# Patient Record
Sex: FEMALE | Race: WHITE | NOT HISPANIC OR LATINO | Employment: FULL TIME | URBAN - METROPOLITAN AREA
[De-identification: names, ages, dates, MRNs, and addresses within clinical notes are randomized per-mention and may not be internally consistent; named-entity substitution may affect disease eponyms.]

---

## 2017-01-27 ENCOUNTER — LAB CONVERSION - ENCOUNTER (OUTPATIENT)
Dept: OTHER | Facility: OTHER | Age: 62
End: 2017-01-27

## 2017-01-27 ENCOUNTER — GENERIC CONVERSION - ENCOUNTER (OUTPATIENT)
Dept: OTHER | Facility: OTHER | Age: 62
End: 2017-01-27

## 2017-01-27 LAB
CHOLEST SERPL-MCNC: 196 MG/DL (ref 125–200)
CHOLEST/HDLC SERPL: 3.2 (CALC)
HDLC SERPL-MCNC: 61 MG/DL
LDL CHOLESTEROL (HISTORICAL): 106 MG/DL (CALC)
NON-HDL-CHOL (CHOL-HDL) (HISTORICAL): 135 MG/DL (CALC)
TRIGL SERPL-MCNC: 146 MG/DL

## 2017-03-31 ENCOUNTER — ALLSCRIPTS OFFICE VISIT (OUTPATIENT)
Dept: OTHER | Facility: OTHER | Age: 62
End: 2017-03-31

## 2017-03-31 DIAGNOSIS — Z12.31 ENCOUNTER FOR SCREENING MAMMOGRAM FOR MALIGNANT NEOPLASM OF BREAST: ICD-10-CM

## 2017-03-31 DIAGNOSIS — E03.9 HYPOTHYROIDISM: ICD-10-CM

## 2017-05-01 ENCOUNTER — HOSPITAL ENCOUNTER (OUTPATIENT)
Dept: RADIOLOGY | Age: 62
Discharge: HOME | End: 2017-05-01
Payer: COMMERCIAL

## 2017-05-01 ENCOUNTER — ALLSCRIPTS OFFICE VISIT (OUTPATIENT)
Dept: OTHER | Facility: OTHER | Age: 62
End: 2017-05-01

## 2017-05-01 ENCOUNTER — TRANSCRIBE ORDERS (OUTPATIENT)
Dept: ADMINISTRATIVE | Age: 62
End: 2017-05-01

## 2017-05-01 DIAGNOSIS — M54.2 CERVICALGIA: ICD-10-CM

## 2017-05-01 DIAGNOSIS — Q21.1 ATRIAL SEPTAL DEFECT: ICD-10-CM

## 2017-05-01 DIAGNOSIS — Z12.12 ENCOUNTER FOR SCREENING FOR MALIGNANT NEOPLASM OF RECTUM: ICD-10-CM

## 2017-05-01 DIAGNOSIS — Z12.11 ENCOUNTER FOR SCREENING FOR MALIGNANT NEOPLASM OF COLON: ICD-10-CM

## 2017-05-01 DIAGNOSIS — M54.50 LOW BACK PAIN: ICD-10-CM

## 2017-05-01 DIAGNOSIS — Z91.81 HISTORY OF FALLING: ICD-10-CM

## 2017-05-01 PROCEDURE — 72100 X-RAY EXAM L-S SPINE 2/3 VWS: CPT

## 2017-05-01 PROCEDURE — 72040 X-RAY EXAM NECK SPINE 2-3 VW: CPT

## 2017-05-01 PROCEDURE — 72220 X-RAY EXAM SACRUM TAILBONE: CPT

## 2017-05-02 ENCOUNTER — GENERIC CONVERSION - ENCOUNTER (OUTPATIENT)
Dept: OTHER | Facility: OTHER | Age: 62
End: 2017-05-02

## 2018-01-12 VITALS
HEIGHT: 66 IN | DIASTOLIC BLOOD PRESSURE: 90 MMHG | RESPIRATION RATE: 16 BRPM | WEIGHT: 170 LBS | TEMPERATURE: 98.2 F | BODY MASS INDEX: 27.32 KG/M2 | SYSTOLIC BLOOD PRESSURE: 142 MMHG | HEART RATE: 70 BPM

## 2018-01-14 VITALS
RESPIRATION RATE: 12 BRPM | WEIGHT: 168.2 LBS | SYSTOLIC BLOOD PRESSURE: 136 MMHG | HEIGHT: 66 IN | BODY MASS INDEX: 27.03 KG/M2 | HEART RATE: 60 BPM | DIASTOLIC BLOOD PRESSURE: 82 MMHG | TEMPERATURE: 97.9 F

## 2018-01-15 NOTE — RESULT NOTES
Verified Results  * XR SPINE LUMBAR 2 OR 3 VIEWS INJURY [id number] 01:23PM Sachi Sylvester    Order Number: [id number]     Test Name Result Flag Reference   XR SPINE LUMBAR 2 OR 3 VIEWS (Report)     LUMBAR SPINE     INDICATION: Fall, low back pain     COMPARISON: None     VIEWS: AP, lateral and coned-down projections     IMAGES: 3     FINDINGS:     There is a moderate levoscoliosis of the lumbar spine  There is no radiographic evidence of acute fracture or destructive osseous lesion  There are there are mild multilevel degenerative changes with narrowing throughout the lumbar spine, particularly along the right aspect of the mid lumbar disc spaces at the apex of the scoliosis  There are associated spondylotic changes with multilevel   facet arthrosis  Visualized soft tissues appear unremarkable  IMPRESSION:     Moderate levoscoliosis with multilevel disc space narrowing and spondylosis  Workstation performed: [id number]     Signed by:   Parviz Link MD   5/2/17     * XR SACRUM AND COCCYX [id number] 01:23PM Deal Leventhal Order Number: [id number]     Test Name Result Flag Reference   XR SACRUM AND COCCYX (Report)     SACRUM AND COCCYX     INDICATION: Fall, low back pain     COMPARISON: None     VIEWS: 3     IMAGES: 3      FINDINGS:     There is no acute evidence of fracture  Sacral arcuate lines are maintained  The SI joints appear symmetric  Pubic symphysis maintained  IMPRESSION:     No fracture         Workstation performed: [id number]     Signed by:   Parviz Link MD   5/2/17     * XR SPINE CERVICAL 2 OR 3 VW INJURY [id number] 01:23PM Deal Leventhal Order Number: [id number]     Test Name Result Flag Reference   XR SPINE CERVICAL 2 OR 3 VW (Report)     CERVICAL SPINE     INDICATION: Fall, posterior neck pain     COMPARISON: None     VIEWS: AP, lateral and open mouth projections     IMAGES: 3     FINDINGS:     No evidence of fracture or subluxation  There is loss of disc height from C5 through C7 with associated spondylosis  The prevertebral soft tissues are within normal limits  The lung apices are intact  IMPRESSION:     Discogenic and spondylotic degenerative change from C5 through C7         Workstation performed: [id number]     Signed by:   Ally Dexter MD   5/2/17

## 2018-01-17 NOTE — RESULT NOTES
Message   Call patient  I received blood work report  Vitamin D level is low at 16 ( normal >  32)  TSH level is  borderline high  Patient should  to come for her annual follow-up visit, will review blood work in details  Verified Results  (Q) CBC (INCLUDES DIFF/PLT) (REFL) [id number] 10:39AM Bri Mead     Test Name Result Flag Reference   WHITE BLOOD CELL COUNT 3 8 Thousand/uL  3 8-10 8   RED BLOOD CELL COUNT 4 47 Million/uL  3 80-5 10   HEMOGLOBIN 13 1 g/dL  11 7-15 5   HEMATOCRIT 39 7 %  35 0-45 0   MCV 88 9 fL  80 0-100 0   MCH 29 2 pg  27 0-33 0   MCHC 32 9 g/dL  32 0-36 0   RDW 13 7 %  11 0-15 0   PLATELET COUNT 411 Thousand/uL  140-400   MPV 8 7 fL  7 5-12 5   ABSOLUTE NEUTROPHILS 2037 cells/uL  6526-4435   ABSOLUTE LYMPHOCYTES 1482 cells/uL  850-3900   ABSOLUTE MONOCYTES 209 cells/uL  200-950   ABSOLUTE EOSINOPHILS 57 cells/uL     ABSOLUTE BASOPHILS 15 cells/uL  0-200   NEUTROPHILS 53 6 %     LYMPHOCYTES 39 0 %     MONOCYTES 5 5 %     EOSINOPHILS 1 5 %     BASOPHILS 0 4 %       (Q) COMPREHENSIVE METABOLIC PNL W/ADJUSTED CALCIUM 26Jan2017 10:39AM Bri [employer]     Test Name Result Flag Reference   GLUCOSE 95 mg/dL  65-99   Fasting reference interval   UREA NITROGEN (BUN) 21 mg/dL  7-25   CREATININE 0 98 mg/dL  0 50-0 99   For patients >52years of age, the reference limit  for Creatinine is approximately 13% higher for people  identified as -American  eGFR NON-AFR   AMERICAN 62 mL/min/1 73m2  > OR = 60   eGFR AFRICAN AMERICAN 72 mL/min/1 73m2  > OR = 60   BUN/CREATININE RATIO   6-32   NOT APPLICABLE (calc)   SODIUM 140 mmol/L  135-146   POTASSIUM 4 3 mmol/L  3 5-5 3   CHLORIDE 104 mmol/L     CARBON DIOXIDE 28 mmol/L  20-31   CALCIUM 9 1 mg/dL  8 6-10 4   CALCIUM (ADJUSTED FOR$ALBUMIN) 9 1 mg/dL (calc)  8 6-10 2   PROTEIN, TOTAL 6 9 g/dL  6 1-8 1   ALBUMIN 4 4 g/dL  3 6-5 1   GLOBULIN 2 5 g/dL (calc)  1 9-3 7   ALBUMIN/GLOBULIN RATIO 1 8 (calc)  1 0-2 5 BILIRUBIN, TOTAL 0 7 mg/dL  0 2-1 2   ALKALINE PHOSPHATASE 63 U/L     AST 14 U/L  10-35   ALT 15 U/L  6-29     (Q) TSH, 3RD GENERATION 26Jan2017 10:39AM Maykel Pavon     Test Name Result Flag Reference   TSH 5 65 mIU/L H 0 40-4 50     *(Q) VITAMIN D, 25-HYDROXY, LC/MS/MS 26Jan2017 10:39AM Giselle Torrez Limb   REPORT COMMENT:  REQ ON HOLD  FASTING:YES AN UPDATE OR CORRECTION HAS BEEN MADE TO NAME     Test Name Result Flag Reference   VITAMIN D, 25-OH, TOTAL 16 ng/mL L    Vitamin D Status         25-OH Vitamin D:     Deficiency:                    <20 ng/mL  Insufficiency:             20 - 29 ng/mL  Optimal:                 > or = 30 ng/mL     For 25-OH Vitamin D testing on patients on   D2-supplementation and patients for whom quantitation   of D2 and D3 fractions is required, the QuestAssureD(TM)  25-OH VIT D, (D2,D3), LC/MS/MS is recommended: order   code 66807 (patients >2yrs)  For more information on this test, go to:  http://education  Sendoid/faq/HTS843  (This link is being provided for   informational/educational purposes only )     (1) LIPID PANEL, FASTING 26Jan2017 10:39AM Maykel Pavon     Test Name Result Flag Reference   CHOLESTEROL, TOTAL 196 mg/dL  125-200   HDL CHOLESTEROL 61 mg/dL  > OR = 46   TRIGLICERIDES 951 mg/dL  <150   LDL-CHOLESTEROL 106 mg/dL (calc)  <130   Desirable range <100 mg/dL for patients with CHD or  diabetes and <70 mg/dL for diabetic patients with  known heart disease  CHOL/HDLC RATIO 3 2 (calc)  < OR = 5 0   NON HDL CHOLESTEROL 135 mg/dL (calc)     Target for non-HDL cholesterol is 30 mg/dL higher than   LDL cholesterol target  See Below     We received your handwritten test order and  performed the AMA defined lipid panel  If  this is not what you intended to order, please  contact your local   immediately so that we may adjust our billing  appropriately   You may also inquire about  alternative or additional testing

## 2018-03-03 LAB
25(OH)D3 SERPL-MCNC: 22 NG/ML (ref 30–100)
ALBUMIN SERPL-MCNC: 4.5 G/DL (ref 3.6–5.1)
ALBUMIN/GLOB SERPL: 1.8 (CALC) (ref 1–2.5)
ALP SERPL-CCNC: 68 U/L (ref 33–130)
ALT SERPL-CCNC: 14 U/L (ref 6–29)
AST SERPL-CCNC: 15 U/L (ref 10–35)
BASOPHILS # BLD AUTO: 39 CELLS/UL (ref 0–200)
BASOPHILS NFR BLD AUTO: 0.9 %
BILIRUB SERPL-MCNC: 0.9 MG/DL (ref 0.2–1.2)
BUN SERPL-MCNC: 15 MG/DL (ref 7–25)
BUN/CREAT SERPL: NORMAL (CALC) (ref 6–22)
CALCIUM SERPL-MCNC: 9.2 MG/DL (ref 8.6–10.4)
CHLORIDE SERPL-SCNC: 103 MMOL/L (ref 98–110)
CHOLEST SERPL-MCNC: 191 MG/DL
CHOLEST/HDLC SERPL: 2.5 (CALC)
CO2 SERPL-SCNC: 30 MMOL/L (ref 20–31)
CREAT SERPL-MCNC: 0.9 MG/DL (ref 0.5–0.99)
EOSINOPHIL # BLD AUTO: 30 CELLS/UL (ref 15–500)
EOSINOPHIL NFR BLD AUTO: 0.7 %
ERYTHROCYTE [DISTWIDTH] IN BLOOD BY AUTOMATED COUNT: 12.9 % (ref 11–15)
GLOBULIN SER CALC-MCNC: 2.5 G/DL (CALC) (ref 1.9–3.7)
GLUCOSE SERPL-MCNC: 93 MG/DL (ref 65–99)
HCT VFR BLD AUTO: 42 % (ref 35–45)
HDLC SERPL-MCNC: 77 MG/DL
HGB BLD-MCNC: 14.4 G/DL (ref 11.7–15.5)
LDLC SERPL CALC-MCNC: 91 MG/DL (CALC)
LYMPHOCYTES # BLD AUTO: 1638 CELLS/UL (ref 850–3900)
LYMPHOCYTES NFR BLD AUTO: 38.1 %
MCH RBC QN AUTO: 30.6 PG (ref 27–33)
MCHC RBC AUTO-ENTMCNC: 34.3 G/DL (ref 32–36)
MCV RBC AUTO: 89.2 FL (ref 80–100)
MONOCYTES # BLD AUTO: 310 CELLS/UL (ref 200–950)
MONOCYTES NFR BLD AUTO: 7.2 %
NEUTROPHILS # BLD AUTO: 2283 CELLS/UL (ref 1500–7800)
NEUTROPHILS NFR BLD AUTO: 53.1 %
NONHDLC SERPL-MCNC: 114 MG/DL (CALC)
PLATELET # BLD AUTO: 218 THOUSAND/UL (ref 140–400)
PMV BLD REES-ECKER: 10 FL (ref 7.5–12.5)
POTASSIUM SERPL-SCNC: 4.3 MMOL/L (ref 3.5–5.3)
PROT SERPL-MCNC: 7 G/DL (ref 6.1–8.1)
RBC # BLD AUTO: 4.71 MILLION/UL (ref 3.8–5.1)
SL AMB EGFR AFRICAN AMERICAN: 79 ML/MIN/1.73M2
SL AMB EGFR NON AFRICAN AMERICAN: 68 ML/MIN/1.73M2
SODIUM SERPL-SCNC: 140 MMOL/L (ref 135–146)
T4 FREE SERPL-MCNC: 1.1 NG/DL (ref 0.8–1.8)
TRIGL SERPL-MCNC: 125 MG/DL
WBC # BLD AUTO: 4.3 THOUSAND/UL (ref 3.8–10.8)

## 2018-03-08 RX ORDER — IBUPROFEN 200 MG
TABLET ORAL
COMMUNITY
Start: 2017-05-01 | End: 2018-04-19 | Stop reason: ALTCHOICE

## 2018-03-08 RX ORDER — CYCLOBENZAPRINE HCL 5 MG
TABLET ORAL
COMMUNITY
Start: 2017-05-01 | End: 2018-04-19 | Stop reason: ALTCHOICE

## 2018-03-08 RX ORDER — ERGOCALCIFEROL 1.25 MG/1
1 CAPSULE ORAL WEEKLY
COMMUNITY
Start: 2017-03-31 | End: 2018-12-13 | Stop reason: ALTCHOICE

## 2018-03-12 ENCOUNTER — OFFICE VISIT (OUTPATIENT)
Dept: FAMILY MEDICINE CLINIC | Facility: CLINIC | Age: 63
End: 2018-03-12
Payer: COMMERCIAL

## 2018-03-12 VITALS
SYSTOLIC BLOOD PRESSURE: 134 MMHG | TEMPERATURE: 97.8 F | HEIGHT: 67 IN | HEART RATE: 80 BPM | OXYGEN SATURATION: 98 % | DIASTOLIC BLOOD PRESSURE: 86 MMHG | BODY MASS INDEX: 27.15 KG/M2 | WEIGHT: 173 LBS | RESPIRATION RATE: 12 BRPM

## 2018-03-12 DIAGNOSIS — E55.9 VITAMIN D DEFICIENCY: ICD-10-CM

## 2018-03-12 DIAGNOSIS — E66.3 OVERWEIGHT (BMI 25.0-29.9): ICD-10-CM

## 2018-03-12 DIAGNOSIS — Z00.00 ROUTINE PHYSICAL EXAMINATION: Primary | ICD-10-CM

## 2018-03-12 DIAGNOSIS — Z23 NEED FOR TUBERCULOSIS VACCINATION: ICD-10-CM

## 2018-03-12 PROCEDURE — 99396 PREV VISIT EST AGE 40-64: CPT | Performed by: NURSE PRACTITIONER

## 2018-03-12 NOTE — PATIENT INSTRUCTIONS
Follow up with Dr Jesu Zuniga 4/2018  Needs to schedule GYN exam/ PAP  Has mammogram order as well   Recommended to take OTC Vitamin D 5000 IU and Calcium 1200mg daily   Exercise 30 minutes 5 x per week

## 2018-03-12 NOTE — PROGRESS NOTES
Chief Complaint   Patient presents with    Physical Exam     Work Physical     Assessment/Plan:    No problem-specific Assessment & Plan notes found for this encounter  Diagnoses and all orders for this visit:    Routine physical examination  Comments:  Pt forgot her forms today- she'll drop them off tomorrow  Needs a PPD test which she'll also schedule for tomorrow    Vitamin D deficiency  Comments:  Recommended OTC Vitamin D 5000IU one tab daily    Overweight (BMI 25 0-29  9)  Comments:  Encouraged to exercise 30 minutes 5 x per week, work on weight loss       Pt has a routine follow up with Dr Lashay Harp 4/2018  Needs to schedule GYN exam/ PAP  Has mammogram order as well     Subjective:      Patient ID: Kevin Abreu is a 60 y o  female  Assessment/Plan    Healthy female exam       1  Patient Counseling:  --Nutrition: Stressed importance of moderation in sodium/caffeine intake, saturated fat and cholesterol, caloric balance, sufficient intake of fresh fruits, vegetables, fiber, calcium, iron, and 1 mg of folate supplement per day (for females capable of pregnancy)  --Discussed the issue of estrogen replacement, calcium supplement, and the daily use of baby aspirin  --Exercise: Stressed the importance of regular exercise  --Substance Abuse: Discussed cessation/primary prevention of tobacco, alcohol, or other drug use; driving or other dangerous activities under the influence; availability of treatment for abuse  --Sexuality: Discussed sexually transmitted diseases, partner selection, use of condoms, avoidance of unintended pregnancy  and contraceptive alternatives  --Injury prevention: Discussed safety belts, safety helmets, smoke detector, smoking near bedding or upholstery  --Dental health: Discussed importance of regular tooth brushing, flossing, and dental visits  --Immunizations reviewed  --Discussed benefits of screening colonoscopy    --After hours service discussed with patient    2  Discussed the patient's BMI with her  The BMI is above average; BMI management plan is completed    3  HM- UTD with colonoscopy (had 3/2016 with Dr Lorena White, due for repeat 10 years), has mammogram ordered  Advised to schedule a routine GYN exam/ PAP- she is aware that our office performs these exams  Recent lab results reviewed today  Recommended OTC Calcium 1200mg and Vitamin D 5000 IU daily  4  Follow up as scheduled with Dr Audra Chahal 4/2018    Vi Fong is a 60 y o  female and is here for a comprehensive physical exam  The patient reports no problems  She presents today for a work physical exam     No specific diets that she is following  Just started walking- walks at the [facility] 3-4 times per week (started 2 weeks ago)    Do you take any herbs or supplements that were not prescribed by a doctor? yes  Are you taking calcium supplements? no  Are you taking aspirin daily?  Yes    Mammogram- last had done in 2015, she does have a script to have this done (ordered by myself 3/2017)  GYN exam/ PAP- has not had this done in several years and does not have a GYN provider  Colonoscopy- had 3/2016 with Dr Lorena White, due for repeat 2026    Recent labs:  Vitamin D at 22  Fasting glucose at 93  BUN 15, Creat 0 90  H/H 14 4/ 42  / LDL 91/ / HDL 77     History:  Last pap date:several years ago   Abnormal pap? no      The following portions of the patient's history were reviewed and updated as appropriate: allergies, current medications, past medical history, past social history and problem list     Review of Systems  Do you have pain that bothers you in your daily life? no  Constitutional: negative  Eyes: negative  Ears, nose, mouth, throat, and face: negative  Respiratory: negative  Cardiovascular: negative  Gastrointestinal: negative  Integument/breast: negative  Hematologic/lymphatic: negative  Musculoskeletal:negative  Neurological: negative  Behavioral/Psych: negative  Objective    /86 (BP Location: Left arm, Patient Position: Sitting, Cuff Size: Adult)   Pulse 80   Temp 97 8 °F (36 6 °C) (Tympanic)   Resp 12   Ht 5' 6 5" (1 689 m)   Wt 78 5 kg (173 lb)   SpO2 98%   BMI 27 50 kg/m²   General appearance: alert and oriented, in no acute distress  Head: Normocephalic, without obvious abnormality, atraumatic  Eyes: conjunctivae/corneas clear  PERRL, EOM's intact  Fundi benign  Wearing eyeglasses   Ears: B/L ears with approximately 75% impacted cerumen (declined ear lavage today)   Nose: Nares normal  Septum midline  Mucosa normal  No drainage or sinus tenderness  Throat: lips, mucosa, and tongue normal; teeth and gums normal  Neck: no adenopathy, no carotid bruit, no JVD, supple, symmetrical, trachea midline and thyroid not enlarged, symmetric, no tenderness/mass/nodules  Back: symmetric, no curvature  ROM normal  No CVA tenderness    Lungs: clear to auscultation bilaterally  Heart: regular rate and rhythm, S1, S2 normal, no murmur, click, rub or gallop  Abdomen: soft, non-tender; bowel sounds normal; no masses,  no organomegaly  Extremities: extremities normal, warm and well-perfused; no cyanosis, clubbing, or edema  Skin: Skin color, texture, turgor normal  No rashes or lesions  Lymph nodes: Cervical, supraclavicular, and axillary nodes normal   Neurologic: Grossly normal               The following portions of the patient's history were reviewed and updated as appropriate: allergies, current medications, past medical history, past social history and problem list   Review of Systems  see above      Objective:      /86 (BP Location: Left arm, Patient Position: Sitting, Cuff Size: Adult)   Pulse 80   Temp 97 8 °F (36 6 °C) (Tympanic)   Resp 12   Ht 5' 6 5" (1 689 m)   Wt 78 5 kg (173 lb)   SpO2 98%   BMI 27 50 kg/m²          Physical Exam  see above

## 2018-03-13 PROCEDURE — 86580 TB INTRADERMAL TEST: CPT

## 2018-03-15 LAB
INDURATION: 0 MM
TB SKIN TEST: NEGATIVE

## 2018-03-28 DIAGNOSIS — F41.8 DEPRESSION WITH ANXIETY: Primary | ICD-10-CM

## 2018-03-28 RX ORDER — CITALOPRAM 40 MG/1
TABLET ORAL
Qty: 30 TABLET | Refills: 5 | Status: SHIPPED | OUTPATIENT
Start: 2018-03-28 | End: 2018-10-16 | Stop reason: SDUPTHER

## 2018-04-19 ENCOUNTER — OFFICE VISIT (OUTPATIENT)
Dept: FAMILY MEDICINE CLINIC | Facility: CLINIC | Age: 63
End: 2018-04-19
Payer: COMMERCIAL

## 2018-04-19 VITALS
TEMPERATURE: 98.8 F | HEIGHT: 67 IN | OXYGEN SATURATION: 97 % | HEART RATE: 66 BPM | SYSTOLIC BLOOD PRESSURE: 124 MMHG | RESPIRATION RATE: 16 BRPM | DIASTOLIC BLOOD PRESSURE: 84 MMHG | BODY MASS INDEX: 26.93 KG/M2 | WEIGHT: 171.6 LBS

## 2018-04-19 DIAGNOSIS — Z12.39 ENCOUNTER FOR SCREENING FOR MALIGNANT NEOPLASM OF BREAST: ICD-10-CM

## 2018-04-19 DIAGNOSIS — I10 ESSENTIAL HYPERTENSION: Primary | ICD-10-CM

## 2018-04-19 DIAGNOSIS — F41.8 DEPRESSION WITH ANXIETY: ICD-10-CM

## 2018-04-19 DIAGNOSIS — E55.9 VITAMIN D DEFICIENCY: ICD-10-CM

## 2018-04-19 DIAGNOSIS — E03.9 ACQUIRED HYPOTHYROIDISM: ICD-10-CM

## 2018-04-19 DIAGNOSIS — E78.2 MIXED HYPERLIPIDEMIA: ICD-10-CM

## 2018-04-19 DIAGNOSIS — E78.5 DYSLIPIDEMIA: ICD-10-CM

## 2018-04-19 PROCEDURE — 3074F SYST BP LT 130 MM HG: CPT | Performed by: FAMILY MEDICINE

## 2018-04-19 PROCEDURE — 3079F DIAST BP 80-89 MM HG: CPT | Performed by: FAMILY MEDICINE

## 2018-04-19 PROCEDURE — 3008F BODY MASS INDEX DOCD: CPT | Performed by: FAMILY MEDICINE

## 2018-04-19 PROCEDURE — 99214 OFFICE O/P EST MOD 30 MIN: CPT | Performed by: FAMILY MEDICINE

## 2018-04-19 PROCEDURE — 1036F TOBACCO NON-USER: CPT | Performed by: FAMILY MEDICINE

## 2018-04-19 RX ORDER — METOPROLOL SUCCINATE 25 MG/1
25 TABLET, EXTENDED RELEASE ORAL DAILY
COMMUNITY
End: 2018-05-29 | Stop reason: SDUPTHER

## 2018-04-19 RX ORDER — MAG HYDROX/ALUMINUM HYD/SIMETH 400-400-40
SUSPENSION, ORAL (FINAL DOSE FORM) ORAL
Qty: 30 CAPSULE | Refills: 5 | Status: SHIPPED | OUTPATIENT
Start: 2018-04-19 | End: 2020-01-20 | Stop reason: ALTCHOICE

## 2018-04-19 NOTE — PROGRESS NOTES
Assessment/Plan:    HTN (hypertension)  BP is stable  Continue Metoprolol  Follow a low-sodium diet, regular exercise  Hypothyroidism  Continue replacement with Levothyroxine 50 mcg daily  Mixed hyperlipidemia  Hyperlipidemia -controlled on  Atorvastatin 40 mg daily  Follow a low-cholesterol, low-fat diet, regular exercise  Depression with anxiety  Symptoms are stable  Cont  Citalopram 40 mg daily, Lamictal 100 mg daily, Clonazepam 2 mg before bedtime         Vitamin D deficiency  Start Vit D 5000 IU daily  HM : recommended toschedule screening mammogram, pelvic exam and Pap smear  Discussed Zostavax vaccination  Patient will check with medical insurance regarding coverage for Zostavax  Schedule follow-up visit in 6 months  Check labs prior to next visit  Diagnoses and all orders for this visit:    Essential hypertension  -     Comprehensive metabolic panel; Future    Acquired hypothyroidism  -     TSH, 3rd generation with T4 reflex; Future    Dyslipidemia  -     Lipid panel; Future    Depression with anxiety    Vitamin D deficiency  -     Cholecalciferol (VITAMIN D3) 5000 units CAPS; Take 1 caps  daily  -     Vitamin D 25 hydroxy; Future    Encounter for screening for malignant neoplasm of breast  -     Mammo screening bilateral w cad; Future    Mixed hyperlipidemia    Other orders  -     metoprolol succinate (TOPROL-XL) 25 mg 24 hr tablet; Take 25 mg by mouth daily          Subjective:      Patient ID: Birgit Delgado is a 60 y o  female  HPI     Patient is 58-year-old female with HTN,  Hypothyroidism, Hyperlipidemia, Anxiety disorder, Depression, Vit D deficiency  She presents for a routine follow-up office visit  Reviewed all current medications, updated med list     Blood work done on 3/2/18  Hb 14 4, fasting blood sugar 93, Vit D 25 hydroxy 22, creatinine 0 90, cholesterol 191, HDL 77, LDL 91, triglycerides 125  HTN - blood pressure is stable      Patient takes Metoprolol ER 25 mg daily  Denies chest pain, shortness of breath, dizziness  Hypothyroidism - currently on Levothyroxine 50 mcg daily  Denies fatigue, hair loss, constipation  Hyperlipidemia - on Atorvastatin 40 mg daily  Denies side effects  Depression / Anxiety - symptoms are stable  Patient takes Citalopram, Lamictal, Klonopin  C/o feeling stressed out due to her daughter"s  health issues  Denies anxiety attacks  No suicidal ideation  Denies tobacco use  Vit D deficiency - patient states that she stopped taking Vit D supplement 2 months ago  Patient has history of PFO, TIA , L internal carotid artery dissection a few years ago  She was on anticoagulation with Coumadin, which was d/c   She takes ASA 81 mg 2-3  times per week  Last mammogram  Done in 12/15  Patient had colonoscopy in March 2016     Colorectal surgeon Dr Dr Sandra Ferrara recommended to repeat colonoscopy in 10 years  Pneumovax done in 3/16, Flu shot in 10/17  The following portions of the patient's history were reviewed and updated as appropriate: allergies, current medications, past family history, past medical history, past social history, past surgical history and problem list     Review of Systems   Constitutional: Negative for activity change, appetite change, chills, fatigue and fever  HENT: Negative  Eyes: Negative for pain, discharge, redness, itching and visual disturbance  Respiratory: Negative for cough, chest tightness and shortness of breath  Cardiovascular: Negative for chest pain, palpitations and leg swelling  Gastrointestinal: Negative for abdominal pain, blood in stool, constipation, diarrhea, nausea and vomiting  Genitourinary: Negative for difficulty urinating, dysuria, frequency and hematuria  Musculoskeletal: Negative for arthralgias, gait problem, joint swelling and myalgias  Skin: Negative for rash and wound     Neurological: Negative for dizziness, seizures, syncope and headaches  Hematological: Negative  Psychiatric/Behavioral: Negative for sleep disturbance and suicidal ideas  Anxiety / Depression - symptoms are stable  Objective:      /84 (BP Location: Left arm, Patient Position: Sitting, Cuff Size: Adult)   Pulse 66   Temp 98 8 °F (37 1 °C) (Tympanic)   Resp 16   Ht 5' 6 5" (1 689 m)   Wt 77 8 kg (171 lb 9 6 oz)   SpO2 97%   BMI 27 28 kg/m²          Physical Exam   Constitutional: She is oriented to person, place, and time  She appears well-developed and well-nourished  HENT:   Head: Normocephalic and atraumatic  Nose: Nose normal    Mouth/Throat: Oropharynx is clear and moist    Small amount of wax in both external ear canals  Eyes: Conjunctivae are normal  Pupils are equal, round, and reactive to light  Cardiovascular: Normal rate, regular rhythm and normal heart sounds  No murmur heard  No carotid bruits BL  No BL LE edema   Pulmonary/Chest: Effort normal and breath sounds normal  She has no wheezes  She has no rales  Abdominal: Soft  Bowel sounds are normal  There is no tenderness  Musculoskeletal: Normal range of motion  She exhibits no edema, tenderness or deformity  Neurological: She is alert and oriented to person, place, and time  Coordination normal    Skin: Skin is warm and dry  No rash noted  Psychiatric: She has a normal mood and affect  Her behavior is normal    Nursing note and vitals reviewed

## 2018-04-20 NOTE — ASSESSMENT & PLAN NOTE
Symptoms are stable  Cont   Citalopram 40 mg daily, Lamictal 100 mg daily, Clonazepam 2 mg before bedtime

## 2018-04-20 NOTE — ASSESSMENT & PLAN NOTE
Hyperlipidemia -controlled on  Atorvastatin 40 mg daily  Follow a low-cholesterol, low-fat diet, regular exercise

## 2018-05-29 DIAGNOSIS — I10 ESSENTIAL HYPERTENSION: Primary | ICD-10-CM

## 2018-05-29 RX ORDER — METOPROLOL SUCCINATE 25 MG/1
TABLET, EXTENDED RELEASE ORAL
Qty: 30 TABLET | Refills: 6 | Status: SHIPPED | OUTPATIENT
Start: 2018-05-29 | End: 2018-12-10 | Stop reason: SDUPTHER

## 2018-06-03 DIAGNOSIS — E78.2 MIXED HYPERLIPIDEMIA: Primary | ICD-10-CM

## 2018-06-03 DIAGNOSIS — E03.9 ACQUIRED HYPOTHYROIDISM: ICD-10-CM

## 2018-06-03 RX ORDER — LEVOTHYROXINE SODIUM 0.05 MG/1
TABLET ORAL
Qty: 30 TABLET | Refills: 6 | Status: SHIPPED | OUTPATIENT
Start: 2018-06-03 | End: 2018-12-10 | Stop reason: SDUPTHER

## 2018-06-03 RX ORDER — ATORVASTATIN CALCIUM 40 MG/1
TABLET, FILM COATED ORAL
Qty: 30 TABLET | Refills: 6 | Status: SHIPPED | OUTPATIENT
Start: 2018-06-03 | End: 2018-12-20 | Stop reason: SDUPTHER

## 2018-06-18 DIAGNOSIS — F41.9 ANXIETY DISORDER, UNSPECIFIED TYPE: Primary | ICD-10-CM

## 2018-06-18 RX ORDER — CLONAZEPAM 2 MG/1
2 TABLET ORAL
Qty: 30 TABLET | Refills: 5 | Status: SHIPPED | OUTPATIENT
Start: 2018-06-18 | End: 2018-12-10 | Stop reason: SDUPTHER

## 2018-07-09 RX ORDER — LAMOTRIGINE 100 MG/1
TABLET ORAL
Qty: 60 TABLET | OUTPATIENT
Start: 2018-07-09

## 2018-07-09 NOTE — TELEPHONE ENCOUNTER
Called patient, she said she takes 2 daily, and that she actually just picked her prescriptions up at [facility] and they filled her 30 day supply for Lamictal

## 2018-07-18 ENCOUNTER — HOSPITAL ENCOUNTER (OUTPATIENT)
Dept: RADIOLOGY | Age: 63
Discharge: HOME | End: 2018-07-18
Payer: COMMERCIAL

## 2018-07-18 DIAGNOSIS — Z12.39 ENCOUNTER FOR SCREENING FOR MALIGNANT NEOPLASM OF BREAST: ICD-10-CM

## 2018-07-18 PROCEDURE — 77067 SCR MAMMO BI INCL CAD: CPT

## 2018-07-19 DIAGNOSIS — R92.2 BREAST DENSITY: Primary | ICD-10-CM

## 2018-07-26 ENCOUNTER — TELEPHONE (OUTPATIENT)
Dept: MAMMOGRAPHY | Facility: CLINIC | Age: 63
End: 2018-07-26

## 2018-07-26 NOTE — TELEPHONE ENCOUNTER
When I spoke and gave results to her, she was fine with following up and the orders are probably just about arriving at her house

## 2018-08-07 ENCOUNTER — HOSPITAL ENCOUNTER (OUTPATIENT)
Dept: ULTRASOUND IMAGING | Facility: CLINIC | Age: 63
Discharge: HOME | End: 2018-08-07
Payer: COMMERCIAL

## 2018-08-07 ENCOUNTER — HOSPITAL ENCOUNTER (OUTPATIENT)
Dept: MAMMOGRAPHY | Facility: CLINIC | Age: 63
Discharge: HOME | End: 2018-08-07
Payer: COMMERCIAL

## 2018-08-07 DIAGNOSIS — R92.2 BREAST DENSITY: ICD-10-CM

## 2018-08-07 PROCEDURE — 77065 DX MAMMO INCL CAD UNI: CPT

## 2018-08-07 PROCEDURE — 76642 ULTRASOUND BREAST LIMITED: CPT

## 2018-08-07 PROCEDURE — G0279 TOMOSYNTHESIS, MAMMO: HCPCS

## 2018-08-12 DIAGNOSIS — F41.8 DEPRESSION WITH ANXIETY: Primary | ICD-10-CM

## 2018-08-12 RX ORDER — LAMOTRIGINE 100 MG/1
TABLET ORAL
Qty: 60 TABLET | Refills: 5 | Status: SHIPPED | OUTPATIENT
Start: 2018-08-12 | End: 2018-12-20 | Stop reason: SDUPTHER

## 2018-10-16 DIAGNOSIS — F41.8 DEPRESSION WITH ANXIETY: ICD-10-CM

## 2018-10-16 RX ORDER — CITALOPRAM 40 MG/1
40 TABLET ORAL DAILY
Qty: 30 TABLET | Refills: 5 | Status: SHIPPED | OUTPATIENT
Start: 2018-10-16 | End: 2018-12-20 | Stop reason: SDUPTHER

## 2018-11-28 DIAGNOSIS — E78.5 DYSLIPIDEMIA: ICD-10-CM

## 2018-11-28 DIAGNOSIS — E03.9 ACQUIRED HYPOTHYROIDISM: ICD-10-CM

## 2018-11-28 DIAGNOSIS — E55.9 VITAMIN D DEFICIENCY: ICD-10-CM

## 2018-11-28 DIAGNOSIS — I10 HYPERTENSION, ESSENTIAL: Primary | ICD-10-CM

## 2018-12-07 ENCOUNTER — TELEPHONE (OUTPATIENT)
Dept: FAMILY MEDICINE CLINIC | Facility: CLINIC | Age: 63
End: 2018-12-07

## 2018-12-10 DIAGNOSIS — F41.9 ANXIETY DISORDER, UNSPECIFIED TYPE: ICD-10-CM

## 2018-12-10 DIAGNOSIS — E03.9 ACQUIRED HYPOTHYROIDISM: ICD-10-CM

## 2018-12-10 DIAGNOSIS — I10 ESSENTIAL HYPERTENSION: ICD-10-CM

## 2018-12-10 NOTE — TELEPHONE ENCOUNTER
Requesting      1)  Clonazepam  2mg tablets, Qty 30,                              Refills 5,                               take 1 tablet by mouth daily at bedtime                                        2)  Metoprolol succinate 25mg 24 hr tablet,                       Qty 30, Refills 6, take 1 tablet by mouth daily                                                         3)   Levothyroxine   50mcg tablets  Qty 30                            Refills 6,  Take  One tablet once daily every                                          Morning       Send to [facility]

## 2018-12-11 RX ORDER — CLONAZEPAM 2 MG/1
2 TABLET ORAL
Qty: 30 TABLET | Refills: 5 | Status: SHIPPED | OUTPATIENT
Start: 2018-12-11 | End: 2019-06-12 | Stop reason: SDUPTHER

## 2018-12-11 RX ORDER — METOPROLOL SUCCINATE 25 MG/1
25 TABLET, EXTENDED RELEASE ORAL DAILY
Qty: 30 TABLET | Refills: 5 | Status: SHIPPED | OUTPATIENT
Start: 2018-12-11 | End: 2019-02-04 | Stop reason: SDUPTHER

## 2018-12-11 RX ORDER — LEVOTHYROXINE SODIUM 0.05 MG/1
50 TABLET ORAL DAILY
Qty: 30 TABLET | Refills: 5 | Status: SHIPPED | OUTPATIENT
Start: 2018-12-11 | End: 2019-06-12 | Stop reason: SDUPTHER

## 2018-12-12 LAB
25(OH)D3 SERPL-MCNC: 25 NG/ML (ref 30–100)
ALBUMIN SERPL-MCNC: 4.5 G/DL (ref 3.6–5.1)
ALBUMIN/GLOB SERPL: 1.6 (CALC) (ref 1–2.5)
ALP SERPL-CCNC: 71 U/L (ref 33–130)
ALT SERPL-CCNC: 12 U/L (ref 6–29)
AST SERPL-CCNC: 15 U/L (ref 10–35)
BILIRUB SERPL-MCNC: 0.8 MG/DL (ref 0.2–1.2)
BUN SERPL-MCNC: 18 MG/DL (ref 7–25)
BUN/CREAT SERPL: NORMAL (CALC) (ref 6–22)
CALCIUM SERPL-MCNC: 8.8 MG/DL (ref 8.6–10.4)
CHLORIDE SERPL-SCNC: 103 MMOL/L (ref 98–110)
CHOLEST SERPL-MCNC: 191 MG/DL
CHOLEST/HDLC SERPL: 2.7 (CALC)
CO2 SERPL-SCNC: 31 MMOL/L (ref 20–32)
CREAT SERPL-MCNC: 0.94 MG/DL (ref 0.5–0.99)
GLOBULIN SER CALC-MCNC: 2.8 G/DL (CALC) (ref 1.9–3.7)
GLUCOSE SERPL-MCNC: 92 MG/DL (ref 65–99)
HDLC SERPL-MCNC: 72 MG/DL
LDLC SERPL CALC-MCNC: 95 MG/DL (CALC)
NONHDLC SERPL-MCNC: 119 MG/DL (CALC)
POTASSIUM SERPL-SCNC: 4 MMOL/L (ref 3.5–5.3)
PROT SERPL-MCNC: 7.3 G/DL (ref 6.1–8.1)
SL AMB EGFR AFRICAN AMERICAN: 74 ML/MIN/1.73M2
SL AMB EGFR NON AFRICAN AMERICAN: 64 ML/MIN/1.73M2
SODIUM SERPL-SCNC: 141 MMOL/L (ref 135–146)
T4 FREE SERPL-MCNC: 0.9 NG/DL (ref 0.8–1.8)
TRIGL SERPL-MCNC: 140 MG/DL
TSH SERPL-ACNC: 16.26 MIU/L (ref 0.4–4.5)

## 2018-12-13 ENCOUNTER — OFFICE VISIT (OUTPATIENT)
Dept: FAMILY MEDICINE CLINIC | Facility: CLINIC | Age: 63
End: 2018-12-13
Payer: COMMERCIAL

## 2018-12-13 VITALS
RESPIRATION RATE: 16 BRPM | SYSTOLIC BLOOD PRESSURE: 156 MMHG | BODY MASS INDEX: 26.77 KG/M2 | DIASTOLIC BLOOD PRESSURE: 100 MMHG | WEIGHT: 168.4 LBS | HEART RATE: 72 BPM | OXYGEN SATURATION: 97 % | TEMPERATURE: 98.7 F

## 2018-12-13 DIAGNOSIS — I10 ESSENTIAL HYPERTENSION: Primary | ICD-10-CM

## 2018-12-13 DIAGNOSIS — N64.89 BREAST ASYMMETRY: ICD-10-CM

## 2018-12-13 DIAGNOSIS — E78.2 MIXED HYPERLIPIDEMIA: ICD-10-CM

## 2018-12-13 DIAGNOSIS — E55.9 VITAMIN D DEFICIENCY: ICD-10-CM

## 2018-12-13 DIAGNOSIS — H61.23 BILATERAL IMPACTED CERUMEN: ICD-10-CM

## 2018-12-13 DIAGNOSIS — E66.3 OVERWEIGHT (BMI 25.0-29.9): ICD-10-CM

## 2018-12-13 DIAGNOSIS — E03.9 ACQUIRED HYPOTHYROIDISM: ICD-10-CM

## 2018-12-13 DIAGNOSIS — F41.8 DEPRESSION WITH ANXIETY: ICD-10-CM

## 2018-12-13 PROCEDURE — 1036F TOBACCO NON-USER: CPT | Performed by: FAMILY MEDICINE

## 2018-12-13 PROCEDURE — 99215 OFFICE O/P EST HI 40 MIN: CPT | Performed by: FAMILY MEDICINE

## 2018-12-13 NOTE — PROGRESS NOTES
Chief Complaint   Patient presents with    Follow-up     6 month follow up     Health Maintenance   Topic Date Due    Hepatitis C Screening  1955    DTaP,Tdap,and Td Vaccines (1 - Tdap) 06/03/2013    Depression Screening PHQ  04/19/2019    CRC Screening: Colonoscopy  03/30/2026    INFLUENZA VACCINE  Completed     Assessment/Plan:    HTN (hypertension)  Blood pressure is elevated today  Patient was taking decongestants due to cold symptoms for the last few days  Recommended to continue Metoprolol ER 25 mg 1 tablet daily  Follow a low-sodium diet  Avoid decongestants  Will recheck BP in 1 week  Hypothyroidism  Patient ran out of Levothyroxine 50 mcg 1 week ago  Recommended to restart taking medication and check TSH, T4 free in 6 weeks  Mixed hyperlipidemia  Continue Atorvastatin 40 mg daily  Vitamin D deficiency  Start taking Vit D 5000 IU daily on a regular basis  Depression with anxiety  Mood has been stable  Continue Citalopram 40 mg daily, Lamictal 100 mg 2 tablets daily, Clonazepam 2 mg at bedtime  Overweight (BMI 25 0-29  9)  Encouraged regular exercise, weight reduction  Breast asymmetry  Schedule 6 month follow-up R breast diagnostic mammogram in 2/19  Bilateral impacted cerumen  Cerumen impaction removed by irrigation with warm water  TM  intact bilaterally  Patient tolerated procedure well  Schedule follow-up office visit in 6 months  Diagnoses and all orders for this visit:    Essential hypertension    Mixed hyperlipidemia  -     T4, free; Future    Acquired hypothyroidism  -     TSH, 3rd generation with Free T4 reflex; Future    Vitamin D deficiency    Depression with anxiety    Overweight (BMI 25 0-29  9)    Breast asymmetry  -     Cancel: Mammo diagnostic left w cad; Future  -     Mammo diagnostic right w cad; Future    Bilateral impacted cerumen          Subjective:      Patient ID: Eliza Bernard is a 58 y o  female      HPI     Patient is 27-year-old female with HTN, Hypothyroidism, Hyperlipidemia, Vit D deficiency, Depression, Anxiety  She presents for a routine follow-up office visit  Last seen in April 2018  Reviewed current medications, blood work results from   12/11/18  Fasting blood sugar 92, creatinine 0 94, LFTs -within normal limits, Vit D 25 hydroxy 25, TSH 16 26,T4 free 0 9  Patient is getting over a cold  Symptoms improved since last week  C/o mild nasal congestion  Ears feel clogged  HTN -  blood pressure is elevated  Patient states that she has been taking decongestants due to cold symptoms  She takes Metoprolol ER 25 mg daily  Denies chest pain, shortness of breath, dizziness  Hypothyroidism - patient ran out of Levothyroxine 50 mcg 1 week ago  She denies fatigue, hair loss, constipation  No weight gain  Hyperlipidemia -currently taking Atorvastatin 40 mg daily No side effects from statin therapy  Vit D deficiency - patient has not been taking Vit D 5000 IU daily on a regular basis  Depression/ Anxiety - mood has been stable  Patient takes Citalopram, Lamictal, Klonopin  No suicidal ideations  Denies tobacco use  Patient has history of PFO, TIA, 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  She was on anticoagulation with Coumadin which was discontinued  She takes Aspirin 81 mg 2-3 times per week  Mammogram done in 7/18 showed right breast asymmetry  Patient had R breast diagnostic mammogram and   R breast U/S which showed faint 3 mm right breast nodular asymmetry  Radiologist recommended schedule 6 month follow-up right breast diagnostic mammogram     Colonoscopy done in March 2016  Colorectal surgeon Dr Collins July recommended to repeat colonoscopy in 10 years  Pneumovax done in March 2016  Patient had Flu shot in 10/18      The following portions of the patient's history were reviewed and updated as appropriate: allergies, current medications, past family history, past medical history, past social history and problem list     Review of Systems   Constitutional: Negative for activity change, appetite change, chills, fatigue and fever  HENT: Positive for congestion (mild)  Negative for ear pain, hearing loss, nosebleeds and sore throat  Clogged ears   Eyes: Negative for pain, discharge, redness, itching and visual disturbance  Respiratory: Negative for cough, chest tightness, shortness of breath and wheezing  Cardiovascular: Negative for chest pain, palpitations and leg swelling  Gastrointestinal: Negative for abdominal pain, blood in stool, constipation, diarrhea, nausea and rectal pain  Genitourinary: Negative for difficulty urinating, dysuria, flank pain, frequency, hematuria and pelvic pain  Musculoskeletal: Negative for arthralgias, back pain, gait problem, joint swelling and myalgias  Skin: Negative for rash and wound  Neurological: Negative for dizziness, syncope and headaches  Hematological: Negative  Psychiatric/Behavioral: Negative for sleep disturbance and suicidal ideas  Anxiety / Depression - mood has been stable  Objective:      /100 (BP Location: Left arm, Patient Position: Sitting, Cuff Size: Adult)   Pulse 72   Temp 98 7 °F (37 1 °C) (Tympanic)   Resp 16   Wt 76 4 kg (168 lb 6 4 oz)   SpO2 97%   BMI 26 77 kg/m²          Physical Exam   Constitutional: She appears well-developed and well-nourished  HENT:   Head: Normocephalic and atraumatic  Nose: Nose normal    Mouth/Throat: Oropharynx is clear and moist    External ear canals impacted with wax  TM can not be visualized well  Eyes: Pupils are equal, round, and reactive to light  Conjunctivae are normal    Neck: Normal range of motion  Neck supple  Cardiovascular: Normal rate, regular rhythm and normal heart sounds  No murmur heard    No carotid bruits BL  No BL LE edema   Pulmonary/Chest: Effort normal and breath sounds normal  She has no wheezes  She has no rales  Abdominal: Soft  Bowel sounds are normal  There is no tenderness  Musculoskeletal: Normal range of motion  She exhibits no edema, tenderness or deformity  Lymphadenopathy:     She has no cervical adenopathy  Skin: Skin is warm and dry  No rash noted  Psychiatric: She has a normal mood and affect  Nursing note and vitals reviewed

## 2018-12-14 NOTE — ASSESSMENT & PLAN NOTE
Cerumen impaction removed by irrigation with warm water  TM  intact bilaterally  Patient tolerated procedure well

## 2018-12-14 NOTE — ASSESSMENT & PLAN NOTE
Mood has been stable  Continue Citalopram 40 mg daily, Lamictal 100 mg 2 tablets daily, Clonazepam 2 mg at bedtime

## 2018-12-14 NOTE — ASSESSMENT & PLAN NOTE
Patient ran out of Levothyroxine 50 mcg 1 week ago  Recommended to restart taking medication and check TSH, T4 free in 6 weeks

## 2018-12-14 NOTE — ASSESSMENT & PLAN NOTE
Blood pressure is elevated today  Patient was taking decongestants due to cold symptoms for the last few days  Recommended to continue Metoprolol ER 25 mg 1 tablet daily  Follow a low-sodium diet  Avoid decongestants  Will recheck BP in 1 week

## 2018-12-20 ENCOUNTER — CLINICAL SUPPORT (OUTPATIENT)
Dept: FAMILY MEDICINE CLINIC | Facility: CLINIC | Age: 63
End: 2018-12-20
Payer: COMMERCIAL

## 2018-12-20 VITALS — DIASTOLIC BLOOD PRESSURE: 88 MMHG | SYSTOLIC BLOOD PRESSURE: 150 MMHG

## 2018-12-20 DIAGNOSIS — F41.8 DEPRESSION WITH ANXIETY: ICD-10-CM

## 2018-12-20 DIAGNOSIS — I10 ESSENTIAL HYPERTENSION: Primary | ICD-10-CM

## 2018-12-20 DIAGNOSIS — E78.2 MIXED HYPERLIPIDEMIA: ICD-10-CM

## 2018-12-20 PROCEDURE — 99211 OFF/OP EST MAY X REQ PHY/QHP: CPT

## 2018-12-20 RX ORDER — ATORVASTATIN CALCIUM 40 MG/1
40 TABLET, FILM COATED ORAL DAILY
Qty: 30 TABLET | Refills: 6 | Status: SHIPPED | OUTPATIENT
Start: 2018-12-20 | End: 2019-08-01 | Stop reason: SDUPTHER

## 2018-12-20 RX ORDER — LAMOTRIGINE 100 MG/1
200 TABLET ORAL DAILY
Qty: 60 TABLET | Refills: 6 | Status: SHIPPED | OUTPATIENT
Start: 2018-12-20 | End: 2019-08-01 | Stop reason: SDUPTHER

## 2018-12-20 RX ORDER — CITALOPRAM 40 MG/1
40 TABLET ORAL DAILY
Qty: 30 TABLET | Refills: 6 | Status: SHIPPED | OUTPATIENT
Start: 2018-12-20 | End: 2018-12-25 | Stop reason: SDUPTHER

## 2018-12-20 NOTE — PROGRESS NOTES
Patient came in for a 1 week blood pressure check  Patient reports no symptoms  150/88 when I took it in her left arm while patient was sitting    Patient heart rate is 68 at current time  Patient is going to monitor her Blood pressure at home and call us in two weeks with her readings  Patient is currently taking metoprolol XL 25mg daily   Patient PCP went in and talked to her to see how she was doing and to discuss the next step

## 2018-12-21 NOTE — PROGRESS NOTES
Recommended patient to continue Metoprolol ER 25 mg daily  Follow a low-sodium diet, increase exercise  Monitor blood pressure at home and call with blood pressure log in 2 weeks

## 2018-12-24 ENCOUNTER — TELEPHONE (OUTPATIENT)
Dept: FAMILY MEDICINE CLINIC | Facility: CLINIC | Age: 63
End: 2018-12-24

## 2018-12-24 NOTE — TELEPHONE ENCOUNTER
Linus Sandifer, [address] [place] [address] (P: [phone]) phoned to state patient's insurance will not pay for name brand CeleXA 40 mg    Please reorder the script with the generic brand, citalopram

## 2018-12-25 DIAGNOSIS — F41.8 DEPRESSION WITH ANXIETY: ICD-10-CM

## 2018-12-25 RX ORDER — CITALOPRAM 40 MG/1
40 TABLET ORAL DAILY
Qty: 30 TABLET | Refills: 6 | Status: SHIPPED | OUTPATIENT
Start: 2018-12-25 | End: 2018-12-28 | Stop reason: SDUPTHER

## 2018-12-27 NOTE — TELEPHONE ENCOUNTER
Patient phoned again regarding her refill of citalopram - her pharmacy contacted her and stated that they faxed our office and received another script stating the medicine should be name brand rather than generic  Patient is out of her meds and would like to have resolution as soon as possible

## 2018-12-28 ENCOUNTER — TELEPHONE (OUTPATIENT)
Dept: FAMILY MEDICINE CLINIC | Facility: CLINIC | Age: 63
End: 2018-12-28

## 2018-12-28 DIAGNOSIS — F41.8 DEPRESSION WITH ANXIETY: ICD-10-CM

## 2018-12-28 RX ORDER — CITALOPRAM 40 MG/1
40 TABLET ORAL DAILY
Qty: 30 TABLET | Refills: 6 | Status: SHIPPED | OUTPATIENT
Start: 2018-12-28 | End: 2020-01-12

## 2018-12-28 NOTE — TELEPHONE ENCOUNTER
Pharmacy does not even have the brand name Celexa in stock, so we need to send a new E-scribed copy to them without the Brand Necessary button clicked

## 2018-12-28 NOTE — TELEPHONE ENCOUNTER
Patient is calling about the status of the Citalopram 40 mg tablets  Said she has been out of the med for a week  She said there was an issue with [facility] due to Brand Name being clicked and wanted generic  Can be reached at [phone] any questions  Please advise

## 2018-12-28 NOTE — TELEPHONE ENCOUNTER
I did receive the fax from the pharmacy  I sent an electronic authorization to patient's isurance, and they approved her Celexa 40mg 1 tablet daily, until 12/28/2019

## 2018-12-28 NOTE — TELEPHONE ENCOUNTER
I called patient back and left a message letting her know we sent over a new prescription electronically around 12:30pm today, that is for generic version, Citalopram

## 2019-01-15 ENCOUNTER — TELEPHONE (OUTPATIENT)
Dept: FAMILY MEDICINE CLINIC | Facility: CLINIC | Age: 64
End: 2019-01-15

## 2019-01-15 NOTE — TELEPHONE ENCOUNTER
Spoke to Blake about her BP #'s  Wanted Dr Gibbs Crechris to know that within one week  #'s ranged from 139/92 (lowest) to 166/112 (highest)  Blake is currently on metoprolol succinate 25mg, take 1 tablet daily  Please advise Blake as what to do?  [phone]

## 2019-01-16 DIAGNOSIS — I10 ESSENTIAL HYPERTENSION: Primary | ICD-10-CM

## 2019-01-16 RX ORDER — LOSARTAN POTASSIUM 50 MG/1
25 TABLET ORAL DAILY
Qty: 30 TABLET | Refills: 6 | Status: SHIPPED | OUTPATIENT
Start: 2019-01-16 | End: 2019-02-04 | Stop reason: ALTCHOICE

## 2019-01-16 NOTE — TELEPHONE ENCOUNTER
Please call patient  Recommend to start on Losartan 50 mg daily for HTN  Rx sent to pharmacy  Continue Metoprolol ER 25 mg daily  Follow a low sodium diet  Monitor blood pressure at home and call with BP log in 2 weeks  Check BMP in 2 weeks  Order placed in chart

## 2019-01-24 ENCOUNTER — TELEPHONE (OUTPATIENT)
Dept: FAMILY MEDICINE CLINIC | Facility: CLINIC | Age: 64
End: 2019-01-24

## 2019-01-24 NOTE — TELEPHONE ENCOUNTER
Ibrahima Zambrano called with her BP #'s after starting her new medication  AM                                     PM      1-21-19             164/96                              161/109      1-22-19             157/102                            152/104      1-23-19               4:00pm  162/98   (took medication early)       1-23-19               7:00pm  138/86    1-23-19               Before bedtime   160/93       1-24-19              150/99        (took at 3:30pm)  167/95                 Please advise Ibrahima Zambrano as to what she should do next?                   [phone]

## 2019-01-24 NOTE — TELEPHONE ENCOUNTER
Please call patient  To increase dose on Losartan from 50 to 100 mg daily  Continue Metoprolol ER 25 mg daily  Schedule follow-up visit for HTN to review medical regimen and make adjustments if necessary  Ask patient to bring her BP monitor along

## 2019-02-03 LAB
BUN SERPL-MCNC: 20 MG/DL (ref 7–25)
BUN/CREAT SERPL: 20 (CALC) (ref 6–22)
CALCIUM SERPL-MCNC: 9.2 MG/DL (ref 8.6–10.4)
CHLORIDE SERPL-SCNC: 103 MMOL/L (ref 98–110)
CO2 SERPL-SCNC: 31 MMOL/L (ref 20–32)
CREAT SERPL-MCNC: 1.02 MG/DL (ref 0.5–0.99)
GLUCOSE SERPL-MCNC: 93 MG/DL (ref 65–99)
POTASSIUM SERPL-SCNC: 4.5 MMOL/L (ref 3.5–5.3)
SL AMB EGFR AFRICAN AMERICAN: 67 ML/MIN/1.73M2
SL AMB EGFR NON AFRICAN AMERICAN: 58 ML/MIN/1.73M2
SODIUM SERPL-SCNC: 142 MMOL/L (ref 135–146)
T4 FREE SERPL-MCNC: 1.1 NG/DL (ref 0.8–1.8)
TSH SERPL-ACNC: 7.86 MIU/L (ref 0.4–4.5)

## 2019-02-04 ENCOUNTER — OFFICE VISIT (OUTPATIENT)
Dept: FAMILY MEDICINE CLINIC | Facility: CLINIC | Age: 64
End: 2019-02-04
Payer: COMMERCIAL

## 2019-02-04 VITALS
TEMPERATURE: 99.6 F | SYSTOLIC BLOOD PRESSURE: 144 MMHG | OXYGEN SATURATION: 96 % | HEIGHT: 67 IN | RESPIRATION RATE: 20 BRPM | WEIGHT: 169 LBS | HEART RATE: 70 BPM | DIASTOLIC BLOOD PRESSURE: 100 MMHG | BODY MASS INDEX: 26.53 KG/M2

## 2019-02-04 DIAGNOSIS — E78.2 MIXED HYPERLIPIDEMIA: ICD-10-CM

## 2019-02-04 DIAGNOSIS — E03.9 ACQUIRED HYPOTHYROIDISM: ICD-10-CM

## 2019-02-04 DIAGNOSIS — I10 ESSENTIAL HYPERTENSION: Primary | ICD-10-CM

## 2019-02-04 DIAGNOSIS — J06.9 ACUTE UPPER RESPIRATORY INFECTION: ICD-10-CM

## 2019-02-04 PROCEDURE — 1036F TOBACCO NON-USER: CPT | Performed by: FAMILY MEDICINE

## 2019-02-04 PROCEDURE — 3008F BODY MASS INDEX DOCD: CPT | Performed by: FAMILY MEDICINE

## 2019-02-04 PROCEDURE — 99214 OFFICE O/P EST MOD 30 MIN: CPT | Performed by: FAMILY MEDICINE

## 2019-02-04 RX ORDER — LOSARTAN POTASSIUM 100 MG/1
100 TABLET ORAL DAILY
Qty: 30 TABLET | Refills: 6 | Status: SHIPPED | OUTPATIENT
Start: 2019-02-04 | End: 2019-09-20 | Stop reason: SDUPTHER

## 2019-02-04 RX ORDER — METOPROLOL SUCCINATE 25 MG/1
TABLET, EXTENDED RELEASE ORAL
Qty: 60 TABLET | Refills: 6 | Status: SHIPPED | OUTPATIENT
Start: 2019-02-04 | End: 2019-09-20 | Stop reason: SDUPTHER

## 2019-02-04 RX ORDER — GUAIFENESIN 600 MG
1200 TABLET, EXTENDED RELEASE 12 HR ORAL EVERY 12 HOURS SCHEDULED
Qty: 14 TABLET | Refills: 0
Start: 2019-02-04 | End: 2020-01-20 | Stop reason: ALTCHOICE

## 2019-02-04 NOTE — PROGRESS NOTES
Chief Complaint   Patient presents with   [facility] Maintenance   Topic Date Due    Hepatitis C Screening  1955    DTaP,Tdap,and Td Vaccines (1 - Tdap) 06/03/2013    Depression Screening PHQ  04/19/2019    CRC Screening: Colonoscopy  03/30/2026    INFLUENZA VACCINE  Completed     Assessment/Plan:    HTN (hypertension)  BP is poorly controlled  Recommended to take Losartan 100 mg daily in the morning  Increase dose of Metoprolol ER 25 mg to 1 tablet twice daily  Follow a low-sodium diet, regular exercise  Check blood pressure at home and call with blood pressure log in 10- 14 days  Hypothyroidism  TSH 7 86  Recommended to take Levothyroxine 50 mcg one tablet daily Monday through Friday and 1 5 tablet on Saturday and Sunday  Patient was advised to take Levothyroxine on the empty stomach and do not take multivitamins, OTC supplements within 4 -6 hours after taking thyroid medication  Will repeat labs in 2- 3 months  Mixed hyperlipidemia  Continue Atorvastatin 40 mg daily  Follow a  low-cholesterol diet  Acute upper respiratory infection  Patient is getting over a cold  C/o productive cough  Recommended to take Mucinex 600 mg 1 tablet twice daily  Call if cough persists or worsens  I have spent 25 minutes with Patient  today in which greater than 50% of this time was spent in counseling/coordination of care regarding Diagnostic results, Risks and benefits of tx options, Intructions for management, Patient and family education, Importance of tx compliance, Risk factor reductions and Impressions  Keep follow-up office visit as scheduled in June  Diagnoses and all orders for this visit:    Essential hypertension  -     Comprehensive metabolic panel; Future  -     metoprolol succinate (TOPROL-XL) 25 mg 24 hr tablet; Take 1 tab  twice daily  -     losartan (COZAAR) 100 MG tablet;  Take 1 tablet (100 mg total) by mouth daily    Acquired hypothyroidism  - TSH, 3rd generation with Free T4 reflex; Future    Mixed hyperlipidemia  -     Comprehensive metabolic panel; Future  -     Lipid panel; Future    Acute upper respiratory infection  -     guaiFENesin (MUCINEX) 600 mg 12 hr tablet; Take 2 tablets (1,200 mg total) by mouth every 12 (twelve) hours          Subjective:      Patient ID: Feliberto Osorio is a 58 y o  female  HPI     Patient presents for follow-up visit for Hypertension, Hypothyroidism, review blood test results  HTN - blood pressure remains elevated  Dose of Losartan was increased from 50 to 100 mg on January 24, 2019  Patient takes Metoprolol ER 25 mg daily in th evening  Her blood pressure at home ranges 145-157/87- 100  BP at home today was 145/100  Patient denies headache, dizziness  N chest pain, heart palpitations  She had blood work done on 2/2/19  TSH 7 86, T4 free 1 1, fasting blood sugar 93,   creatinine 1 02, potassium 4 5  Hypothyroidism -patient restarted taking   Levothyroxine 50 mcg daily in 12/18  C/o feeling tired  Weight has been stable  Patient is getting over a cold  She developed nasal congestion, cough 10 days ago  Still c/o productive cough  No Sob, chest tightness  No fever or chills  Took Coricidin BP  Denies tobacco use  Patient has Hyperlipidemia  Currently taking Atorvastatin 40 mg daily  The following portions of the patient's history were reviewed and updated as appropriate: current medications, past family history, past medical history, past social history, past surgical history and problem list     Review of Systems   Constitutional: Positive for fatigue (feeling tired)  Negative for activity change, appetite change, chills and fever  HENT: Positive for congestion (mild)  Negative for hearing loss, mouth sores, nosebleeds, sore throat and trouble swallowing  Eyes: Negative for pain, discharge, redness, itching and visual disturbance     Respiratory: Positive for cough (productive cough)  Negative for chest tightness, shortness of breath and wheezing  Cardiovascular: Negative for chest pain, palpitations and leg swelling  Gastrointestinal: Negative for abdominal pain, blood in stool, constipation, diarrhea, nausea and vomiting  Genitourinary: Negative for difficulty urinating, dysuria, flank pain, frequency and hematuria  Musculoskeletal: Negative for arthralgias, joint swelling and myalgias  Skin: Negative for rash and wound  Neurological: Negative for dizziness and headaches  Hematological: Negative  Objective:      /100 (BP Location: Left arm, Patient Position: Sitting, Cuff Size: Standard)   Pulse 70   Temp 99 6 °F (37 6 °C) (Tympanic)   Resp 20   Ht 5' 6 5" (1 689 m)   Wt 76 7 kg (169 lb)   SpO2 96%   BMI 26 87 kg/m²          Physical Exam   Constitutional: She appears well-developed and well-nourished  HENT:   Head: Normocephalic and atraumatic  Right Ear: External ear normal    Left Ear: External ear normal    Mouth/Throat: Oropharynx is clear and moist    Eyes: Pupils are equal, round, and reactive to light  Conjunctivae are normal    Cardiovascular: Normal rate, regular rhythm and normal heart sounds  No murmur heard  No BL LE edema  No carotid bruits BL     Pulmonary/Chest: Effort normal and breath sounds normal  She has no wheezes  She has no rales  Abdominal: Soft  Bowel sounds are normal  There is no tenderness  Musculoskeletal: Normal range of motion  She exhibits no edema, tenderness or deformity  Skin: Skin is warm and dry  No rash noted  Psychiatric: She has a normal mood and affect  Nursing note and vitals reviewed

## 2019-02-05 NOTE — ASSESSMENT & PLAN NOTE
TSH 7 86  Recommended to take Levothyroxine 50 mcg one tablet daily Monday through Friday and 1 5 tablet on Saturday and Sunday  Patient was advised to take Levothyroxine on the empty stomach and do not take multivitamins, OTC supplements within 4 -6 hours after taking thyroid medication  Will repeat labs in 2- 3 months

## 2019-02-05 NOTE — ASSESSMENT & PLAN NOTE
Patient is getting over a cold  C/o productive cough  Recommended to take Mucinex 600 mg 1 tablet twice daily  Call if cough persists or worsens

## 2019-02-05 NOTE — ASSESSMENT & PLAN NOTE
BP is poorly controlled  Recommended to take Losartan 100 mg daily in the morning  Increase dose of Metoprolol ER 25 mg to 1 tablet twice daily  Follow a low-sodium diet, regular exercise  Check blood pressure at home and call with blood pressure log in 10- 14 days

## 2019-06-12 DIAGNOSIS — E03.9 ACQUIRED HYPOTHYROIDISM: ICD-10-CM

## 2019-06-12 DIAGNOSIS — F41.9 ANXIETY DISORDER, UNSPECIFIED TYPE: ICD-10-CM

## 2019-06-12 RX ORDER — LEVOTHYROXINE SODIUM 0.05 MG/1
TABLET ORAL
Qty: 30 TABLET | Refills: 5 | Status: SHIPPED | OUTPATIENT
Start: 2019-06-12 | End: 2019-12-30 | Stop reason: SDUPTHER

## 2019-06-12 RX ORDER — CLONAZEPAM 2 MG/1
TABLET ORAL
Qty: 30 TABLET | Refills: 5 | Status: SHIPPED | OUTPATIENT
Start: 2019-06-12 | End: 2020-01-10 | Stop reason: SDUPTHER

## 2019-08-01 DIAGNOSIS — F41.8 DEPRESSION WITH ANXIETY: ICD-10-CM

## 2019-08-01 DIAGNOSIS — E78.2 MIXED HYPERLIPIDEMIA: ICD-10-CM

## 2019-08-01 RX ORDER — ATORVASTATIN CALCIUM 40 MG/1
TABLET, FILM COATED ORAL
Qty: 30 TABLET | Refills: 6 | Status: SHIPPED | OUTPATIENT
Start: 2019-08-01 | End: 2020-02-17

## 2019-08-01 RX ORDER — LAMOTRIGINE 100 MG/1
TABLET ORAL
Qty: 60 TABLET | Refills: 6 | Status: SHIPPED | OUTPATIENT
Start: 2019-08-01 | End: 2020-01-20 | Stop reason: DRUGHIGH

## 2019-09-20 DIAGNOSIS — I10 ESSENTIAL HYPERTENSION: ICD-10-CM

## 2019-09-20 RX ORDER — LOSARTAN POTASSIUM 100 MG/1
TABLET ORAL
Qty: 30 TABLET | Refills: 6 | Status: SHIPPED | OUTPATIENT
Start: 2019-09-20 | End: 2020-04-23

## 2019-09-20 RX ORDER — METOPROLOL SUCCINATE 25 MG/1
TABLET, EXTENDED RELEASE ORAL
Qty: 60 TABLET | Refills: 6 | Status: SHIPPED | OUTPATIENT
Start: 2019-09-20 | End: 2020-04-23

## 2019-12-30 DIAGNOSIS — E03.9 ACQUIRED HYPOTHYROIDISM: ICD-10-CM

## 2019-12-30 RX ORDER — LEVOTHYROXINE SODIUM 0.05 MG/1
TABLET ORAL
Qty: 30 TABLET | Refills: 5 | Status: SHIPPED | OUTPATIENT
Start: 2019-12-30 | End: 2020-01-20 | Stop reason: SDUPTHER

## 2020-01-10 DIAGNOSIS — F41.9 ANXIETY DISORDER, UNSPECIFIED TYPE: ICD-10-CM

## 2020-01-10 RX ORDER — CLONAZEPAM 2 MG/1
2 TABLET ORAL
Qty: 30 TABLET | Refills: 5 | Status: SHIPPED | OUTPATIENT
Start: 2020-01-10 | End: 2020-07-27

## 2020-01-10 NOTE — TELEPHONE ENCOUNTER
Refill and Lab order request    Clonazepam 2 mg take 1 tablet at bedtime (qty: 30)    [facility] [phone] [facility]    Patient also made appointment 1/20 and she is requesting lab order for this appt?    Patient asked if we can call her once completed so she can

## 2020-01-12 DIAGNOSIS — F41.8 DEPRESSION WITH ANXIETY: ICD-10-CM

## 2020-01-12 RX ORDER — CITALOPRAM 40 MG/1
TABLET ORAL
Qty: 30 TABLET | Refills: 6 | Status: SHIPPED | OUTPATIENT
Start: 2020-01-12 | End: 2020-10-14

## 2020-01-17 ENCOUNTER — TELEPHONE (OUTPATIENT)
Dept: FAMILY MEDICINE CLINIC | Facility: CLINIC | Age: 65
End: 2020-01-17

## 2020-01-17 DIAGNOSIS — E55.9 VITAMIN D DEFICIENCY: ICD-10-CM

## 2020-01-17 DIAGNOSIS — E78.2 MIXED HYPERLIPIDEMIA: Primary | ICD-10-CM

## 2020-01-17 DIAGNOSIS — E03.9 ACQUIRED HYPOTHYROIDISM: ICD-10-CM

## 2020-01-17 DIAGNOSIS — I10 ESSENTIAL HYPERTENSION: ICD-10-CM

## 2020-01-17 NOTE — TELEPHONE ENCOUNTER
Patient has routine appt Monday 1/20/20   Needs orders for labs placed, is going to [facility]

## 2020-01-19 PROBLEM — H61.23 BILATERAL IMPACTED CERUMEN: Status: RESOLVED | Noted: 2018-12-13 | Resolved: 2020-01-19

## 2020-01-19 PROBLEM — J06.9 ACUTE UPPER RESPIRATORY INFECTION: Status: RESOLVED | Noted: 2019-02-04 | Resolved: 2020-01-19

## 2020-01-19 LAB
25(OH)D3 SERPL-MCNC: 17 NG/ML (ref 30–100)
ALBUMIN SERPL-MCNC: 4.3 G/DL (ref 3.6–5.1)
ALBUMIN/GLOB SERPL: 1.7 (CALC) (ref 1–2.5)
ALP SERPL-CCNC: 74 U/L (ref 33–130)
ALT SERPL-CCNC: 14 U/L (ref 6–29)
AST SERPL-CCNC: 14 U/L (ref 10–35)
BASOPHILS # BLD AUTO: 30 CELLS/UL (ref 0–200)
BASOPHILS NFR BLD AUTO: 0.8 %
BILIRUB SERPL-MCNC: 0.7 MG/DL (ref 0.2–1.2)
BUN SERPL-MCNC: 16 MG/DL (ref 7–25)
BUN/CREAT SERPL: NORMAL (CALC) (ref 6–22)
CALCIUM SERPL-MCNC: 9 MG/DL (ref 8.6–10.4)
CHLORIDE SERPL-SCNC: 106 MMOL/L (ref 98–110)
CHOLEST SERPL-MCNC: 184 MG/DL
CHOLEST/HDLC SERPL: 3.2 (CALC)
CO2 SERPL-SCNC: 30 MMOL/L (ref 20–32)
CREAT SERPL-MCNC: 0.95 MG/DL (ref 0.5–0.99)
EOSINOPHIL # BLD AUTO: 38 CELLS/UL (ref 15–500)
EOSINOPHIL NFR BLD AUTO: 1 %
ERYTHROCYTE [DISTWIDTH] IN BLOOD BY AUTOMATED COUNT: 12.5 % (ref 11–15)
GLOBULIN SER CALC-MCNC: 2.6 G/DL (CALC) (ref 1.9–3.7)
GLUCOSE SERPL-MCNC: 98 MG/DL (ref 65–99)
HCT VFR BLD AUTO: 39 % (ref 35–45)
HDLC SERPL-MCNC: 58 MG/DL
HGB BLD-MCNC: 13.1 G/DL (ref 11.7–15.5)
LDLC SERPL CALC-MCNC: 109 MG/DL (CALC)
LYMPHOCYTES # BLD AUTO: 1319 CELLS/UL (ref 850–3900)
LYMPHOCYTES NFR BLD AUTO: 34.7 %
MCH RBC QN AUTO: 29.4 PG (ref 27–33)
MCHC RBC AUTO-ENTMCNC: 33.6 G/DL (ref 32–36)
MCV RBC AUTO: 87.4 FL (ref 80–100)
MONOCYTES # BLD AUTO: 228 CELLS/UL (ref 200–950)
MONOCYTES NFR BLD AUTO: 6 %
NEUTROPHILS # BLD AUTO: 2185 CELLS/UL (ref 1500–7800)
NEUTROPHILS NFR BLD AUTO: 57.5 %
NONHDLC SERPL-MCNC: 126 MG/DL (CALC)
PLATELET # BLD AUTO: 232 THOUSAND/UL (ref 140–400)
PMV BLD REES-ECKER: 10.4 FL (ref 7.5–12.5)
POTASSIUM SERPL-SCNC: 4.4 MMOL/L (ref 3.5–5.3)
PROT SERPL-MCNC: 6.9 G/DL (ref 6.1–8.1)
RBC # BLD AUTO: 4.46 MILLION/UL (ref 3.8–5.1)
SL AMB EGFR AFRICAN AMERICAN: 73 ML/MIN/1.73M2
SL AMB EGFR NON AFRICAN AMERICAN: 63 ML/MIN/1.73M2
SODIUM SERPL-SCNC: 141 MMOL/L (ref 135–146)
T4 FREE SERPL-MCNC: 0.9 NG/DL (ref 0.8–1.8)
TRIGL SERPL-MCNC: 77 MG/DL
TSH SERPL-ACNC: 5.49 MIU/L (ref 0.4–4.5)
WBC # BLD AUTO: 3.8 THOUSAND/UL (ref 3.8–10.8)

## 2020-01-20 ENCOUNTER — OFFICE VISIT (OUTPATIENT)
Dept: FAMILY MEDICINE CLINIC | Facility: CLINIC | Age: 65
End: 2020-01-20
Payer: COMMERCIAL

## 2020-01-20 VITALS
DIASTOLIC BLOOD PRESSURE: 84 MMHG | OXYGEN SATURATION: 98 % | BODY MASS INDEX: 29.03 KG/M2 | RESPIRATION RATE: 16 BRPM | HEIGHT: 67 IN | TEMPERATURE: 98.1 F | HEART RATE: 68 BPM | SYSTOLIC BLOOD PRESSURE: 140 MMHG | WEIGHT: 185 LBS

## 2020-01-20 DIAGNOSIS — E03.9 ACQUIRED HYPOTHYROIDISM: ICD-10-CM

## 2020-01-20 DIAGNOSIS — E66.3 OVERWEIGHT (BMI 25.0-29.9): ICD-10-CM

## 2020-01-20 DIAGNOSIS — E55.9 VITAMIN D DEFICIENCY: ICD-10-CM

## 2020-01-20 DIAGNOSIS — E78.2 MIXED HYPERLIPIDEMIA: ICD-10-CM

## 2020-01-20 DIAGNOSIS — I10 ESSENTIAL HYPERTENSION: Primary | ICD-10-CM

## 2020-01-20 DIAGNOSIS — N64.89 BREAST ASYMMETRY: ICD-10-CM

## 2020-01-20 DIAGNOSIS — F41.8 DEPRESSION WITH ANXIETY: ICD-10-CM

## 2020-01-20 DIAGNOSIS — Z12.39 SCREENING FOR BREAST CANCER: ICD-10-CM

## 2020-01-20 PROCEDURE — 3008F BODY MASS INDEX DOCD: CPT | Performed by: FAMILY MEDICINE

## 2020-01-20 PROCEDURE — 1160F RVW MEDS BY RX/DR IN RCRD: CPT | Performed by: FAMILY MEDICINE

## 2020-01-20 PROCEDURE — 1100F PTFALLS ASSESS-DOCD GE2>/YR: CPT | Performed by: FAMILY MEDICINE

## 2020-01-20 PROCEDURE — 99214 OFFICE O/P EST MOD 30 MIN: CPT | Performed by: FAMILY MEDICINE

## 2020-01-20 PROCEDURE — 3288F FALL RISK ASSESSMENT DOCD: CPT | Performed by: FAMILY MEDICINE

## 2020-01-20 RX ORDER — LEVOTHYROXINE SODIUM 0.05 MG/1
TABLET ORAL
Qty: 45 TABLET | Refills: 5 | Status: SHIPPED | OUTPATIENT
Start: 2020-01-20 | End: 2020-08-26

## 2020-01-20 RX ORDER — LAMOTRIGINE 200 MG/1
200 TABLET ORAL DAILY
COMMUNITY
Start: 2020-01-12 | End: 2020-02-17

## 2020-01-20 RX ORDER — ERGOCALCIFEROL 1.25 MG/1
50000 CAPSULE ORAL WEEKLY
Qty: 12 CAPSULE | Refills: 1 | Status: SHIPPED | OUTPATIENT
Start: 2020-01-20 | End: 2020-07-21 | Stop reason: SDUPTHER

## 2020-01-20 NOTE — PROGRESS NOTES
Chief Complaint   Patient presents with    Follow-up     6 month follow up     Health Maintenance   Topic Date Due    Hepatitis C Screening  1955    DTaP,Tdap,and Td Vaccines (1 - Tdap) 09/25/1965    HIV Screening  09/25/1969    BMI: Followup Plan  03/12/2019    Annual Physical  03/12/2019    MAMMOGRAM  08/07/2019    Falls: Plan of Care  09/25/2019    Pneumococcal Vaccine: 65+ Years (1 of 2 - PCV13) 09/25/2019    Fall Risk  01/20/2021    BMI: Adult  01/20/2021    CRC Screening: Colonoscopy  03/30/2026    Influenza Vaccine  Completed    Pneumococcal Vaccine: Pediatrics (0 to 5 Years) and At-Risk Patients (6 to 59 Years)  Aged Out    HIB Vaccine  Aged Out    Hepatitis B Vaccine  Aged Out    IPV Vaccine  Aged Out    Hepatitis A Vaccine  Aged Out    Meningococcal ACWY Vaccine  Aged Out    HPV Vaccine  Aged Out     BMI Counseling: Body mass index is 29 41 kg/m²  The BMI is above normal  Nutrition recommendations include reducing portion sizes, decreasing overall calorie intake, 3-5 servings of fruits/vegetables daily, reducing fast food intake, consuming healthier snacks, decreasing soda and/or juice intake, moderation in carbohydrate intake, increasing intake of lean protein, reducing intake of saturated fat and trans fat and reducing intake of cholesterol  Exercise recommendations include moderate aerobic physical activity for 150 minutes/week  Assessment/Plan:    HTN (hypertension)  Continue Losartan 100 mg daily, Metoprolol 25 mg twice daily  Recommended to follow a low-sodium diet  Monitor blood pressure at home and call with BP log in 2 weeks  Hypothyroidism  TSH 5 49  Recommended to increase dose of Levothyroxine to 75 mcg on Friday, Saturday and Sunday, take 50 mcg all other days  Recheck TSH in 2 months  Mixed hyperlipidemia  Controlled on Atorvastatin 40 mg daily  Vitamin D deficiency  Start Vit D 50,000 IU one caps   once a week for 3 month, then switch to OTC Vit D 5000 IU daily  Breast asymmetry  Refusing to schedule right breast diagnostic mammogram   because high deductible  She agreed to schedule bilateral screening mammogram      Depression with anxiety  Mood has been stable  Continue Citalopram 40 mg daily, Lamictal 200 mg daily, Klonopin 2 mg at bedtime  Overweight (BMI 25 0-29  9)  Encouraged patient to start regular exercise, lose weight  I have spent 25 minutes with Patient  today in which greater than 50% of this time was spent in counseling/coordination of care regarding Diagnostic results, Risks and benefits of tx options, Intructions for management, Patient and family education, Importance of tx compliance, Risk factor reductions and Impressions  Schedule follow-up visit in 6 months  Diagnoses and all orders for this visit:    Essential hypertension    Acquired hypothyroidism  -     TSH, 3rd generation with Free T4 reflex; Future  -     TSH, 3rd generation with Free T4 reflex  -     levothyroxine 50 mcg tablet; Take 1 tab  daily Mon- Th and 1 5 tab  Friday, Sat, Sun     Mixed hyperlipidemia    Vitamin D deficiency  -     ergocalciferol (VITAMIN D2) 50,000 units; Take 1 capsule (50,000 Units total) by mouth once a week    Breast asymmetry    Screening for breast cancer  -     Mammo screening bilateral w cad; Future    Depression with anxiety    Overweight (BMI 25 0-29  9)    Other orders  -     Cancel: TSH, 3rd generation with Free T4 reflex; Future  -     Cancel: Mammo diagnostic right w cad; Future  -     Cancel: Mammo screening left w cad; Future  -     lamoTRIgine (LaMICtal) 200 MG tablet; Take 200 mg by mouth daily           Subjective:      Patient ID: Viral Rogers is a 71 y o  female  HPI     Patient presents for routine follow-up office visit  She was last seen in February 2019  PMHx: HTN, Hyperlipidemia, Hypothyroidism, Obesity, Depression, Anxiety      Reviewed current medications, blood work results from January 18, 2020  TSH 5 49, T4 free 0 9, fasting blood sugar 98, creatinine 0 95, potassium 4 4  Cholesterol 184, HDL 58, triglycerides 77,   Vit D 25 hydroxy 17  HTN - patient takes Losartan 100 mg daily, Metoprolol ER 25 mg twice daily  She checks blood pressure at home periodically  Blood pressure ranges in 140's/ 80's  Denies chest pain, shortness of breath, dizziness  Hypothyroidism - currently taking Levothyroxine 50 mcg 1 tablet daily Monday through Friday and 1 5  tab  on Saturday and Sunday  C/o feeling tired  She has not been exercising and not following a healthy diet for the last few months  Gained 16 lb since 2/19  Hyperlipidemia - on Aatorvastatin 40 mg daily  Denies side effects from statin therapy  Denies tobacco use  Depression / Anxiety - mood has been stable  Patient takes Citalopram 40 mg daily, Klonopin 2 mg at bedtime, Lamictal 200 mg daily  Patient had mammogram in August 2018 which showed right breast nodular asymmetry  She was recommended to schedule diagnostic mammogram of the right breast       Patient states that she cannot afford to pay for diagnostic testing  She will only schedule screening mammogram ( her insurance will cover screening mammograrm without additional cost)    Colonoscopy performed in 3/16 by colorectal surgeon Dr Erwin Allen showed severe diverticulosis, one polyp was remowed  Dr Erwin Allen recommended to repeat colonoscopy in 10 years  She had Pneumovax vaccination in 3/16  The following portions of the patient's history were reviewed and updated as appropriate: allergies, past family history, past medical history, past social history, past surgical history and problem list     Review of Systems   Constitutional: Positive for fatigue  Negative for activity change, appetite change, chills and fever     HENT: Negative for congestion, ear pain, hearing loss, mouth sores, nosebleeds, sore throat, tinnitus and trouble swallowing  Eyes: Negative for pain, discharge, redness, itching and visual disturbance  Respiratory: Negative for cough, chest tightness, shortness of breath and wheezing  Cardiovascular: Negative for chest pain, palpitations and leg swelling  Gastrointestinal: Positive for constipation (occasional)  Negative for abdominal pain, blood in stool, diarrhea, nausea and vomiting  Genitourinary: Negative for difficulty urinating, dysuria, flank pain, frequency, hematuria and pelvic pain  Musculoskeletal: Negative for arthralgias, back pain, gait problem, joint swelling, myalgias and neck pain  Skin: Negative for rash and wound  Neurological: Negative for dizziness and headaches  Hematological: Negative  Psychiatric/Behavioral: Negative for sleep disturbance and suicidal ideas  The patient is not nervous/anxious  Denies feeling depressed         Objective:      /84 (BP Location: Left arm, Patient Position: Sitting, Cuff Size: Standard)   Pulse 68   Temp 98 1 °F (36 7 °C) (Tympanic)   Resp 16   Ht 5' 6 5" (1 689 m)   Wt 83 9 kg (185 lb)   SpO2 98%   BMI 29 41 kg/m²          Physical Exam   Constitutional: She appears well-developed and well-nourished  Overweight   HENT:   Head: Normocephalic and atraumatic  Right Ear: External ear normal    Left Ear: External ear normal    Mouth/Throat: Oropharynx is clear and moist    Eyes: Pupils are equal, round, and reactive to light  Conjunctivae are normal    Cardiovascular: Normal rate, regular rhythm and normal heart sounds  No murmur heard  No carotid bruits BL  No BL LE edema   Pulmonary/Chest: Effort normal and breath sounds normal    Abdominal: Soft  Bowel sounds are normal  There is no tenderness  Musculoskeletal: Normal range of motion  She exhibits no edema, tenderness or deformity  Skin: Skin is warm and dry  No rash noted  Psychiatric: She has a normal mood and affect  Nursing note and vitals reviewed

## 2020-01-21 NOTE — ASSESSMENT & PLAN NOTE
Refusing to schedule right breast diagnostic mammogram   because high deductible    She agreed to schedule bilateral screening mammogram

## 2020-01-21 NOTE — ASSESSMENT & PLAN NOTE
Mood has been stable  Continue Citalopram 40 mg daily, Lamictal 200 mg daily, Klonopin 2 mg at bedtime

## 2020-01-21 NOTE — ASSESSMENT & PLAN NOTE
TSH 5 49  Recommended to increase dose of Levothyroxine to 75 mcg on Friday, Saturday and Sunday, take 50 mcg all other days  Recheck TSH in 2 months

## 2020-01-21 NOTE — ASSESSMENT & PLAN NOTE
Continue Losartan 100 mg daily, Metoprolol 25 mg twice daily  Recommended to follow a low-sodium diet  Monitor blood pressure at home and call with BP log in 2 weeks

## 2020-02-11 ENCOUNTER — OFFICE VISIT (OUTPATIENT)
Dept: FAMILY MEDICINE CLINIC | Facility: CLINIC | Age: 65
End: 2020-02-11
Payer: COMMERCIAL

## 2020-02-11 VITALS
BODY MASS INDEX: 29.1 KG/M2 | HEIGHT: 67 IN | HEART RATE: 70 BPM | RESPIRATION RATE: 16 BRPM | WEIGHT: 185.4 LBS | SYSTOLIC BLOOD PRESSURE: 140 MMHG | DIASTOLIC BLOOD PRESSURE: 90 MMHG | TEMPERATURE: 98.9 F | OXYGEN SATURATION: 97 %

## 2020-02-11 DIAGNOSIS — Z00.00 WELL ADULT EXAM: Primary | ICD-10-CM

## 2020-02-11 DIAGNOSIS — Z11.1 SCREENING FOR TUBERCULOSIS: ICD-10-CM

## 2020-02-11 DIAGNOSIS — I10 ESSENTIAL HYPERTENSION: ICD-10-CM

## 2020-02-11 PROCEDURE — 86580 TB INTRADERMAL TEST: CPT

## 2020-02-11 PROCEDURE — 3080F DIAST BP >= 90 MM HG: CPT | Performed by: FAMILY MEDICINE

## 2020-02-11 PROCEDURE — 99397 PER PM REEVAL EST PAT 65+ YR: CPT | Performed by: FAMILY MEDICINE

## 2020-02-11 PROCEDURE — 3077F SYST BP >= 140 MM HG: CPT | Performed by: FAMILY MEDICINE

## 2020-02-11 NOTE — ASSESSMENT & PLAN NOTE
Patient was recommended to follow a well-balanced diet, regular exercise  Schedule screening mammogram as ordered last month  PPD placed today  Patient will come in 2 days to read PPD  Will fill out physical exam form for work

## 2020-02-11 NOTE — PROGRESS NOTES
Assessment/Plan:    Well adult exam  Patient was recommended to follow a well-balanced diet, regular exercise  Schedule screening mammogram as ordered last month  PPD placed today  Patient will come in 2 days to read PPD  Will fill out physical exam form for work  HTN (hypertension)  BP is elevated today  Goal for BP < 140/80  Recommended to take Losartan 100 mg daily, Metoprolol ER 25 mg twice daily on a regular basis  Follow a low-sodium diet, encouraged regular exercise, work on weight reduction  Diagnoses and all orders for this visit:    Well adult exam    Screening for tuberculosis  -     TB Skin Test    Essential hypertension          Subjective:      Patient ID: Tara Gutiérrez is a 71 y o  female  HPI     Patient came for well adult physical exam       She brought physical exam form to fill out for work  She works at 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  Patient denies prior history of positive PPD test     No exposure to tuberculosis  PMHx: HTN, Hyperlipidemia, Hypothyroidism, Obesity, Depression, Anxiety  No changes in medications since last visit  HTN - currently taking Losartan 100 mg daily, Metoprolol ER 25 mg twice daily  Patient denies chest pain, shortness of breath, dizziness  Denies tobacco use  Patient had mammogram in August 2018 which showed right breast nodular asymmetry  She was recommended to schedule diagnostic mammogram of the right breast     Patient states that she has a high deductible, declined scheduling diagnostic mammogram   She is agreeable to schedule screening mammogram       Colonoscopy was performed in March 2016 by colorectal surgeon Dr Altagracia Ramos, 1 polyp was removed  Colonoscopy showed severe diverticulosis  Dr Altagracia Ramos recommended to repeat colonoscopy in 10 years  Patient had Flu shot in October 2019  Pneumovax done March 2016      The following portions of the patient's history were reviewed and updated as appropriate: allergies, past family history, past medical history, past social history, past surgical history and problem list     Review of Systems   Constitutional: Positive for fatigue  Negative for activity change, appetite change, chills and fever  Unexpected weight change: mild  HENT: Negative for congestion, ear pain, hearing loss, mouth sores, nosebleeds, sore throat, tinnitus and trouble swallowing  Eyes: Negative for pain, discharge, redness, itching and visual disturbance  Respiratory: Negative for cough, chest tightness, shortness of breath and wheezing  Cardiovascular: Negative for chest pain, palpitations and leg swelling  Gastrointestinal: Positive for constipation (occasional)  Negative for abdominal pain, blood in stool, diarrhea, nausea and vomiting  Genitourinary: Negative for difficulty urinating, dysuria, flank pain, frequency and hematuria  Musculoskeletal: Negative for arthralgias, back pain, gait problem, joint swelling and neck pain  Skin: Negative for rash and wound  Neurological: Negative for dizziness, syncope and headaches  Hematological: Negative  Psychiatric/Behavioral: Negative for sleep disturbance and suicidal ideas  The patient is not nervous/anxious  Objective:      /90 (BP Location: Left arm, Patient Position: Sitting, Cuff Size: Standard)   Pulse 70   Temp 98 9 °F (37 2 °C) (Tympanic)   Resp 16   Ht 5' 6 5" (1 689 m)   Wt 84 1 kg (185 lb 6 4 oz)   SpO2 97%   BMI 29 48 kg/m²          Physical Exam   Constitutional: She is oriented to person, place, and time  She appears well-developed and well-nourished  HENT:   Head: Normocephalic and atraumatic  Right Ear: External ear normal    Left Ear: External ear normal    Mouth/Throat: Oropharynx is clear and moist    Eyes: Pupils are equal, round, and reactive to light  Conjunctivae are normal    Cardiovascular: Normal rate, regular rhythm and normal heart sounds  No murmur heard    No BL LE edema  No carotid bruits BL   Pulmonary/Chest: Effort normal and breath sounds normal    Abdominal: Soft  Bowel sounds are normal  There is no tenderness  Musculoskeletal:   No joints swelling or tenderness   Neurological: She is alert and oriented to person, place, and time  No cranial nerve deficit  Coordination normal    Skin: Skin is warm and dry  No rash noted  Psychiatric: She has a normal mood and affect  Nursing note and vitals reviewed

## 2020-02-11 NOTE — ASSESSMENT & PLAN NOTE
BP is elevated today  Goal for BP < 140/80  Recommended to take Losartan 100 mg daily, Metoprolol ER 25 mg twice daily on a regular basis  Follow a low-sodium diet, encouraged regular exercise, work on weight reduction

## 2020-02-13 ENCOUNTER — CLINICAL SUPPORT (OUTPATIENT)
Dept: FAMILY MEDICINE CLINIC | Facility: CLINIC | Age: 65
End: 2020-02-13

## 2020-02-13 DIAGNOSIS — Z11.1 ENCOUNTER FOR PPD SKIN TEST READING: Primary | ICD-10-CM

## 2020-02-13 LAB
INDURATION: 0 MM
TB SKIN TEST: NEGATIVE

## 2020-02-17 DIAGNOSIS — F41.8 DEPRESSION WITH ANXIETY: Primary | ICD-10-CM

## 2020-02-17 DIAGNOSIS — E78.2 MIXED HYPERLIPIDEMIA: ICD-10-CM

## 2020-02-17 RX ORDER — ATORVASTATIN CALCIUM 40 MG/1
TABLET, FILM COATED ORAL
Qty: 30 TABLET | Refills: 6 | Status: SHIPPED | OUTPATIENT
Start: 2020-02-17 | End: 2020-03-13

## 2020-02-17 RX ORDER — LAMOTRIGINE 200 MG/1
TABLET ORAL
Qty: 30 TABLET | Refills: 6 | Status: SHIPPED | OUTPATIENT
Start: 2020-02-17 | End: 2020-03-13

## 2020-03-13 DIAGNOSIS — F41.8 DEPRESSION WITH ANXIETY: ICD-10-CM

## 2020-03-13 DIAGNOSIS — E78.2 MIXED HYPERLIPIDEMIA: ICD-10-CM

## 2020-03-13 RX ORDER — LAMOTRIGINE 200 MG/1
TABLET ORAL
Qty: 30 TABLET | Refills: 6 | Status: SHIPPED | OUTPATIENT
Start: 2020-03-13 | End: 2021-03-10

## 2020-03-13 RX ORDER — ATORVASTATIN CALCIUM 40 MG/1
TABLET, FILM COATED ORAL
Qty: 30 TABLET | Refills: 6 | Status: SHIPPED | OUTPATIENT
Start: 2020-03-13 | End: 2021-02-22

## 2020-04-23 DIAGNOSIS — I10 ESSENTIAL HYPERTENSION: ICD-10-CM

## 2020-04-23 RX ORDER — LOSARTAN POTASSIUM 100 MG/1
TABLET ORAL
Qty: 30 TABLET | Refills: 6 | Status: SHIPPED | OUTPATIENT
Start: 2020-04-23 | End: 2021-01-20

## 2020-04-23 RX ORDER — METOPROLOL SUCCINATE 25 MG/1
TABLET, EXTENDED RELEASE ORAL
Qty: 60 TABLET | Refills: 6 | Status: SHIPPED | OUTPATIENT
Start: 2020-04-23 | End: 2021-03-29

## 2020-05-18 DIAGNOSIS — I10 ESSENTIAL HYPERTENSION: ICD-10-CM

## 2020-05-18 RX ORDER — METOPROLOL SUCCINATE 25 MG/1
TABLET, EXTENDED RELEASE ORAL
Qty: 60 TABLET | Refills: 6 | OUTPATIENT
Start: 2020-05-18

## 2020-05-18 RX ORDER — LOSARTAN POTASSIUM 100 MG/1
TABLET ORAL
Qty: 30 TABLET | Refills: 6 | OUTPATIENT
Start: 2020-05-18

## 2020-05-30 ENCOUNTER — TELEPHONE (OUTPATIENT)
Dept: FAMILY MEDICINE CLINIC | Facility: CLINIC | Age: 65
End: 2020-05-30

## 2020-05-30 ENCOUNTER — NURSE TRIAGE (OUTPATIENT)
Dept: OTHER | Facility: OTHER | Age: 65
End: 2020-05-30

## 2020-06-01 ENCOUNTER — APPOINTMENT (EMERGENCY)
Dept: RADIOLOGY | Facility: HOSPITAL | Age: 65
End: 2020-06-01
Payer: COMMERCIAL

## 2020-06-01 ENCOUNTER — HOSPITAL ENCOUNTER (EMERGENCY)
Facility: HOSPITAL | Age: 65
Discharge: HOME | End: 2020-06-01
Attending: EMERGENCY MEDICINE | Admitting: EMERGENCY MEDICINE
Payer: COMMERCIAL

## 2020-06-01 VITALS
WEIGHT: 185 LBS | BODY MASS INDEX: 29.41 KG/M2 | SYSTOLIC BLOOD PRESSURE: 163 MMHG | OXYGEN SATURATION: 97 % | DIASTOLIC BLOOD PRESSURE: 87 MMHG | HEART RATE: 87 BPM | RESPIRATION RATE: 16 BRPM | TEMPERATURE: 98.9 F

## 2020-06-01 DIAGNOSIS — R10.32 ACUTE ABDOMINAL PAIN IN LEFT LOWER QUADRANT: Primary | ICD-10-CM

## 2020-06-01 DIAGNOSIS — K57.92 DIVERTICULITIS: Primary | ICD-10-CM

## 2020-06-01 LAB
ALBUMIN SERPL BCP-MCNC: 3.9 G/DL (ref 3.5–5)
ALP SERPL-CCNC: 80 U/L (ref 46–116)
ALT SERPL W P-5'-P-CCNC: 19 U/L (ref 12–78)
ANION GAP SERPL CALCULATED.3IONS-SCNC: 4 MMOL/L (ref 4–13)
AST SERPL W P-5'-P-CCNC: 15 U/L (ref 5–45)
BASOPHILS # BLD AUTO: 0.05 THOUSANDS/ΜL (ref 0–0.1)
BASOPHILS NFR BLD AUTO: 1 % (ref 0–1)
BILIRUB SERPL-MCNC: 0.64 MG/DL (ref 0.2–1)
BUN SERPL-MCNC: 19 MG/DL (ref 5–25)
CALCIUM SERPL-MCNC: 8.9 MG/DL (ref 8.3–10.1)
CHLORIDE SERPL-SCNC: 108 MMOL/L (ref 100–108)
CO2 SERPL-SCNC: 26 MMOL/L (ref 21–32)
CREAT SERPL-MCNC: 1.08 MG/DL (ref 0.6–1.3)
EOSINOPHIL # BLD AUTO: 0.04 THOUSAND/ΜL (ref 0–0.61)
EOSINOPHIL NFR BLD AUTO: 1 % (ref 0–6)
ERYTHROCYTE [DISTWIDTH] IN BLOOD BY AUTOMATED COUNT: 13.9 % (ref 11.6–15.1)
GFR SERPL CREATININE-BSD FRML MDRD: 54 ML/MIN/1.73SQ M
GLUCOSE SERPL-MCNC: 90 MG/DL (ref 65–140)
HCT VFR BLD AUTO: 41.7 % (ref 34.8–46.1)
HGB BLD-MCNC: 13 G/DL (ref 11.5–15.4)
IMM GRANULOCYTES # BLD AUTO: 0.02 THOUSAND/UL (ref 0–0.2)
IMM GRANULOCYTES NFR BLD AUTO: 0 % (ref 0–2)
LIPASE SERPL-CCNC: 167 U/L (ref 73–393)
LYMPHOCYTES # BLD AUTO: 1.78 THOUSANDS/ΜL (ref 0.6–4.47)
LYMPHOCYTES NFR BLD AUTO: 36 % (ref 14–44)
MCH RBC QN AUTO: 28.9 PG (ref 26.8–34.3)
MCHC RBC AUTO-ENTMCNC: 31.2 G/DL (ref 31.4–37.4)
MCV RBC AUTO: 93 FL (ref 82–98)
MONOCYTES # BLD AUTO: 0.33 THOUSAND/ΜL (ref 0.17–1.22)
MONOCYTES NFR BLD AUTO: 7 % (ref 4–12)
NEUTROPHILS # BLD AUTO: 2.8 THOUSANDS/ΜL (ref 1.85–7.62)
NEUTS SEG NFR BLD AUTO: 55 % (ref 43–75)
NRBC BLD AUTO-RTO: 0 /100 WBCS
PLATELET # BLD AUTO: 229 THOUSANDS/UL (ref 149–390)
PMV BLD AUTO: 9.7 FL (ref 8.9–12.7)
POTASSIUM SERPL-SCNC: 3.7 MMOL/L (ref 3.5–5.3)
PROT SERPL-MCNC: 7.9 G/DL (ref 6.4–8.2)
RBC # BLD AUTO: 4.5 MILLION/UL (ref 3.81–5.12)
SODIUM SERPL-SCNC: 138 MMOL/L (ref 136–145)
WBC # BLD AUTO: 5.02 THOUSAND/UL (ref 4.31–10.16)

## 2020-06-01 PROCEDURE — 83690 ASSAY OF LIPASE: CPT | Performed by: EMERGENCY MEDICINE

## 2020-06-01 PROCEDURE — 96361 HYDRATE IV INFUSION ADD-ON: CPT

## 2020-06-01 PROCEDURE — 99284 EMERGENCY DEPT VISIT MOD MDM: CPT

## 2020-06-01 PROCEDURE — 74177 CT ABD & PELVIS W/CONTRAST: CPT

## 2020-06-01 PROCEDURE — 96360 HYDRATION IV INFUSION INIT: CPT

## 2020-06-01 PROCEDURE — 99284 EMERGENCY DEPT VISIT MOD MDM: CPT | Performed by: EMERGENCY MEDICINE

## 2020-06-01 PROCEDURE — 80053 COMPREHEN METABOLIC PANEL: CPT | Performed by: EMERGENCY MEDICINE

## 2020-06-01 PROCEDURE — 36415 COLL VENOUS BLD VENIPUNCTURE: CPT | Performed by: EMERGENCY MEDICINE

## 2020-06-01 PROCEDURE — 85025 COMPLETE CBC W/AUTO DIFF WBC: CPT | Performed by: EMERGENCY MEDICINE

## 2020-06-01 RX ORDER — AMOXICILLIN AND CLAVULANATE POTASSIUM 875; 125 MG/1; MG/1
1 TABLET, FILM COATED ORAL ONCE
Status: COMPLETED | OUTPATIENT
Start: 2020-06-01 | End: 2020-06-01

## 2020-06-01 RX ORDER — AMOXICILLIN AND CLAVULANATE POTASSIUM 875; 125 MG/1; MG/1
1 TABLET, FILM COATED ORAL EVERY 12 HOURS SCHEDULED
Qty: 14 TABLET | Refills: 0 | Status: SHIPPED | OUTPATIENT
Start: 2020-06-01 | End: 2020-06-08

## 2020-06-01 RX ADMIN — IOHEXOL 100 ML: 350 INJECTION, SOLUTION INTRAVENOUS at 15:13

## 2020-06-01 RX ADMIN — SODIUM CHLORIDE 1000 ML: 0.9 INJECTION, SOLUTION INTRAVENOUS at 12:03

## 2020-06-01 RX ADMIN — AMOXICILLIN AND CLAVULANATE POTASSIUM 1 TABLET: 875; 125 TABLET, FILM COATED ORAL at 15:57

## 2020-07-21 ENCOUNTER — OFFICE VISIT (OUTPATIENT)
Dept: FAMILY MEDICINE CLINIC | Facility: CLINIC | Age: 65
End: 2020-07-21
Payer: COMMERCIAL

## 2020-07-21 VITALS
TEMPERATURE: 97.9 F | SYSTOLIC BLOOD PRESSURE: 130 MMHG | DIASTOLIC BLOOD PRESSURE: 82 MMHG | HEIGHT: 67 IN | RESPIRATION RATE: 16 BRPM | WEIGHT: 188 LBS | BODY MASS INDEX: 29.51 KG/M2 | OXYGEN SATURATION: 97 % | HEART RATE: 68 BPM

## 2020-07-21 DIAGNOSIS — E55.9 VITAMIN D DEFICIENCY: ICD-10-CM

## 2020-07-21 DIAGNOSIS — M25.552 LEFT HIP PAIN: ICD-10-CM

## 2020-07-21 DIAGNOSIS — F41.8 DEPRESSION WITH ANXIETY: ICD-10-CM

## 2020-07-21 DIAGNOSIS — Z11.59 NEED FOR HEPATITIS C SCREENING TEST: ICD-10-CM

## 2020-07-21 DIAGNOSIS — I10 ESSENTIAL HYPERTENSION: Primary | ICD-10-CM

## 2020-07-21 DIAGNOSIS — E03.9 ACQUIRED HYPOTHYROIDISM: ICD-10-CM

## 2020-07-21 DIAGNOSIS — E78.2 MIXED HYPERLIPIDEMIA: ICD-10-CM

## 2020-07-21 PROCEDURE — 3008F BODY MASS INDEX DOCD: CPT | Performed by: FAMILY MEDICINE

## 2020-07-21 PROCEDURE — 99214 OFFICE O/P EST MOD 30 MIN: CPT | Performed by: FAMILY MEDICINE

## 2020-07-21 PROCEDURE — 3079F DIAST BP 80-89 MM HG: CPT | Performed by: FAMILY MEDICINE

## 2020-07-21 PROCEDURE — 1160F RVW MEDS BY RX/DR IN RCRD: CPT | Performed by: FAMILY MEDICINE

## 2020-07-21 PROCEDURE — 4040F PNEUMOC VAC/ADMIN/RCVD: CPT | Performed by: FAMILY MEDICINE

## 2020-07-21 PROCEDURE — 1036F TOBACCO NON-USER: CPT | Performed by: FAMILY MEDICINE

## 2020-07-21 PROCEDURE — 3075F SYST BP GE 130 - 139MM HG: CPT | Performed by: FAMILY MEDICINE

## 2020-07-21 RX ORDER — ERGOCALCIFEROL 1.25 MG/1
50000 CAPSULE ORAL WEEKLY
Qty: 12 CAPSULE | Refills: 1 | Status: SHIPPED | OUTPATIENT
Start: 2020-07-21 | End: 2021-08-06

## 2020-07-22 NOTE — ASSESSMENT & PLAN NOTE
Will order x-ray of the left hip to rule out osteoarthritis  Patient c/o some burning sensation over anterior and lateral thigh, r/o   meralgia paresthetica  Consider starting on Gabapentin, referral to orthopedic surgeon  Recommended to try OTC Lidocaine patches

## 2020-07-22 NOTE — ASSESSMENT & PLAN NOTE
Patient has not been taking Vit D 50,000 IU weekly on a regular basis  Will check Vit D 25 hydroxy and instruct patient regarding dose she needs to take

## 2020-07-27 DIAGNOSIS — F41.9 ANXIETY DISORDER, UNSPECIFIED TYPE: ICD-10-CM

## 2020-07-27 RX ORDER — CLONAZEPAM 2 MG/1
TABLET ORAL
Qty: 30 TABLET | Refills: 5 | Status: SHIPPED | OUTPATIENT
Start: 2020-07-27 | End: 2021-02-22

## 2020-08-26 DIAGNOSIS — E03.9 ACQUIRED HYPOTHYROIDISM: ICD-10-CM

## 2020-08-26 RX ORDER — LEVOTHYROXINE SODIUM 0.05 MG/1
TABLET ORAL
Qty: 45 TABLET | Refills: 5 | Status: SHIPPED | OUTPATIENT
Start: 2020-08-26 | End: 2021-08-17 | Stop reason: ALTCHOICE

## 2020-09-20 DIAGNOSIS — E03.9 ACQUIRED HYPOTHYROIDISM: Primary | ICD-10-CM

## 2020-09-20 LAB
25(OH)D3 SERPL-MCNC: 30 NG/ML (ref 30–100)
CHOLEST SERPL-MCNC: 204 MG/DL
CHOLEST/HDLC SERPL: 4.1 (CALC)
HDLC SERPL-MCNC: 50 MG/DL
LDLC SERPL CALC-MCNC: 124 MG/DL (CALC)
NONHDLC SERPL-MCNC: 154 MG/DL (CALC)
T4 FREE SERPL-MCNC: 1 NG/DL (ref 0.8–1.8)
TRIGL SERPL-MCNC: 187 MG/DL
TSH SERPL-ACNC: 5.3 MIU/L (ref 0.4–4.5)

## 2020-09-21 ENCOUNTER — OFFICE VISIT (OUTPATIENT)
Dept: FAMILY MEDICINE CLINIC | Facility: CLINIC | Age: 65
End: 2020-09-21
Payer: COMMERCIAL

## 2020-09-21 VITALS
WEIGHT: 186.8 LBS | DIASTOLIC BLOOD PRESSURE: 68 MMHG | HEART RATE: 72 BPM | HEIGHT: 67 IN | SYSTOLIC BLOOD PRESSURE: 122 MMHG | BODY MASS INDEX: 29.32 KG/M2 | TEMPERATURE: 98 F | RESPIRATION RATE: 16 BRPM

## 2020-09-21 DIAGNOSIS — M54.31 SCIATICA OF RIGHT SIDE: Primary | ICD-10-CM

## 2020-09-21 PROCEDURE — 1036F TOBACCO NON-USER: CPT | Performed by: NURSE PRACTITIONER

## 2020-09-21 PROCEDURE — 1160F RVW MEDS BY RX/DR IN RCRD: CPT | Performed by: NURSE PRACTITIONER

## 2020-09-21 PROCEDURE — 3074F SYST BP LT 130 MM HG: CPT | Performed by: NURSE PRACTITIONER

## 2020-09-21 PROCEDURE — 3078F DIAST BP <80 MM HG: CPT | Performed by: NURSE PRACTITIONER

## 2020-09-21 PROCEDURE — 99213 OFFICE O/P EST LOW 20 MIN: CPT | Performed by: NURSE PRACTITIONER

## 2020-09-21 RX ORDER — PREDNISONE 10 MG/1
TABLET ORAL
Qty: 20 TABLET | Refills: 0 | Status: SHIPPED | OUTPATIENT
Start: 2020-09-21 | End: 2021-08-03 | Stop reason: ALTCHOICE

## 2020-09-21 NOTE — PROGRESS NOTES
Assessment/Plan:    Right sided sciatica- to start PO Prednisone taper  Side effects reviewed  Take with food, no NSAIDs while on this  May take OTC Tylenol prn, max of 3g/24 hours  Moist heat to lower back region  Stretches reviewed today  Try to avoid repetitive movements  Consider PT referral if pain persists      Diagnoses and all orders for this visit:    Sciatica of right side  -     predniSONE 10 mg tablet; Take 4 tabs for 2 days, 3 tabs for 2 days, 2 tabs for 2 days and 1 tab for 2 days    Other orders  -     Cancel: Hepatitis C Antibody ([facility]); Future  -     Cancel: HIV 1/2 Antigen/Antibody (4th Generation) w Reflex [facility]; Future  -     Cancel: TDAP VACCINE GREATER THAN OR EQUAL TO 6YO IM  -     Cancel: influenza vaccine, quadrivalent, 0 5 mL, preservative-free, for adult and pediatric patients 6 mos+ (AFLURIA, FLUARIX, FLULAVAL, FLUZONE)          Subjective:      Patient ID: Mabeline Bamberger is a 71 y o  female  HPI     Pt presents by herself today for an acute visit   C/O right lower back/ upper buttock pain which started months ago   She can't recall any specific injuries but she does work at [employer] which involves increased bending and lifting of patients   Pain radiates down her leg to about mid shin (pain radiates down buttock to lateral leg)  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   Pain is always present to a degree  Described as a deep dull pain, sharp at times  Denies numbness/ tingling  No bowel/ bladder incontinence or urinary retention    No saddle anesthesia  She has taken OTC Tylenol with no relief     Notes a herinated disc in 1991 which was surgically repaired at the time, no issues since then     The following portions of the patient's history were reviewed and updated as appropriate: allergies, current medications, past family history, past medical history, past social history, past surgical history and problem list     Review of Systems Constitutional: Negative for chills, fatigue and fever  Respiratory: Negative for cough, shortness of breath and wheezing  Cardiovascular: Negative for chest pain, palpitations and leg swelling  Gastrointestinal: Negative for abdominal pain, blood in stool, constipation, diarrhea and nausea  Genitourinary: Negative for dysuria  Musculoskeletal: Positive for back pain  Negative for gait problem and myalgias  Skin: Negative for rash and wound  Neurological: Negative for dizziness, weakness, numbness and headaches  Psychiatric/Behavioral: Negative for sleep disturbance and suicidal ideas  Objective:      /68   Pulse 72   Temp 98 °F (36 7 °C) (Oral)   Resp 16   Ht 5' 6 5" (1 689 m)   Wt 84 7 kg (186 lb 12 8 oz)   BMI 29 70 kg/m²          Physical Exam  Constitutional:       General: She is not in acute distress  Appearance: She is well-developed  She is not diaphoretic  HENT:      Head: Normocephalic and atraumatic  Eyes:      Conjunctiva/sclera: Conjunctivae normal       Pupils: Pupils are equal, round, and reactive to light  Neck:      Musculoskeletal: Normal range of motion and neck supple  Thyroid: No thyromegaly  Cardiovascular:      Rate and Rhythm: Normal rate and regular rhythm  Heart sounds: Normal heart sounds  No murmur  Pulmonary:      Effort: Pulmonary effort is normal  No respiratory distress  Breath sounds: Normal breath sounds  No wheezing  Abdominal:      General: Bowel sounds are normal  There is no distension  Palpations: Abdomen is soft  Tenderness: There is no abdominal tenderness  Musculoskeletal: Normal range of motion  Arms:    Lymphadenopathy:      Cervical: No cervical adenopathy  Skin:     General: Skin is warm and dry  Neurological:      Mental Status: She is alert and oriented to person, place, and time     Psychiatric:         Mood and Affect: Mood normal          Behavior: Behavior normal  Thought Content:  Thought content normal          Judgment: Judgment normal

## 2020-09-21 NOTE — LETTER
September 21, 2020     Patient: Miladys Ernst   YOB: 1955   Date of Visit: 9/21/2020       To Whom it May Concern:    Lyla Gitelman is under my professional care  She was seen in my office on 9/21/2020  She may return to work on 9/24/2020  If you have any questions or concerns, please don't hesitate to call           Sincerely,          PRISCILLA Hester        CC: No Recipients

## 2020-09-22 ENCOUNTER — TELEPHONE (OUTPATIENT)
Dept: FAMILY MEDICINE CLINIC | Facility: CLINIC | Age: 65
End: 2020-09-22

## 2020-09-22 NOTE — TELEPHONE ENCOUNTER
Patient would like to have her work note updated to state she will still return on 9/24/2020, but be on light duty until 10/8/2020  Please fax the letter to [facility] [id number], [address] [facility], Fax: [phone], Attention: Michelle, [address] [place] [address]

## 2020-09-23 NOTE — TELEPHONE ENCOUNTER
Patient was seen by PRISCILLA Dubois on 9/21/20 for sciatica  Please ask Aquiles Rodriguez tomorrow if she he can extend work excuse as requested by patient

## 2020-09-24 NOTE — TELEPHONE ENCOUNTER
Patient requested light duty until 10/8/2020  I did not see anything in her chart regarding light duty  Jessi Gonzalez received a form today with more information

## 2020-09-28 ENCOUNTER — TELEPHONE (OUTPATIENT)
Dept: FAMILY MEDICINE CLINIC | Facility: CLINIC | Age: 65
End: 2020-09-28

## 2020-09-28 PROBLEM — K57.92 DIVERTICULITIS: Status: ACTIVE | Noted: 2020-09-28

## 2020-09-28 NOTE — TELEPHONE ENCOUNTER
Marianne Gaines, can you please call [employer] and get some more info- I have the forms and I don't mind completing them for her  I just need clarification on what her "light duty" would entail to complete the forms    I can complete them on Thursday when I'm back in our office

## 2020-09-28 NOTE — TELEPHONE ENCOUNTER
Is requesting if the letter for work can state that the dates the she was to be at home and the dates that she needs lite duty at work       At home: 09/22/20-09/28/20  Lite duty: 09/29/20-10/13/20 (2 weeks)     Patient would like Alfred Lind to call her if possible to be able to explain quickly the situation

## 2020-09-28 NOTE — TELEPHONE ENCOUNTER
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

## 2020-09-29 NOTE — TELEPHONE ENCOUNTER
If you want to try and fax the forms to me at [facility], I can try to fill them out today    If not, I'll do it on Thursday when I'm back in that office

## 2020-10-13 DIAGNOSIS — F41.8 DEPRESSION WITH ANXIETY: ICD-10-CM

## 2020-10-14 RX ORDER — CITALOPRAM 40 MG/1
TABLET ORAL
Qty: 30 TABLET | Refills: 6 | Status: SHIPPED | OUTPATIENT
Start: 2020-10-14 | End: 2021-07-07

## 2020-10-22 ENCOUNTER — TELEPHONE (OUTPATIENT)
Dept: FAMILY MEDICINE CLINIC | Facility: CLINIC | Age: 65
End: 2020-10-22

## 2020-10-22 DIAGNOSIS — M54.50 ACUTE BILATERAL LOW BACK PAIN, UNSPECIFIED WHETHER SCIATICA PRESENT: Primary | ICD-10-CM

## 2020-10-22 RX ORDER — METHOCARBAMOL 500 MG/1
TABLET, FILM COATED ORAL
Qty: 30 TABLET | Refills: 0 | Status: SHIPPED | OUTPATIENT
Start: 2020-10-22

## 2021-01-20 DIAGNOSIS — I10 ESSENTIAL HYPERTENSION: ICD-10-CM

## 2021-01-20 RX ORDER — LOSARTAN POTASSIUM 100 MG/1
TABLET ORAL
Qty: 30 TABLET | Refills: 6 | Status: SHIPPED | OUTPATIENT
Start: 2021-01-20 | End: 2021-08-12

## 2021-02-22 ENCOUNTER — TELEMEDICINE (OUTPATIENT)
Dept: FAMILY MEDICINE CLINIC | Facility: CLINIC | Age: 66
End: 2021-02-22
Payer: COMMERCIAL

## 2021-02-22 DIAGNOSIS — E78.2 MIXED HYPERLIPIDEMIA: ICD-10-CM

## 2021-02-22 DIAGNOSIS — B34.9 ACUTE VIRAL SYNDROME: Primary | ICD-10-CM

## 2021-02-22 DIAGNOSIS — F41.9 ANXIETY DISORDER, UNSPECIFIED TYPE: ICD-10-CM

## 2021-02-22 PROCEDURE — 99213 OFFICE O/P EST LOW 20 MIN: CPT | Performed by: FAMILY MEDICINE

## 2021-02-22 PROCEDURE — 1160F RVW MEDS BY RX/DR IN RCRD: CPT | Performed by: FAMILY MEDICINE

## 2021-02-22 PROCEDURE — 1036F TOBACCO NON-USER: CPT | Performed by: FAMILY MEDICINE

## 2021-02-22 RX ORDER — ATORVASTATIN CALCIUM 40 MG/1
TABLET, FILM COATED ORAL
Qty: 30 TABLET | Refills: 6 | Status: SHIPPED | OUTPATIENT
Start: 2021-02-22 | End: 2021-10-06

## 2021-02-22 RX ORDER — CLONAZEPAM 2 MG/1
TABLET ORAL
Qty: 30 TABLET | Refills: 5 | Status: SHIPPED | OUTPATIENT
Start: 2021-02-22 | End: 2021-09-02

## 2021-02-22 NOTE — ASSESSMENT & PLAN NOTE
Recommended patient to get tested for COVID-19  Advised to increase fluid intake, take Tylenol PRN for headache, body aches  Stay home until test results available  Call office if symptoms persist or worsen

## 2021-02-22 NOTE — PROGRESS NOTES
Virtual Regular Visit      Assessment/Plan:    Problem List Items Addressed This Visit        Other    Acute viral syndrome - Primary     Recommended patient to get tested for COVID-19  Advised to increase fluid intake, take Tylenol PRN for headache, body aches  Stay home until test results available  Call office if symptoms persist or worsen  Relevant Orders    Mammo screening bilateral w cad    Novel Coronavirus (Covid-19),PCR [facility] - Collected at [facility] or [facility]          BMI Counseling: There is no height or weight on file to calculate BMI  The BMI is above normal  Nutrition recommendations include encouraging healthy choices of fruits and vegetables, decreasing fast food intake, consuming healthier snacks, limiting drinks that contain sugar, moderation in carbohydrate intake, increasing intake of lean protein, reducing intake of saturated and trans fat and reducing intake of cholesterol  Exercise recommendations include exercising 3-5 times per week  No pharmacotherapy was ordered  Reason for visit is   Chief Complaint   Patient presents with    Virtual Regular Visit     Body Aches, Chills, Headache, Dizziness        Encounter provider Filipe Solorzano MD    Provider located at [address] [place] [id number]      Recent Visits  No visits were found meeting these conditions  Showing recent visits within past 7 days and meeting all other requirements     Today's Visits  Date Type Provider Dept   02/22/21 Telemedicine Filipe Solorzano MD [address] [place] [address] today's visits and meeting all other requirements     Future Appointments  No visits were found meeting these conditions  Showing future appointments within next 150 days and meeting all other requirements        The patient was identified by name and date of birth   Megan Pineda was informed that this is a telemedicine visit and that the visit is being conducted through [employer] and patient was informed that this is not a secure, HIPAA-compliant platform  She agrees to proceed     My office door was closed  No one else was in the room  She acknowledged consent and understanding of privacy and security of the video platform  The patient has agreed to participate and understands they can discontinue the visit at any time  Patient is aware this is a billable service  Subjective  Joann Todd is a 76 y o  female      HPI     Patient presents for virtual video visit c/o headache, fever, mild nasal congestion, body aches, chills, mild nausea  Patient had fever 100 4 over the weekend  Symptoms started on Saturday, 2/21/21  Denies loss of taste or smell  No cough, no SOB  No diarrhea, no vomiting  Patient works at [employer]  Reports no known exposure to COVID-19  Patient has HTN  Blood pressure this morning was 117/83  Past Medical History:   Diagnosis Date    Anxiety     Depression     Disease of thyroid gland     hypo    Dissection of carotid artery (HCC)     Left    Diverticulitis     Herniated lumbar intervertebral disc     Hypertension     Transient cerebral ischemia     Pt with hx TIA/CVA in 1/2009  Off coumadin per vascular  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  Last assessed: 06/01/12       Past Surgical History:   Procedure Laterality Date    BACK SURGERY  1992    Lower, for herniated disc   CHOLECYSTECTOMY      FRACTURE SURGERY      Open treatment of fracture of one metacarpal bone, right 3rd metacarpal pin   [place] COLONOSCOPY FLX DX W/COLLJ SPEC WHEN PFRMD N/A 3/30/2016    Procedure: COLONOSCOPY;  Surgeon: Gee Jenkins MD;  Location: [facility];   Service: Colorectal       Current Outpatient Medications   Medication Sig Dispense Refill    aspirin 81 MG tablet Take 1 tablet by mouth daily      atorvastatin (LIPITOR) 40 mg tablet take 1 tablet by mouth once daily 30 tablet 6    citalopram (CeleXA) 40 mg tablet TAKE 1 TABLET DAILY 30 tablet 6    clonazePAM (KlonoPIN) 2 mg tablet take 1 tablet by mouth at bedtime 30 tablet 5    ergocalciferol (VITAMIN D2) 50,000 units Take 1 capsule (50,000 Units total) by mouth once a week 12 capsule 1    lamoTRIgine (LaMICtal) 200 MG tablet take 1 tablet by mouth once daily 30 tablet 6    levothyroxine 50 mcg tablet take 1 5 tab  Fri, Sat and Sunday and 1 tab  rest of the week 45 tablet 5    losartan (COZAAR) 100 MG tablet take 1 tablet by mouth once daily 30 tablet 6    methocarbamol (ROBAXIN) 500 mg tablet Take 1 tab  every 8 hr  PRN for back pain/ muscle spasms 30 tablet 0    metoprolol succinate (TOPROL-XL) 25 mg 24 hr tablet take 1 tablet by mouth twice a day 60 tablet 6    predniSONE 10 mg tablet Take 4 tabs for 2 days, 3 tabs for 2 days, 2 tabs for 2 days and 1 tab for 2 days (Patient not taking: Reported on 2/22/2021) 20 tablet 0     No current facility-administered medications for this visit  Allergies   Allergen Reactions    Bactrim [Sulfamethoxazole-Trimethoprim] Hives     Other reaction(s): "red spots"       Review of Systems   Constitutional: Positive for chills, fatigue and fever  Negative for activity change and appetite change  HENT: Positive for congestion (mild)  Negative for ear pain, hearing loss, mouth sores, nosebleeds, sore throat and trouble swallowing  Eyes: Negative  Respiratory: Negative for cough, chest tightness, shortness of breath and wheezing  Cardiovascular: Negative for chest pain, palpitations and leg swelling  Gastrointestinal: Positive for nausea  Negative for abdominal pain, diarrhea and vomiting  Blood in stool: mild  Genitourinary: Negative for difficulty urinating, dysuria and hematuria  Musculoskeletal: Positive for myalgias  Skin: Negative for rash  Neurological: Positive for headaches  Negative for dizziness  Hematological: Negative  Psychiatric/Behavioral: The patient is not nervous/anxious  Video Exam    There were no vitals filed for this visit  Physical Exam  Vitals signs and nursing note reviewed  Constitutional:       Appearance: Normal appearance  She is not toxic-appearing  HENT:      Head: Normocephalic and atraumatic  Eyes:      Conjunctiva/sclera: Conjunctivae normal       Pupils: Pupils are equal, round, and reactive to light  Neck:      Musculoskeletal: Normal range of motion and neck supple  Cardiovascular:      Comments: Denies chest pain  Pulmonary:      Effort: Pulmonary effort is normal       Breath sounds: No wheezing  Abdominal:      General: There is no distension  Palpations: Abdomen is soft  Tenderness: There is no abdominal tenderness  Musculoskeletal: Normal range of motion  General: No swelling  Right lower leg: No edema  Left lower leg: No edema  Neurological:      Mental Status: She is alert  Psychiatric:         Mood and Affect: Mood normal           I spent 15 minutes directly with the patient during this visit      VIRTUAL VISIT [address] acknowledges that she has consented to an online visit or consultation  She understands that the online visit is based solely on information provided by her, and that, in the absence of a face-to-face physical evaluation by the physician, the diagnosis she receives is both limited and provisional in terms of accuracy and completeness  This is not intended to replace a full medical face-to-face evaluation by the physician  Keiry Del Rio understands and accepts these terms

## 2021-02-23 ENCOUNTER — TELEPHONE (OUTPATIENT)
Dept: FAMILY MEDICINE CLINIC | Facility: CLINIC | Age: 66
End: 2021-02-23

## 2021-02-23 DIAGNOSIS — B34.9 ACUTE VIRAL SYNDROME: ICD-10-CM

## 2021-02-23 LAB — SARS-COV-2 RNA RESP QL NAA+PROBE: NEGATIVE

## 2021-02-23 PROCEDURE — 87635 SARS-COV-2 COVID-19 AMP PRB: CPT | Performed by: FAMILY MEDICINE

## 2021-02-23 NOTE — TELEPHONE ENCOUNTER
----- Message from Jamil Oconnell MD sent at 2/23/2021 12:20 PM EST -----  Please call patient  Test for Covid-19 is negative  Recommend to call office if continues with fever, headache, develops cough

## 2021-02-23 NOTE — TELEPHONE ENCOUNTER
Patient phoned to state the fax number/information for her results is:  [phone]  Attn: Sindhu Wan

## 2021-02-23 NOTE — TELEPHONE ENCOUNTER
Spoke with patient gave results and patient needs a copy of results sent to her work   She is going to call back to let us know where

## 2021-02-24 ENCOUNTER — TELEPHONE (OUTPATIENT)
Dept: FAMILY MEDICINE CLINIC | Facility: CLINIC | Age: 66
End: 2021-02-24

## 2021-02-24 NOTE — TELEPHONE ENCOUNTER
Pt needs a letter from PCP stating she can return to work after negative COVID test   Please fax to the following number thank you    Fax [phone]  Attn: Chi Webb

## 2021-03-10 DIAGNOSIS — F41.8 DEPRESSION WITH ANXIETY: ICD-10-CM

## 2021-03-10 RX ORDER — LAMOTRIGINE 200 MG/1
TABLET ORAL
Qty: 30 TABLET | Refills: 6 | Status: SHIPPED | OUTPATIENT
Start: 2021-03-10 | End: 2021-11-23

## 2021-03-29 DIAGNOSIS — I10 ESSENTIAL HYPERTENSION: ICD-10-CM

## 2021-03-29 RX ORDER — METOPROLOL SUCCINATE 25 MG/1
TABLET, EXTENDED RELEASE ORAL
Qty: 60 TABLET | Refills: 6 | Status: SHIPPED | OUTPATIENT
Start: 2021-03-29 | End: 2021-12-07

## 2021-04-12 DIAGNOSIS — Z23 ENCOUNTER FOR IMMUNIZATION: ICD-10-CM

## 2021-04-19 ENCOUNTER — TELEPHONE (OUTPATIENT)
Dept: FAMILY MEDICINE CLINIC | Facility: CLINIC | Age: 66
End: 2021-04-19

## 2021-04-19 DIAGNOSIS — B34.9 VIRAL INFECTION, UNSPECIFIED: ICD-10-CM

## 2021-04-19 DIAGNOSIS — B34.9 VIRAL INFECTION, UNSPECIFIED: Primary | ICD-10-CM

## 2021-04-19 PROCEDURE — U0005 INFEC AGEN DETEC AMPLI PROBE: HCPCS | Performed by: FAMILY MEDICINE

## 2021-04-19 PROCEDURE — U0003 INFECTIOUS AGENT DETECTION BY NUCLEIC ACID (DNA OR RNA); SEVERE ACUTE RESPIRATORY SYNDROME CORONAVIRUS 2 (SARS-COV-2) (CORONAVIRUS DISEASE [COVID-19]), AMPLIFIED PROBE TECHNIQUE, MAKING USE OF HIGH THROUGHPUT TECHNOLOGIES AS DESCRIBED BY CMS-2020-01-R: HCPCS | Performed by: FAMILY MEDICINE

## 2021-04-20 LAB — SARS-COV-2 RNA RESP QL NAA+PROBE: POSITIVE

## 2021-04-21 ENCOUNTER — TELEMEDICINE (OUTPATIENT)
Dept: FAMILY MEDICINE CLINIC | Facility: CLINIC | Age: 66
End: 2021-04-21
Payer: COMMERCIAL

## 2021-04-21 DIAGNOSIS — U07.1 COVID-19 VIRUS INFECTION: Primary | ICD-10-CM

## 2021-04-21 PROCEDURE — 99213 OFFICE O/P EST LOW 20 MIN: CPT | Performed by: FAMILY MEDICINE

## 2021-04-21 NOTE — ASSESSMENT & PLAN NOTE
Patient was tested positive for COVID-19 on April 19, 2021  Last Friday, 4/16/21 she developed chills, low-grade fever  On Monday, 4/19/21 her symptoms worsened  Patient started feeling tired, weak, developed loss of taste and smell  C/o nasal congestion, mild dry cough  Denies shortness of breath  Recommended to continue home isolation  Discussed monoclonal antibody treatment  Patient declined, would like to think about it  Recommended to take Tylenol PRN for fever or headache  Use nasal saline spray  Continue Vit D supplements  Start Vit C 1000 mg twice daily  Call office or go to ER if develops high fever, SOB, worsening cough  Will follow up via virtual visit on Friday, 4/23/21

## 2021-04-21 NOTE — PROGRESS NOTES
COVID-19 Outpatient Progress Note    Assessment/Plan:    Problem List Items Addressed This Visit        Other    COVID-19 virus infection - Primary     Patient was tested positive for COVID-19 on April 19, 2021  Last Friday, 4/16/21 she developed chills, low-grade fever  On Monday, 4/19/21 her symptoms worsened  Patient started feeling tired, weak, developed loss of taste and smell  C/o nasal congestion, mild dry cough  Denies shortness of breath  Recommended to continue home isolation  Discussed monoclonal antibody treatment  Patient declined, would like to think about it  Recommended to take Tylenol PRN for fever or headache  Use nasal saline spray  Continue Vit D supplements  Start Vit C 1000 mg twice daily  Call office or go to ER if develops high fever, SOB, worsening cough  Will follow up via virtual visit on Friday, 4/23/21  Disposition:     I recommended continued isolation until at least 24 hours have passed since recovery defined as resolution of fever without the use of fever-reducing medications AND improvement in COVID symptoms AND 10 days have passed since onset of symptoms (or 10 days have passed since date of first positive viral diagnostic test for asymptomatic patients)  I have spent 16 minutes directly with the patient  Greater than 50% of this time was spent in counseling/coordination of care regarding: diagnostic results, prognosis, risks and benefits of treatment options, instructions for management, patient and family education, importance of treatment compliance, risk factor reductions and impressions  Discussed with patient monoclonal antibody treatment  Patient would like to think about treatment          Encounter provider Sae Taylor MD    Provider located at [facility] [id number]    Recent Visits  Date Type Provider Dept   04/19/21 Telephone [address]    Showing recent visits within past 7 days and meeting all other requirements     Today's Visits  Date Type Provider Dept   04/21/21 Telemedicine Shannen Gilliland MD [address] today's visits and meeting all other requirements     Future Appointments  No visits were found meeting these conditions  Showing future appointments within next 150 days and meeting all other requirements      This virtual check-in was done via [employer] and patient was informed that this is not a secure, HIPAA-compliant platform  She agrees to proceed  Patient agrees to participate in a virtual check in via telephone or video visit instead of presenting to the office to address urgent/immediate medical needs  Patient is aware this is a billable service  After connecting through [facility], the patient was identified by name and date of birth  Julieta Vazquez was informed that this was a telemedicine visit and that the exam was being conducted confidentially over secure lines  My office door was closed  Julieta Vazquez acknowledged consent and understanding of privacy and security of the telemedicine visit  I informed the patient that I have reviewed her record in Epic and presented the opportunity for her to ask any questions regarding the visit today  The patient agreed to participate  Subjective:   Julieta Vazquez is a 76 y o  female who has been screened for COVID-19  Symptom change since last report: unchanged  Patient's symptoms include fever (low grade fever), fatigue, nasal congestion, anosmia, loss of taste, cough (mild dry cough) and myalgias  Patient denies chills, sore throat, shortness of breath, chest tightness, abdominal pain, nausea, vomiting, diarrhea and headaches  Sondra Colin has been staying home and has isolated themselves in her home   She is taking care to not share personal items and is cleaning all surfaces that are touched often, like counters, tabletops, and doorknobs using household cleaning sprays or wipes  She is wearing a mask when she leaves her room  Date of symptom onset: 4/16/2021  Date of positive COVID-19 PCR: 4/19/2021    Lab Results   Component Value Date    SARSCOV2 Positive (A) 04/19/2021    SARSCOV2 Not Detected 10/07/2020     Past Medical History:   Diagnosis Date    Anxiety     Depression     Disease of thyroid gland     hypo    Dissection of carotid artery (HCC)     Left    Diverticulitis     Herniated lumbar intervertebral disc     Hypertension     Transient cerebral ischemia     Pt with hx TIA/CVA in 1/2009  Off coumadin per vascular  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  Last assessed: 06/01/12     Past Surgical History:   Procedure Laterality Date    BACK SURGERY  1992    Lower, for herniated disc   CHOLECYSTECTOMY      FRACTURE SURGERY      Open treatment of fracture of one metacarpal bone, right 3rd metacarpal pin   [place] COLONOSCOPY FLX DX W/COLLJ SPEC WHEN PFRMD N/A 3/30/2016    Procedure: COLONOSCOPY;  Surgeon: Svetlana Chavez MD;  Location: [facility]; Service: Colorectal     Current Outpatient Medications   Medication Sig Dispense Refill    aspirin 81 MG tablet Take 1 tablet by mouth daily      atorvastatin (LIPITOR) 40 mg tablet take 1 tablet by mouth once daily 30 tablet 6    citalopram (CeleXA) 40 mg tablet TAKE 1 TABLET DAILY 30 tablet 6    clonazePAM (KlonoPIN) 2 mg tablet take 1 tablet by mouth at bedtime 30 tablet 5    ergocalciferol (VITAMIN D2) 50,000 units Take 1 capsule (50,000 Units total) by mouth once a week 12 capsule 1    lamoTRIgine (LaMICtal) 200 MG tablet take 1 tablet by mouth once daily 30 tablet 6    levothyroxine 50 mcg tablet take 1 5 tab  Fri, Sat and Sunday and 1 tab  rest of the week 45 tablet 5    losartan (COZAAR) 100 MG tablet take 1 tablet by mouth once daily 30 tablet 6    methocarbamol (ROBAXIN) 500 mg tablet Take 1 tab  every 8 hr   PRN for back pain/ muscle spasms 30 tablet 0    metoprolol succinate (TOPROL-XL) 25 mg 24 hr tablet take 1 tablet by mouth twice a day 60 tablet 6    predniSONE 10 mg tablet Take 4 tabs for 2 days, 3 tabs for 2 days, 2 tabs for 2 days and 1 tab for 2 days (Patient not taking: Reported on 2/22/2021) 20 tablet 0     No current facility-administered medications for this visit  Allergies   Allergen Reactions    Bactrim [Sulfamethoxazole-Trimethoprim] Hives     Other reaction(s): "red spots"       Review of Systems   Constitutional: Positive for fatigue and fever (low grade fever)  Negative for activity change and chills  HENT: Positive for congestion  Negative for sore throat  Eyes: Negative  Respiratory: Positive for cough (mild dry cough)  Negative for chest tightness and shortness of breath  Cardiovascular: Negative for chest pain, palpitations and leg swelling  Gastrointestinal: Negative for abdominal pain, diarrhea, nausea and vomiting  Genitourinary: Negative for difficulty urinating, flank pain, frequency and hematuria  Musculoskeletal: Positive for myalgias  Negative for arthralgias  Neurological: Negative for dizziness and headaches  Hematological: Negative  Psychiatric/Behavioral: The patient is not nervous/anxious  Objective: There were no vitals filed for this visit  Physical Exam  Constitutional:       Appearance: Normal appearance  HENT:      Head: Normocephalic and atraumatic  Nose: Congestion present  Eyes:      Conjunctiva/sclera: Conjunctivae normal       Pupils: Pupils are equal, round, and reactive to light  Neck:      Musculoskeletal: Normal range of motion and neck supple  Cardiovascular:      Comments: Denies chest pain  Pulmonary:      Effort: Pulmonary effort is normal       Breath sounds: No wheezing  Abdominal:      General: There is no distension  Palpations: Abdomen is soft  Tenderness: There is no abdominal tenderness  Musculoskeletal: Normal range of motion  General: No swelling or tenderness  Skin:     Findings: No rash  Neurological:      Mental Status: She is alert  Psychiatric:         Mood and Affect: Mood normal        VIRTUAL VISIT DISCLAIMER    Edinson Emil acknowledges that she has consented to an online visit or consultation  She understands that the online visit is based solely on information provided by her, and that, in the absence of a face-to-face physical evaluation by the physician, the diagnosis she receives is both limited and provisional in terms of accuracy and completeness  This is not intended to replace a full medical face-to-face evaluation by the physician  Edinson Stein understands and accepts these terms

## 2021-04-23 ENCOUNTER — TELEMEDICINE (OUTPATIENT)
Dept: FAMILY MEDICINE CLINIC | Facility: CLINIC | Age: 66
End: 2021-04-23
Payer: COMMERCIAL

## 2021-04-23 ENCOUNTER — ANESTHESIA (OUTPATIENT)
Dept: ANESTHESIOLOGY | Facility: HOSPITAL | Age: 66
End: 2021-04-23

## 2021-04-23 ENCOUNTER — ANESTHESIA EVENT (OUTPATIENT)
Dept: ANESTHESIOLOGY | Facility: HOSPITAL | Age: 66
End: 2021-04-23

## 2021-04-23 DIAGNOSIS — U07.1 COVID-19 VIRUS INFECTION: Primary | ICD-10-CM

## 2021-04-23 PROCEDURE — 99213 OFFICE O/P EST LOW 20 MIN: CPT | Performed by: FAMILY MEDICINE

## 2021-04-23 NOTE — ASSESSMENT & PLAN NOTE
Patient was tested positive for COVID-19 on 4/19/21  Reports improvement in symptoms  Less tired  Nasal congestion improved  No fever or chills  Had mild headache yesterday  Reports no cough, no shortness of breath  Still c/o loss of taste and smell  Recommended to continue home isolation  Patient declined monoclonal antibody infusion  Recommended to stay well hydrated, follow a well-balanced diet  Take Tylenol PRN  Continue vit D, Vit C  Call office if symptoms persist or worsen over the weekend  Will follow- up via virtual visit next week

## 2021-04-23 NOTE — PROGRESS NOTES
COVID-19 Outpatient Progress Note    Assessment/Plan:    Problem List Items Addressed This Visit        Other    COVID-19 virus infection - Primary     Patient was tested positive for COVID-19 on 4/19/21  Reports improvement in symptoms  Less tired  Nasal congestion improved  No fever or chills  Had mild headache yesterday  Reports no cough, no shortness of breath  Still c/o loss of taste and smell  Recommended to continue home isolation  Patient declined monoclonal antibody infusion  Recommended to stay well hydrated, follow a well-balanced diet  Take Tylenol PRN  Continue vit D, Vit C  Call office if symptoms persist or worsen over the weekend  Will follow- up via virtual visit next week  Disposition:     I recommended continued isolation until at least 24 hours have passed since recovery defined as resolution of fever without the use of fever-reducing medications AND improvement in COVID symptoms AND 10 days have passed since onset of symptoms (or 10 days have passed since date of first positive viral diagnostic test for asymptomatic patients)  I have spent 12 minutes directly with the patient  Greater than 50% of this time was spent in counseling/coordination of care regarding: diagnostic results, prognosis, risks and benefits of treatment options, instructions for management, patient and family education, importance of treatment compliance, risk factor reductions and impressions   Patient declined monoclonal antibody infusion       Encounter provider Shannen Gilliland MD    Provider located at [address] [place] [address]  [address] [facility] [address] [place] [id number]    Recent Visits  Date Type Provider Dept   04/21/21 [address], [facility]   04/19/21 Telephone [facility]   Showing recent visits within past 7 days and meeting all other requirements     Today's Visits  Date Type Provider Dept   04/23/21 Telemedicine Devon Taylor 14 today's visits and meeting all other requirements     Future Appointments  No visits were found meeting these conditions  Showing future appointments within next 150 days and meeting all other requirements      This virtual check-in was done via [employer] and patient was informed that this is not a secure, HIPAA-compliant platform  She agrees to proceed  Patient agrees to participate in a virtual check in via telephone or video visit instead of presenting to the office to address urgent/immediate medical needs  Patient is aware this is a billable service  After connecting through [place], the patient was identified by name and date of birth  Ofe Olvera was informed that this was a telemedicine visit and that the exam was being conducted confidentially over secure lines  My office door was closed  Ofe Olvera acknowledged consent and understanding of privacy and security of the telemedicine visit  I informed the patient that I have reviewed her record in Epic and presented the opportunity for her to ask any questions regarding the visit today  The patient agreed to participate  Subjective:   Ofe Olvera is a 76 y o  female who has been screened for COVID-19  Patient's symptoms include fatigue (improved), nasal congestion (mild), anosmia, loss of taste and headache (improved)  Patient denies fever, chills, sore throat, cough, shortness of breath, chest tightness, abdominal pain, nausea, vomiting, diarrhea and myalgias       Date of symptom onset: 4/16/2021  Date of positive COVID-19 PCR: 4/19/2021    Lab Results   Component Value Date    SARSCOV2 Positive (A) 04/19/2021    SARSCOV2 Not Detected 10/07/2020     Past Medical History:   Diagnosis Date    Anxiety     Depression     Disease of thyroid gland     hypo    Dissection of carotid artery (HCC)     Left    Diverticulitis     Herniated lumbar intervertebral disc     Hypertension     Transient cerebral ischemia     Pt with hx TIA/CVA in 1/2009  Off coumadin per vascular  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  Last assessed: 06/01/12     Past Surgical History:   Procedure Laterality Date    BACK SURGERY  1992    Lower, for herniated disc   CHOLECYSTECTOMY      FRACTURE SURGERY      Open treatment of fracture of one metacarpal bone, right 3rd metacarpal pin   VA COLONOSCOPY FLX DX W/COLLJ SPEC WHEN PFRMD N/A 3/30/2016    Procedure: COLONOSCOPY;  Surgeon: Saul Vivas MD;  Location: [facility]; Service: Colorectal     Current Outpatient Medications   Medication Sig Dispense Refill    aspirin 81 MG tablet Take 1 tablet by mouth daily      atorvastatin (LIPITOR) 40 mg tablet take 1 tablet by mouth once daily 30 tablet 6    citalopram (CeleXA) 40 mg tablet TAKE 1 TABLET DAILY 30 tablet 6    clonazePAM (KlonoPIN) 2 mg tablet take 1 tablet by mouth at bedtime 30 tablet 5    ergocalciferol (VITAMIN D2) 50,000 units Take 1 capsule (50,000 Units total) by mouth once a week 12 capsule 1    lamoTRIgine (LaMICtal) 200 MG tablet take 1 tablet by mouth once daily 30 tablet 6    levothyroxine 50 mcg tablet take 1 5 tab  Fri, Sat and Sunday and 1 tab  rest of the week 45 tablet 5    losartan (COZAAR) 100 MG tablet take 1 tablet by mouth once daily 30 tablet 6    methocarbamol (ROBAXIN) 500 mg tablet Take 1 tab  every 8 hr  PRN for back pain/ muscle spasms 30 tablet 0    metoprolol succinate (TOPROL-XL) 25 mg 24 hr tablet take 1 tablet by mouth twice a day 60 tablet 6    predniSONE 10 mg tablet Take 4 tabs for 2 days, 3 tabs for 2 days, 2 tabs for 2 days and 1 tab for 2 days (Patient not taking: Reported on 2/22/2021) 20 tablet 0     No current facility-administered medications for this visit        Allergies   Allergen Reactions    Bactrim [Sulfamethoxazole-Trimethoprim] Hives     Other reaction(s): "red spots" Review of Systems   Constitutional: Positive for fatigue (improved)  Negative for activity change, appetite change, chills and fever  HENT: Positive for congestion (mild)  Negative for ear pain and sore throat  Eyes: Negative  Respiratory: Negative for cough, chest tightness and shortness of breath  Cardiovascular: Negative for chest pain, palpitations and leg swelling  Gastrointestinal: Negative for abdominal pain, diarrhea, nausea and vomiting  Genitourinary: Negative  Musculoskeletal: Negative for arthralgias and myalgias  Neurological: Positive for headaches (improved)  Negative for dizziness  Hematological: Negative  Psychiatric/Behavioral: Negative for sleep disturbance  The patient is not nervous/anxious  Objective: There were no vitals filed for this visit  Physical Exam  Vitals signs and nursing note reviewed  Constitutional:       Appearance: Normal appearance  She is not toxic-appearing  HENT:      Head: Normocephalic and atraumatic  Nose: Congestion (mild) present  Eyes:      Conjunctiva/sclera: Conjunctivae normal       Pupils: Pupils are equal, round, and reactive to light  Neck:      Musculoskeletal: Normal range of motion and neck supple  Cardiovascular:      Comments: Denies chest pain  Pulmonary:      Effort: Pulmonary effort is normal       Breath sounds: No wheezing  Abdominal:      General: There is no distension  Palpations: Abdomen is soft  Tenderness: There is no abdominal tenderness  Musculoskeletal:         General: No swelling or tenderness  Skin:     Findings: No rash  Neurological:      Mental Status: She is alert  Psychiatric:         Mood and Affect: Mood normal        VIRTUAL VISIT DISCLAIMER    Asyadonnydo Burgess acknowledges that she has consented to an online visit or consultation   She understands that the online visit is based solely on information provided by her, and that, in the absence of a face-to-face physical evaluation by the physician, the diagnosis she receives is both limited and provisional in terms of accuracy and completeness  This is not intended to replace a full medical face-to-face evaluation by the physician  Miguel Urbina understands and accepts these terms

## 2021-04-27 ENCOUNTER — TELEMEDICINE (OUTPATIENT)
Dept: FAMILY MEDICINE CLINIC | Facility: CLINIC | Age: 66
End: 2021-04-27
Payer: COMMERCIAL

## 2021-04-27 DIAGNOSIS — U07.1 COVID-19 VIRUS INFECTION: Primary | ICD-10-CM

## 2021-04-27 PROCEDURE — 99213 OFFICE O/P EST LOW 20 MIN: CPT | Performed by: FAMILY MEDICINE

## 2021-04-27 NOTE — PROGRESS NOTES
COVID-19 Outpatient Progress Note    Assessment/Plan:    Problem List Items Addressed This Visit        Other    COVID-19 virus infection - Primary     Patient tested positive for COVID-19 on 4/19/21  Last day at work 4/19/21  Patient states that she feels better, less tired  Nasal congestion improved  No fever, chills  Still reports loss of taste and smell  Denies cough, shortness of breath  Recommended to continue home isolation  Encouraged to stay well hydrated, follow a well-balanced diet  Continue Vit D, Vit C  Will follow-up via virtual visit on Friday, 4/30/21  Disposition:     I recommended continued isolation until at least 24 hours have passed since recovery defined as resolution of fever without the use of fever-reducing medications AND improvement in COVID symptoms AND 10 days have passed since onset of symptoms (or 10 days have passed since date of first positive viral diagnostic test for asymptomatic patients)  I have spent 12 minutes directly with the patient  Greater than 50% of this time was spent in counseling/coordination of care regarding: diagnostic results, prognosis, risks and benefits of treatment options, instructions for management, patient and family education, importance of treatment compliance, risk factor reductions and impressions          Encounter provider Ra Cuba MD    Provider located at [address] [place] [address]  [address] [id number]    Recent Visits  Date Type Provider Dept   04/23/21 [address] [place] [address], [address] [place] [address]   04/21/21 Telemedicine Devon Espana 14 recent visits within past 7 days and meeting all other requirements     Today's Visits  Date Type Provider Dept   04/27/21 Telemedicine MD Devon Espana 14 today's visits and meeting all other requirements     Future Appointments  No visits were found meeting these conditions  Showing future appointments within next 150 days and meeting all other requirements      This virtual check-in was done via [employer] and patient was informed that this is not a secure, HIPAA-compliant platform  She agrees to proceed  Patient agrees to participate in a virtual check in via telephone or video visit instead of presenting to the office to address urgent/immediate medical needs  Patient is aware this is a billable service  After connecting through [facility], the patient was identified by name and date of birth  Miladys Ernst was informed that this was a telemedicine visit and that the exam was being conducted confidentially over secure lines  My office door was closed  Miladys Ernst acknowledged consent and understanding of privacy and security of the telemedicine visit  I informed the patient that I have reviewed her record in Epic and presented the opportunity for her to ask any questions regarding the visit today  The patient agreed to participate  Subjective:   Miladys Ernst is a 76 y o  female who has been screened for COVID-19  Symptom change since last report: improving  Patient's symptoms include fatigue (improved), nasal congestion (mild), anosmia, loss of taste and headache (improved)  Patient denies fever, chills, rhinorrhea, sore throat, cough, shortness of breath, chest tightness, abdominal pain, nausea, vomiting, diarrhea and myalgias  Mayda Hiens has been staying home and has isolated themselves in her home  She is taking care to not share personal items and is cleaning all surfaces that are touched often, like counters, tabletops, and doorknobs using household cleaning sprays or wipes       Date of symptom onset: 4/16/2021  Date of positive COVID-19 PCR: 4/19/2021    Lab Results   Component Value Date    SARSCOV2 Positive (A) 04/19/2021    SARSCOV2 Not Detected 10/07/2020     Past Medical History: Diagnosis Date    Anxiety     Depression     Disease of thyroid gland     hypo    Dissection of carotid artery (HCC)     Left    Diverticulitis     Herniated lumbar intervertebral disc     Hypertension     Transient cerebral ischemia     Pt with hx TIA/CVA in 1/2009  Off coumadin per vascular  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  Last assessed: 06/01/12     Past Surgical History:   Procedure Laterality Date    BACK SURGERY  1992    Lower, for herniated disc   CHOLECYSTECTOMY      FRACTURE SURGERY      Open treatment of fracture of one metacarpal bone, right 3rd metacarpal pin   IN COLONOSCOPY FLX DX W/COLLJ SPEC WHEN PFRMD N/A 3/30/2016    Procedure: COLONOSCOPY;  Surgeon: Cari Henley MD;  [facility]: [facility]; Service: Colorectal     Current Outpatient Medications   Medication Sig Dispense Refill    aspirin 81 MG tablet Take 1 tablet by mouth daily      atorvastatin (LIPITOR) 40 mg tablet take 1 tablet by mouth once daily 30 tablet 6    citalopram (CeleXA) 40 mg tablet TAKE 1 TABLET DAILY 30 tablet 6    clonazePAM (KlonoPIN) 2 mg tablet take 1 tablet by mouth at bedtime 30 tablet 5    ergocalciferol (VITAMIN D2) 50,000 units Take 1 capsule (50,000 Units total) by mouth once a week 12 capsule 1    lamoTRIgine (LaMICtal) 200 MG tablet take 1 tablet by mouth once daily 30 tablet 6    levothyroxine 50 mcg tablet take 1 5 tab  Fri, Sat and Sunday and 1 tab  rest of the week 45 tablet 5    losartan (COZAAR) 100 MG tablet take 1 tablet by mouth once daily 30 tablet 6    methocarbamol (ROBAXIN) 500 mg tablet Take 1 tab  every 8 hr   PRN for back pain/ muscle spasms 30 tablet 0    metoprolol succinate (TOPROL-XL) 25 mg 24 hr tablet take 1 tablet by mouth twice a day 60 tablet 6    predniSONE 10 mg tablet Take 4 tabs for 2 days, 3 tabs for 2 days, 2 tabs for 2 days and 1 tab for 2 days (Patient not taking: Reported on 2/22/2021) 20 tablet 0     No current facility-administered medications for this visit  Allergies   Allergen Reactions    Bactrim [Sulfamethoxazole-Trimethoprim] Hives     Other reaction(s): "red spots"       Review of Systems   Constitutional: Positive for fatigue (improved)  Negative for activity change, appetite change, chills and fever  HENT: Positive for congestion (mild)  Negative for ear pain, rhinorrhea, sore throat and trouble swallowing  Eyes: Negative  Respiratory: Negative for cough, chest tightness and shortness of breath  Cardiovascular: Negative for chest pain, palpitations and leg swelling  Gastrointestinal: Negative for abdominal pain, diarrhea, nausea and vomiting  Genitourinary: Negative for difficulty urinating, dysuria, flank pain, frequency and hematuria  Musculoskeletal: Negative for arthralgias, myalgias and neck pain  Neurological: Positive for headaches (improved)  Negative for dizziness  Hematological: Negative  Psychiatric/Behavioral: Negative for dysphoric mood and sleep disturbance  The patient is not nervous/anxious  Objective: There were no vitals filed for this visit  Physical Exam  Vitals signs and nursing note reviewed  Constitutional:       Appearance: Normal appearance  She is not toxic-appearing  HENT:      Head: Normocephalic and atraumatic  Nose: Congestion (mild) present  Eyes:      Conjunctiva/sclera: Conjunctivae normal       Pupils: Pupils are equal, round, and reactive to light  Neck:      Musculoskeletal: Normal range of motion and neck supple  Cardiovascular:      Comments: Denies chest pain  Pulmonary:      Effort: Pulmonary effort is normal       Breath sounds: No wheezing  Abdominal:      General: There is no distension  Palpations: Abdomen is soft  Tenderness: There is no abdominal tenderness  Musculoskeletal: Normal range of motion  General: No swelling or tenderness  Skin:     Findings: No rash  Neurological:      Mental Status: She is alert  Psychiatric:         Mood and Affect: Mood normal        VIRTUAL VISIT DISCLAIMER    Kelsey Nance acknowledges that she has consented to an online visit or consultation  She understands that the online visit is based solely on information provided by her, and that, in the absence of a face-to-face physical evaluation by the physician, the diagnosis she receives is both limited and provisional in terms of accuracy and completeness  This is not intended to replace a full medical face-to-face evaluation by the physician  Kelsey Nance understands and accepts these terms

## 2021-04-27 NOTE — ASSESSMENT & PLAN NOTE
Patient tested positive for COVID-19 on 4/19/21  Last day at work 4/19/21  Patient states that she feels better, less tired  Nasal congestion improved  No fever, chills  Still reports loss of taste and smell  Denies cough, shortness of breath  Recommended to continue home isolation  Encouraged to stay well hydrated, follow a well-balanced diet  Continue Vit D, Vit C  Will follow-up via virtual visit on Friday, 4/30/21

## 2021-04-30 ENCOUNTER — TELEMEDICINE (OUTPATIENT)
Dept: FAMILY MEDICINE CLINIC | Facility: CLINIC | Age: 66
End: 2021-04-30
Payer: COMMERCIAL

## 2021-04-30 DIAGNOSIS — U07.1 COVID-19 VIRUS INFECTION: Primary | ICD-10-CM

## 2021-04-30 PROCEDURE — 99213 OFFICE O/P EST LOW 20 MIN: CPT | Performed by: FAMILY MEDICINE

## 2021-04-30 PROCEDURE — 3288F FALL RISK ASSESSMENT DOCD: CPT | Performed by: FAMILY MEDICINE

## 2021-04-30 PROCEDURE — 1036F TOBACCO NON-USER: CPT | Performed by: FAMILY MEDICINE

## 2021-04-30 PROCEDURE — 1101F PT FALLS ASSESS-DOCD LE1/YR: CPT | Performed by: FAMILY MEDICINE

## 2021-04-30 PROCEDURE — 1160F RVW MEDS BY RX/DR IN RCRD: CPT | Performed by: FAMILY MEDICINE

## 2021-04-30 NOTE — ASSESSMENT & PLAN NOTE
Patient reports improvement in symptoms  Denies fever, chills, headache  Still feels tired  She was tested positive for COVID-19 on April 19, 2021  Last date work was 4/19/21  Reports no nasal congestion  Slowly regaining sense of taste and smell  Denies cough, shortness of breath  Patient takes vitamins, tried to eat healthy  Recommended to continue home isolation till Monday, May 3, 2021  Will discuss returning back to work next week  Will follow up via virtual visit on 5/3/21

## 2021-04-30 NOTE — PROGRESS NOTES
COVID-19 Outpatient Progress Note    Assessment/Plan:    Problem List Items Addressed This Visit        Other    COVID-19 virus infection - Primary     Patient reports improvement in symptoms  Denies fever, chills, headache  Still feels tired  She was tested positive for COVID-19 on April 19, 2021  Last date work was 4/19/21  Reports no nasal congestion  Slowly regaining sense of taste and smell  Denies cough, shortness of breath  Patient takes vitamins, tried to eat healthy  Recommended to continue home isolation till Monday, May 3, 2021  Will discuss returning back to work next week  Will follow up via virtual visit on 5/3/21  Disposition:     I recommended continued isolation until at least 24 hours have passed since recovery defined as resolution of fever without the use of fever-reducing medications AND improvement in COVID symptoms AND 10 days have passed since onset of symptoms (or 10 days have passed since date of first positive viral diagnostic test for asymptomatic patients)  I have spent 15 minutes directly with the patient  Greater than 50% of this time was spent in counseling/coordination of care regarding: risks and benefits of treatment options, instructions for management, patient and family education, importance of treatment compliance, risk factor reductions and impressions          Encounter provider Uma Rivera MD    Provider located at [facility] [id number]    Recent Visits  Date Type Provider Dept   04/27/21 Telemedicine Uma Rivera MD 0 [facility]   04/23/21 Telemedicine Devon Mensah 14 recent visits within past 7 days and meeting all other requirements     Today's Visits  Date Type Provider Dept   04/30/21 Telemedicine MD Devon Mensah 14 today's visits and meeting all other requirements     Future Appointments  No visits were found meeting these conditions  Showing future appointments within next 150 days and meeting all other requirements      This virtual check-in was done via [employer] and patient was informed that this is not a secure, HIPAA-compliant platform  She agrees to proceed  Patient agrees to participate in a virtual check in via telephone or video visit instead of presenting to the office to address urgent/immediate medical needs  Patient is aware this is a billable service  After connecting through [facility], the patient was identified by name and date of birth  Surinder Helm was informed that this was a telemedicine visit and that the exam was being conducted confidentially over secure lines  Surinder Helm acknowledged consent and understanding of privacy and security of the telemedicine visit  I informed the patient that I have reviewed her record in Epic and presented the opportunity for her to ask any questions regarding the visit today  The patient agreed to participate  Subjective:   Surinder Helm is a 76 y o  female who has been screened for COVID-19  Symptom change since last report: improving  Patient's symptoms include fatigue, anosmia and loss of taste  Patient denies fever, chills, congestion, rhinorrhea, sore throat, cough, shortness of breath, chest tightness, abdominal pain, nausea, vomiting, diarrhea, myalgias and headaches  Lenny Ibrahim has been staying home and has isolated themselves in her home  She is taking care to not share personal items and is cleaning all surfaces that are touched often, like counters, tabletops, and doorknobs using household cleaning sprays or wipes       Date of symptom onset: 4/16/2021  Date of positive COVID-19 PCR: 4/19/2021    Lab Results   Component Value Date    SARSCOV2 Positive (A) 04/19/2021    SARSCOV2 Not Detected 10/07/2020     Past Medical History:   Diagnosis Date    Anxiety     Depression     Disease of thyroid gland     hypo    Dissection of carotid artery (HCC)     Left    Diverticulitis     Herniated lumbar intervertebral disc     Hypertension     Transient cerebral ischemia     Pt with hx TIA/CVA in 1/2009  Off coumadin per vascular  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  Last assessed: 06/01/12     Past Surgical History:   Procedure Laterality Date    BACK SURGERY  1992    Lower, for herniated disc   CHOLECYSTECTOMY      FRACTURE SURGERY      Open treatment of fracture of one metacarpal bone, right 3rd metacarpal pin   [place] COLONOSCOPY FLX DX W/COLLJ SPEC WHEN PFRMD N/A 3/30/2016    Procedure: COLONOSCOPY;  Surgeon: Essie Pinto MD;  [facility]: [facility]; Service: Colorectal     Current Outpatient Medications   Medication Sig Dispense Refill    aspirin 81 MG tablet Take 1 tablet by mouth daily      atorvastatin (LIPITOR) 40 mg tablet take 1 tablet by mouth once daily 30 tablet 6    citalopram (CeleXA) 40 mg tablet TAKE 1 TABLET DAILY 30 tablet 6    clonazePAM (KlonoPIN) 2 mg tablet take 1 tablet by mouth at bedtime 30 tablet 5    ergocalciferol (VITAMIN D2) 50,000 units Take 1 capsule (50,000 Units total) by mouth once a week 12 capsule 1    lamoTRIgine (LaMICtal) 200 MG tablet take 1 tablet by mouth once daily 30 tablet 6    levothyroxine 50 mcg tablet take 1 5 tab  Fri, Sat and Sunday and 1 tab  rest of the week 45 tablet 5    losartan (COZAAR) 100 MG tablet take 1 tablet by mouth once daily 30 tablet 6    methocarbamol (ROBAXIN) 500 mg tablet Take 1 tab  every 8 hr  PRN for back pain/ muscle spasms 30 tablet 0    metoprolol succinate (TOPROL-XL) 25 mg 24 hr tablet take 1 tablet by mouth twice a day 60 tablet 6    predniSONE 10 mg tablet Take 4 tabs for 2 days, 3 tabs for 2 days, 2 tabs for 2 days and 1 tab for 2 days (Patient not taking: Reported on 2/22/2021) 20 tablet 0     No current facility-administered medications for this visit  Allergies   Allergen Reactions    Bactrim [Sulfamethoxazole-Trimethoprim] Hives     Other reaction(s): "red spots"       Review of Systems   Constitutional: Positive for fatigue  Negative for activity change, appetite change, chills and fever  HENT: Negative for congestion, rhinorrhea and sore throat  Eyes: Negative  Respiratory: Negative for cough, chest tightness and shortness of breath  Cardiovascular: Negative for chest pain, palpitations and leg swelling  Gastrointestinal: Negative for abdominal pain, diarrhea, nausea and vomiting  Musculoskeletal: Negative for arthralgias, back pain, joint swelling, myalgias and neck pain  Skin: Negative for rash  Neurological: Negative for dizziness and headaches  Hematological: Negative  Psychiatric/Behavioral: Negative for sleep disturbance  The patient is not nervous/anxious  Objective: There were no vitals filed for this visit  Physical Exam  Vitals signs and nursing note reviewed  Constitutional:       Appearance: Normal appearance  She is not toxic-appearing  HENT:      Head: Normocephalic and atraumatic  Nose: No congestion  Eyes:      Conjunctiva/sclera: Conjunctivae normal       Pupils: Pupils are equal, round, and reactive to light  Neck:      Musculoskeletal: Normal range of motion and neck supple  Cardiovascular:      Comments: No chest pain  Pulmonary:      Effort: Pulmonary effort is normal       Breath sounds: No wheezing  Abdominal:      General: There is no distension  Palpations: Abdomen is soft  Tenderness: There is no abdominal tenderness  Musculoskeletal:         General: No swelling or tenderness  Skin:     Findings: No rash  Neurological:      Mental Status: She is alert  Psychiatric:         Mood and Affect: Mood normal        VIRTUAL VISIT DISCLAIMER    Dale Latham acknowledges that she has consented to an online visit or consultation   She understands that the online visit is based solely on information provided by her, and that, in the absence of a face-to-face physical evaluation by the physician, the diagnosis she receives is both limited and provisional in terms of accuracy and completeness  This is not intended to replace a full medical face-to-face evaluation by the physician  Colton Simons understands and accepts these terms

## 2021-05-03 ENCOUNTER — TELEMEDICINE (OUTPATIENT)
Dept: FAMILY MEDICINE CLINIC | Facility: CLINIC | Age: 66
End: 2021-05-03
Payer: COMMERCIAL

## 2021-05-03 DIAGNOSIS — U07.1 COVID-19 VIRUS INFECTION: Primary | ICD-10-CM

## 2021-05-03 PROCEDURE — 1036F TOBACCO NON-USER: CPT | Performed by: FAMILY MEDICINE

## 2021-05-03 PROCEDURE — 99213 OFFICE O/P EST LOW 20 MIN: CPT | Performed by: FAMILY MEDICINE

## 2021-05-03 NOTE — ASSESSMENT & PLAN NOTE
Patient was tested positive for COVID-19 on 4/19/21  Reports improvement in symptoms  Denies headache, fever  Fatigue improved  Slowly regaining sense of taste and smell  No shortness of breath  Patient would like to return back to work this week  Recommended to stay well hydrated, continue to follow a well-balanced diet, take multivitamins  Will release back to work on May 6, 2021  Recommended to call office with any questions or concerns

## 2021-05-03 NOTE — PROGRESS NOTES
COVID-19 Outpatient Progress Note    Assessment/Plan:    Problem List Items Addressed This Visit        Other    COVID-19 virus infection - Primary     Patient was tested positive for COVID-19 on 4/19/21  Reports improvement in symptoms  Denies headache, fever  Fatigue improved  Slowly regaining sense of taste and smell  No shortness of breath  Patient would like to return back to work this week  Recommended to stay well hydrated, continue to follow a well-balanced diet, take multivitamins  Will release back to work on May 6, 2021  Recommended to call office with any questions or concerns  Disposition:     I recommended continued isolation until at least 24 hours have passed since recovery defined as resolution of fever without the use of fever-reducing medications AND improvement in COVID symptoms AND 10 days have passed since onset of symptoms (or 10 days have passed since date of first positive viral diagnostic test for asymptomatic patients)  I have spent 15 minutes directly with the patient  Greater than 50% of this time was spent in counseling/coordination of care regarding: diagnostic results, prognosis, risks and benefits of treatment options, instructions for management, patient and family education, importance of treatment compliance, risk factor reductions and impressions          Encounter provider Lavell Tyler MD    Provider located at [place] [id number]    Recent Visits  Date Type Provider Dept   04/30/21 Telemedicine Lavell Tyler  [facility]   04/27/21 Telemedicine Devon Childs 14 recent visits within past 7 days and meeting all other requirements     Today's Visits  Date Type Provider Dept   05/03/21 Telemedicine MD Devon Childs 14 today's visits and meeting all other requirements Future Appointments  No visits were found meeting these conditions  Showing future appointments within next 150 days and meeting all other requirements      This virtual check-in was done via [employer] and patient was informed that this is a secure, HIPAA-compliant platform  She agrees to proceed  Patient agrees to participate in a virtual check in via telephone or video visit instead of presenting to the office to address urgent/immediate medical needs  Patient is aware this is a billable service  After connecting through [facility], the patient was identified by name and date of birth  Dany Severin was informed that this was a telemedicine visit and that the exam was being conducted confidentially over secure lines  Dany Severin acknowledged consent and understanding of privacy and security of the telemedicine visit  I informed the patient that I have reviewed her record in Epic and presented the opportunity for her to ask any questions regarding the visit today  The patient agreed to participate  Subjective:   Dany Severin is a 76 y o  female who has been screened for COVID-19  Symptom change since last report: improving  Patient's symptoms include fatigue (improved), anosmia (improving), loss of taste (improving) and cough (occasional dry cough)  Patient denies fever, chills, congestion, rhinorrhea, sore throat, shortness of breath, chest tightness, abdominal pain, nausea, vomiting, diarrhea, myalgias and headaches  Dipika Dawson has been staying home and has isolated themselves in her home  She is taking care to not share personal items and is cleaning all surfaces that are touched often, like counters, tabletops, and doorknobs using household cleaning sprays or wipes       Date of symptom onset: 4/16/2021  Date of positive COVID-19 PCR: 4/19/2021    Lab Results   Component Value Date    SARSCOV2 Positive (A) 04/19/2021    SARSCOV2 Not Detected 10/07/2020     Past Medical History:   Diagnosis Date    Anxiety     Depression     Disease of thyroid gland     hypo    Dissection of carotid artery (HCC)     Left    Diverticulitis     Herniated lumbar intervertebral disc     Hypertension     Transient cerebral ischemia     Pt with hx TIA/CVA in 1/2009  Off coumadin per vascular  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  Last assessed: 06/01/12     Past Surgical History:   Procedure Laterality Date    BACK SURGERY  1992    Lower, for herniated disc   CHOLECYSTECTOMY      FRACTURE SURGERY      Open treatment of fracture of one metacarpal bone, right 3rd metacarpal pin   [place] COLONOSCOPY FLX DX W/COLLJ SPEC WHEN PFRMD N/A 3/30/2016    Procedure: COLONOSCOPY;  Surgeon: Danielito Rg MD;  Location: [facility]; Service: Colorectal     Current Outpatient Medications   Medication Sig Dispense Refill    aspirin 81 MG tablet Take 1 tablet by mouth daily      atorvastatin (LIPITOR) 40 mg tablet take 1 tablet by mouth once daily 30 tablet 6    citalopram (CeleXA) 40 mg tablet TAKE 1 TABLET DAILY 30 tablet 6    clonazePAM (KlonoPIN) 2 mg tablet take 1 tablet by mouth at bedtime 30 tablet 5    ergocalciferol (VITAMIN D2) 50,000 units Take 1 capsule (50,000 Units total) by mouth once a week 12 capsule 1    lamoTRIgine (LaMICtal) 200 MG tablet take 1 tablet by mouth once daily 30 tablet 6    levothyroxine 50 mcg tablet take 1 5 tab  Fri, Sat and Sunday and 1 tab  rest of the week 45 tablet 5    losartan (COZAAR) 100 MG tablet take 1 tablet by mouth once daily 30 tablet 6    methocarbamol (ROBAXIN) 500 mg tablet Take 1 tab  every 8 hr   PRN for back pain/ muscle spasms 30 tablet 0    metoprolol succinate (TOPROL-XL) 25 mg 24 hr tablet take 1 tablet by mouth twice a day 60 tablet 6    predniSONE 10 mg tablet Take 4 tabs for 2 days, 3 tabs for 2 days, 2 tabs for 2 days and 1 tab for 2 days (Patient not taking: Reported on 2/22/2021) 20 tablet 0     No current facility-administered medications for this visit  Allergies   Allergen Reactions    Bactrim [Sulfamethoxazole-Trimethoprim] Hives     Other reaction(s): "red spots"       Review of Systems   Constitutional: Positive for fatigue (improved)  Negative for chills and fever  HENT: Negative for congestion, ear pain, rhinorrhea and sore throat  Eyes: Negative  Respiratory: Positive for cough (occasional dry cough)  Negative for chest tightness and shortness of breath  Cardiovascular: Negative for chest pain, palpitations and leg swelling  Gastrointestinal: Negative for abdominal pain, diarrhea, nausea and vomiting  Musculoskeletal: Negative for arthralgias, back pain, joint swelling, myalgias and neck pain  Neurological: Negative for dizziness and headaches  Objective: There were no vitals filed for this visit  Physical Exam  Constitutional:       Appearance: Normal appearance  She is not ill-appearing  HENT:      Nose: No congestion  Eyes:      Conjunctiva/sclera: Conjunctivae normal       Pupils: Pupils are equal, round, and reactive to light  Cardiovascular:      Comments: Denies chest pain  Pulmonary:      Effort: Pulmonary effort is normal       Breath sounds: No wheezing  Musculoskeletal: Normal range of motion  General: No swelling or tenderness  Skin:     Findings: No rash  Neurological:      Mental Status: She is alert  Psychiatric:         Mood and Affect: Mood normal        VIRTUAL VISIT DISCLAIMER    Miladys Ernst acknowledges that she has consented to an online visit or consultation  She understands that the online visit is based solely on information provided by her, and that, in the absence of a face-to-face physical evaluation by the physician, the diagnosis she receives is both limited and provisional in terms of accuracy and completeness  This is not intended to replace a full medical face-to-face evaluation by the physician   Miladys Ernst understands and accepts these terms

## 2021-05-05 ENCOUNTER — TELEPHONE (OUTPATIENT)
Dept: FAMILY MEDICINE CLINIC | Facility: CLINIC | Age: 66
End: 2021-05-05

## 2021-05-05 NOTE — LETTER
May 6, 2021    Patient: Ofe Olvera  YOB: 1955  Date of Last Encounter: 5/3/2021      To whom it may concern:     Ofe Olvera has tested positive for COVID-19 (Coronavirus)  She may return to work on Monday, 5/10/2021, which is 10 days from illness onset (provided symptoms are improving) and 24 hours without fever      Sincerely,         NANDINI Taylor

## 2021-05-05 NOTE — TELEPHONE ENCOUNTER
Pt states that she is still feeling weak she would like to extend her work excuse until 05/10/21   Please advised

## 2021-05-06 NOTE — TELEPHONE ENCOUNTER
Please provide patient with work note  May return to work on 5/10/ 2021  Recommend to call office if continues with fatigue

## 2021-05-07 ENCOUNTER — HOSPITAL ENCOUNTER (OUTPATIENT)
Dept: GASTROENTEROLOGY | Facility: AMBULARY SURGERY CENTER | Age: 66
Setting detail: OUTPATIENT SURGERY
Discharge: HOME | End: 2021-05-07
Attending: COLON & RECTAL SURGERY

## 2021-06-08 ENCOUNTER — VBI (OUTPATIENT)
Dept: ADMINISTRATIVE | Facility: OTHER | Age: 66
End: 2021-06-08

## 2021-06-17 ENCOUNTER — TELEPHONE (OUTPATIENT)
Dept: GASTROENTEROLOGY | Facility: AMBULARY SURGERY CENTER | Age: 66
End: 2021-06-17

## 2021-06-18 ENCOUNTER — ANESTHESIA (OUTPATIENT)
Dept: GASTROENTEROLOGY | Facility: AMBULARY SURGERY CENTER | Age: 66
End: 2021-06-18

## 2021-06-18 ENCOUNTER — HOSPITAL ENCOUNTER (OUTPATIENT)
Dept: GASTROENTEROLOGY | Facility: AMBULARY SURGERY CENTER | Age: 66
Setting detail: OUTPATIENT SURGERY
Discharge: HOME | End: 2021-06-18
Attending: COLON & RECTAL SURGERY | Admitting: COLON & RECTAL SURGERY
Payer: COMMERCIAL

## 2021-06-18 ENCOUNTER — ANESTHESIA EVENT (OUTPATIENT)
Dept: GASTROENTEROLOGY | Facility: AMBULARY SURGERY CENTER | Age: 66
End: 2021-06-18

## 2021-06-18 VITALS
DIASTOLIC BLOOD PRESSURE: 87 MMHG | RESPIRATION RATE: 16 BRPM | OXYGEN SATURATION: 97 % | WEIGHT: 188 LBS | HEIGHT: 66 IN | SYSTOLIC BLOOD PRESSURE: 158 MMHG | HEART RATE: 63 BPM | TEMPERATURE: 97.8 F | BODY MASS INDEX: 30.22 KG/M2

## 2021-06-18 DIAGNOSIS — Z12.11 COLON CANCER SCREENING: ICD-10-CM

## 2021-06-18 PROCEDURE — 99213 OFFICE O/P EST LOW 20 MIN: CPT | Performed by: COLON & RECTAL SURGERY

## 2021-06-18 PROCEDURE — G0121 COLON CA SCRN NOT HI RSK IND: HCPCS | Performed by: COLON & RECTAL SURGERY

## 2021-06-18 RX ORDER — LIDOCAINE HYDROCHLORIDE 10 MG/ML
INJECTION, SOLUTION EPIDURAL; INFILTRATION; INTRACAUDAL; PERINEURAL AS NEEDED
Status: DISCONTINUED | OUTPATIENT
Start: 2021-06-18 | End: 2021-06-18

## 2021-06-18 RX ORDER — SODIUM CHLORIDE, SODIUM LACTATE, POTASSIUM CHLORIDE, CALCIUM CHLORIDE 600; 310; 30; 20 MG/100ML; MG/100ML; MG/100ML; MG/100ML
INJECTION, SOLUTION INTRAVENOUS CONTINUOUS PRN
Status: DISCONTINUED | OUTPATIENT
Start: 2021-06-18 | End: 2021-06-18

## 2021-06-18 RX ORDER — PROPOFOL 10 MG/ML
INJECTION, EMULSION INTRAVENOUS AS NEEDED
Status: DISCONTINUED | OUTPATIENT
Start: 2021-06-18 | End: 2021-06-18

## 2021-06-18 RX ADMIN — LIDOCAINE HYDROCHLORIDE 50 MG: 10 INJECTION, SOLUTION EPIDURAL; INFILTRATION; INTRACAUDAL at 10:08

## 2021-06-18 RX ADMIN — PROPOFOL 60 MG: 10 INJECTION, EMULSION INTRAVENOUS at 10:09

## 2021-06-18 RX ADMIN — PROPOFOL 30 MG: 10 INJECTION, EMULSION INTRAVENOUS at 10:15

## 2021-06-18 RX ADMIN — PROPOFOL 30 MG: 10 INJECTION, EMULSION INTRAVENOUS at 10:14

## 2021-06-18 RX ADMIN — PROPOFOL 50 MG: 10 INJECTION, EMULSION INTRAVENOUS at 10:08

## 2021-06-18 RX ADMIN — SODIUM CHLORIDE, SODIUM LACTATE, POTASSIUM CHLORIDE, AND CALCIUM CHLORIDE: .6; .31; .03; .02 INJECTION, SOLUTION INTRAVENOUS at 09:23

## 2021-06-18 RX ADMIN — PROPOFOL 60 MG: 10 INJECTION, EMULSION INTRAVENOUS at 10:10

## 2021-06-18 RX ADMIN — PROPOFOL 30 MG: 10 INJECTION, EMULSION INTRAVENOUS at 10:12

## 2021-06-18 RX ADMIN — PROPOFOL 30 MG: 10 INJECTION, EMULSION INTRAVENOUS at 10:11

## 2021-06-18 RX ADMIN — PROPOFOL 30 MG: 10 INJECTION, EMULSION INTRAVENOUS at 10:16

## 2021-06-18 RX ADMIN — PROPOFOL 30 MG: 10 INJECTION, EMULSION INTRAVENOUS at 10:17

## 2021-06-18 NOTE — H&P
History and Physical   Colon and Rectal Surgery   Katja Lora 76 y o  female MRN: [id number]  Unit/Bed#:  Encounter: [id number]  06/18/21   10:01 AM      CC:  History of polyps  History of Present Illness   HPI:  Katja Lora is a 76 y o  female with no GI symptoms  Historical Information   Past Medical History:   Diagnosis Date    Anxiety     Depression     Disease of thyroid gland     hypo    Dissection of carotid artery (HCC)     Left    Diverticulitis     Herniated lumbar intervertebral disc     Hypertension     Stroke ([facility] [address] )     Transient cerebral ischemia     Pt with hx TIA/CVA in 1/2009  Off coumadin per vascular  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  Last assessed: 06/01/12     Past Surgical History:   Procedure Laterality Date    BACK SURGERY  1992    Lower, for herniated disc   CHOLECYSTECTOMY      COLONOSCOPY      FRACTURE SURGERY      Open treatment of fracture of one metacarpal bone, right 3rd metacarpal pin   [place] COLONOSCOPY FLX DX W/COLLJ SPEC WHEN PFRMD N/A 3/30/2016    Procedure: COLONOSCOPY;  Surgeon: Denisse Aoyn MD;  Location: [facility]; Service: Colorectal       Meds/Allergies     (Not in a hospital admission)        Current Outpatient Medications:     atorvastatin (LIPITOR) 40 mg tablet, take 1 tablet by mouth once daily, Disp: 30 tablet, Rfl: 6    citalopram (CeleXA) 40 mg tablet, TAKE 1 TABLET DAILY, Disp: 30 tablet, Rfl: 6    clonazePAM (KlonoPIN) 2 mg tablet, take 1 tablet by mouth at bedtime, Disp: 30 tablet, Rfl: 5    ergocalciferol (VITAMIN D2) 50,000 units, Take 1 capsule (50,000 Units total) by mouth once a week, Disp: 12 capsule, Rfl: 1    lamoTRIgine (LaMICtal) 200 MG tablet, take 1 tablet by mouth once daily, Disp: 30 tablet, Rfl: 6    levothyroxine 50 mcg tablet, take 1 5 tab  Fri, Sat and Sunday and 1 tab   rest of the week, Disp: 45 tablet, Rfl: 5    losartan (COZAAR) 100 MG tablet, take 1 tablet by mouth once daily, Disp: 30 tablet, Rfl: 6    methocarbamol (ROBAXIN) 500 mg tablet, Take 1 tab  every 8 hr  PRN for back pain/ muscle spasms, Disp: 30 tablet, Rfl: 0    metoprolol succinate (TOPROL-XL) 25 mg 24 hr tablet, take 1 tablet by mouth twice a day, Disp: 60 tablet, Rfl: 6    predniSONE 10 mg tablet, Take 4 tabs for 2 days, 3 tabs for 2 days, 2 tabs for 2 days and 1 tab for 2 days (Patient not taking: Reported on 2/22/2021), Disp: 20 tablet, Rfl: 0  No current facility-administered medications for this encounter  Facility-Administered Medications Ordered in Other Encounters:     lactated ringers infusion, , Intravenous, Continuous PRN, Mariela Maynard CRNA, New Bag at 06/18/21 [id number]    Allergies   Allergen Reactions    Bactrim [Sulfamethoxazole-Trimethoprim] Hives     Other reaction(s): "red spots"         Social History   Social History     Substance and Sexual Activity   Alcohol Use Yes    Comment: Social     Social History     Substance and Sexual Activity   Drug Use Never     Social History     Tobacco Use   Smoking Status Never Smoker   Smokeless Tobacco Never Used         Family History:   Family History   Problem Relation Age of Onset    Diabetes Mother     Depression Father     Coronary artery disease Father     Diabetes Daughter     Cancer Sister      Review of Systems - General ROS: negative  Respiratory ROS: negative  Cardiovascular ROS: negative     Objective     Current Vitals:   Blood Pressure: 154/87 (06/18/21 0916)  Pulse: 69 (06/18/21 0916)  Temperature: 97 9 °F (36 6 °C) (06/18/21 0916)  Temp Source: Temporal (06/18/21 0916)  Respirations: 18 (06/18/21 0916)  Height: 5' 6" (167 6 cm) (06/18/21 0916)  Weight - Scale: 85 3 kg (188 lb) (06/18/21 0916)  SpO2: 97 % (06/18/21 0916)  No intake or output data in the 24 hours ending 06/18/21 1001    Physical Exam:  General:  Well nourished, no distress  Neuro: Alert and oriented  Eyes:Sclera anicteric, conjunctiva pink    Pulm: Clear to auscultation bilaterally  No respiratory Distress  CV:  Regular rate and rhythm  No murmurs  Abdomen:  Soft, flat, non-tender, without masses or hepatosplenomegaly  Lab Results:       ASSESSMENT:  Edinson Stein is a 76 y o  female for screening  PLAN:  Colonoscopy  Risks , including, but not limited to, bleeding, perforation, missed lesions, and potential need for surgery, were reviewed  Alternatives to colonoscopy were discussed    Young Anne MD

## 2021-06-18 NOTE — DISCHARGE INSTRUCTIONS
Diverticulosis   WHAT YOU NEED TO KNOW:   Diverticulosis is a condition that causes small pockets called diverticula to form in your intestine  These pockets make it difficult for bowel movements to pass through your digestive system  DISCHARGE INSTRUCTIONS:   Seek care immediately if:   · You have severe pain on the left side of your lower abdomen  · Your bowel movements are bright or dark red  Contact your healthcare provider if:   · You have a fever and chills  · You feel dizzy or lightheaded  · You have nausea, or you are vomiting  · You have a change in your bowel movements  · You have questions or concerns about your condition or care  Medicines:   · Medicines  to soften your bowel movements may be given  You may also need medicines to treat symptoms such as bloating and pain  · Take your medicine as directed  Contact your healthcare provider if you think your medicine is not helping or if you have side effects  Tell him or her if you are allergic to any medicine  Keep a list of the medicines, vitamins, and herbs you take  Include the amounts, and when and why you take them  Bring the list or the pill bottles to follow-up visits  Carry your medicine list with you in case of an emergency  Self-care: The goal of treatment is to manage any symptoms you have and prevent other problems such as diverticulitis  Diverticulitis is swelling or infection of the diverticula  Your healthcare provider may recommend any of the following:  · Eat a variety of high-fiber foods  High-fiber foods help you have regular bowel movements  High-fiber foods include cooked beans, fruits, vegetables, and some cereals  Most adults need 25 to 35 grams of fiber each day  Your healthcare provider may recommend that you have more  Ask your healthcare provider how much fiber you need  Increase fiber slowly  You may have abdominal discomfort, bloating, and gas if you add fiber to your diet too quickly  You may need to take a fiber supplement if you are not getting enough fiber from food  · Drink liquids as directed  You may need to drink 2 to 3 liters (8 to 12 cups) of liquids every day  Ask your healthcare provider how much liquid to drink each day and which liquids are best for you  · Apply heat  on your abdomen for 20 to 30 minutes every 2 hours for as many days as directed  Heat helps decrease pain and muscle spasms  Help prevent diverticulitis or other symptoms: The following may help decrease your risk for diverticulitis or symptoms, such as bleeding  Talk to your provider about these or other things you can do to prevent problems that may occur with diverticulosis  · Exercise regularly  Ask your healthcare provider about the best exercise plan for you  Exercise can help you have regular bowel movements  Get 30 minutes of exercise on most days of the week  · Maintain a healthy weight  Ask your healthcare provider how much you should weigh  Ask him or her to help you create a weight loss plan if you are overweight  · Do not smoke  Nicotine and other chemicals in cigarettes increase your risk for diverticulitis  Ask your healthcare provider for information if you currently smoke and need help to quit  E-cigarettes or smokeless tobacco still contain nicotine  Talk to your healthcare provider before you use these products  · Ask your healthcare provider if it is safe to take NSAIDs  NSAIDs may increase your risk of diverticulitis  Follow up with your healthcare provider as directed:  Write down your questions so you remember to ask them during your visits  © Copyright [address] Information is for End User's use only and may not be sold, redistributed or otherwise used for commercial purposes  All illustrations and images included in CareNotes® are the copyrighted property of [employer]  or [facility] Thania [place]   The above information is an  only   It is not intended as medical advice for individual conditions or treatments  Talk to your doctor, nurse or pharmacist before following any medical regimen to see if it is safe and effective for you  Diverticulosis Diet   WHAT YOU NEED TO KNOW:   What is a diverticulosis diet? A diverticulosis diet includes high-fiber foods  High-fiber foods help you have regular bowel movements  Extra fiber may decrease your risk of forming new diverticula (small pockets) in your intestine  A high-fiber diet may also help prevent diverticulitis  Diverticulitis is a painful condition that occurs when diverticula become inflamed or infected  You do not need to avoid nuts, seeds, corn, or popcorn while you are on a diverticulosis diet  How much fiber do I need? You may need 25 to 35 grams of fiber each day  Ask your dietitian or healthcare provider how much fiber you should have  Increase your intake of fiber slowly  When you eat more fiber, you may have gas and feel bloated  You may need to take a fiber supplement if you do not get enough fiber from food  Drink plenty of liquids as you increase the fiber in your diet  Your dietitian or healthcare provider may recommend 8 eight-ounce cups or more each day  Ask which liquids are best for you  Which foods are high in fiber? · Foods with at least 4 grams of fiber per serving:      ? ? to ½ cup of high-fiber cereal (check the nutrition label on the box)    ? ½ cup of blackberries or raspberries    ? 4 dried prunes    ? 1 cooked artichoke    ? ½ cup of cooked legumes, such as lentils, or red, kidney, and louis beans    · Foods with 1 to 3 grams of fiber per serving:      ? 1 slice of whole-wheat, pumpernickel, or rye bread    ? 4 whole-wheat crackers    ? ½ cup of cereal with 1 to 3 grams of fiber per serving (check the nutrition label on the box)    ? 1 piece of fruit, such as an apple, banana, pear, kiwi, or orange    ? 3 dates    ?  ½ cup of canned apricots, fruit cocktail, peaches, or pears    ? ½ cup of raw or cooked vegetables, such as carrots, cauliflower, cabbage, spinach, squash, or corn    When should I contact my healthcare provider? · You have questions about a high-fiber diet  · You have a change in your bowel movements  · You have an upset stomach  · You have a fever  · You have pain in your lower abdomen on the left side  · You have questions about your condition or care  CARE AGREEMENT:   You have the right to help plan your care  Learn about your health condition and how it may be treated  Discuss treatment options with your healthcare providers to decide what care you want to receive  You always have the right to refuse treatment  The above information is an  only  It is not intended as medical advice for individual conditions or treatments  Talk to your doctor, nurse or pharmacist before following any medical regimen to see if it is safe and effective for you  © Copyright [address] Information is for End User's use only and may not be sold, redistributed or otherwise used for commercial purposes  All illustrations and images included in CareNotes® are the copyrighted property of [employer]  or [address] [place] [address] Rosa Elena           Colonoscopy   WHAT YOU NEED TO KNOW:   A colonoscopy is a procedure to examine the inside of your colon (intestine) with a scope  Polyps or tissue growths may have been removed during your colonoscopy  It is normal to feel bloated and to have some abdominal discomfort  You should be passing gas  If you have hemorrhoids or you had polyps removed, you may have a small amount of bleeding  DISCHARGE INSTRUCTIONS:   Call your doctor if:   · You have a large amount of bright red blood in your bowel movements  · Your abdomen is hard and firm and you have severe pain  · You have sudden trouble breathing  · You develop a rash or hives  · You have a fever within 24 hours of your procedure      · You have not had a bowel movement for 3 days after your procedure  · You have questions or concerns about your condition or care  After your colonoscopy:   · Do not lift, strain, or run  for 3 days  · Rest as much as possible  You have been given medicine to relax you  Do not  drive or make important decisions for at least 24 hours  Return to your normal activity as directed  · Relieve gas and discomfort from bloating  by lying on your left side with a heating pad on your abdomen  You may need to take short walks to help the gas move out  Eat small meals until bloating is relieved  If you had polyps removed: For 7 days after your procedure:  · Do not  take aspirin  · Do not  go on long car rides  Help prevent constipation:   · Eat a variety of healthy foods  Healthy foods include fruit, vegetables, whole-grain breads, low-fat dairy products, beans, lean meat, and fish  Ask if you need to be on a special diet  Your healthcare provider may recommend that you eat high-fiber foods such as cooked beans  Fiber helps you have regular bowel movements  · Drink liquids as directed  Adults should drink between 9 and 13 eight-ounce cups of liquid every day  Ask what amount is best for you  For most people, good liquids to drink are water, juice, and milk  · Exercise as directed  Talk to your healthcare provider about the best exercise plan for you  Exercise can help prevent constipation, decrease your blood pressure and improve your health  Follow up with your healthcare provider as directed:  Write down your questions so you remember to ask them during your visits  © Copyright [address] Information is for End User's use only and may not be sold, redistributed or otherwise used for commercial purposes  All illustrations and images included in CareNotes® are the copyrighted property of [employer]  or [facility] Thania [place]   The above information is an  only   It is not intended as medical advice for individual conditions or treatments  Talk to your doctor, nurse or pharmacist before following any medical regimen to see if it is safe and effective for you

## 2021-06-18 NOTE — ANESTHESIA PREPROCEDURE EVALUATION
Procedure:  COLONOSCOPY    Relevant Problems   CARDIO   (+) HTN (hypertension)   (+) Mixed hyperlipidemia      ENDO   (+) Hypothyroidism      NEURO/PSYCH   (+) Anxiety disorder   (+) Depression with anxiety      Recent labs personally reviewed:  Lab Results   Component Value Date    WBC 5 02 06/01/2020    HGB 13 0 06/01/2020     06/01/2020     Lab Results   Component Value Date     01/26/2017    K 3 7 06/01/2020    BUN 19 06/01/2020    CREATININE 1 08 06/01/2020    GLUCOSE 89 10/12/2015     No results found for: PTT   No results found for: INR    No results found for: HGBA1C    Physical Exam    Airway    Mallampati score: II  TM Distance: >3 FB  Neck ROM: full     Dental       Cardiovascular  Rhythm: regular, Rate: normal,     Pulmonary  Breath sounds clear to auscultation,     Other Findings        Anesthesia Plan  ASA Score- 2     Anesthesia Type- IV sedation with anesthesia with ASA Monitors  Additional Monitors:   Airway Plan:           Plan Factors-Exercise tolerance (METS): >4 METS  Chart reviewed  Existing labs reviewed  Patient summary reviewed  Induction- intravenous  Postoperative Plan-     Informed Consent- Anesthetic plan and risks discussed with patient  I personally reviewed this patient with the CRNA  Discussed and agreed on the Anesthesia Plan with the CRNA  Lalo Osorio

## 2021-06-18 NOTE — ANESTHESIA POSTPROCEDURE EVALUATION
Post-Op Assessment Note    CV Status:  Stable  Pain Score: 0    Pain management: adequate     Mental Status:  Alert and awake   Hydration Status:  Euvolemic   PONV Controlled:  Controlled   Airway Patency:  Patent      Post Op Vitals Reviewed: Yes      Staff: CRNA         No complications documented      BP   150/78   Temp   97 8   Pulse  70   Resp   14   SpO2   99%

## 2021-07-07 DIAGNOSIS — F41.8 DEPRESSION WITH ANXIETY: ICD-10-CM

## 2021-07-07 RX ORDER — CITALOPRAM 40 MG/1
TABLET ORAL
Qty: 30 TABLET | Refills: 6 | Status: SHIPPED | OUTPATIENT
Start: 2021-07-07 | End: 2022-03-15

## 2021-08-03 ENCOUNTER — OFFICE VISIT (OUTPATIENT)
Dept: FAMILY MEDICINE CLINIC | Facility: CLINIC | Age: 66
End: 2021-08-03
Payer: COMMERCIAL

## 2021-08-03 VITALS
RESPIRATION RATE: 16 BRPM | DIASTOLIC BLOOD PRESSURE: 90 MMHG | HEART RATE: 72 BPM | BODY MASS INDEX: 30.86 KG/M2 | WEIGHT: 192 LBS | TEMPERATURE: 99 F | SYSTOLIC BLOOD PRESSURE: 140 MMHG | HEIGHT: 66 IN | OXYGEN SATURATION: 98 %

## 2021-08-03 DIAGNOSIS — U09.9 POST-COVID-19 SYNDROME: Primary | ICD-10-CM

## 2021-08-03 DIAGNOSIS — M79.10 MYALGIA: ICD-10-CM

## 2021-08-03 DIAGNOSIS — E03.9 ACQUIRED HYPOTHYROIDISM: ICD-10-CM

## 2021-08-03 DIAGNOSIS — E78.2 MIXED HYPERLIPIDEMIA: ICD-10-CM

## 2021-08-03 DIAGNOSIS — F33.2 SEVERE EPISODE OF RECURRENT MAJOR DEPRESSIVE DISORDER, WITHOUT PSYCHOTIC FEATURES (HCC): ICD-10-CM

## 2021-08-03 DIAGNOSIS — L65.9 HAIR LOSS: ICD-10-CM

## 2021-08-03 DIAGNOSIS — E55.9 VITAMIN D DEFICIENCY: ICD-10-CM

## 2021-08-03 DIAGNOSIS — I10 ESSENTIAL HYPERTENSION: ICD-10-CM

## 2021-08-03 DIAGNOSIS — F41.1 GENERALIZED ANXIETY DISORDER: ICD-10-CM

## 2021-08-03 DIAGNOSIS — M25.50 ARTHRALGIA OF MULTIPLE SITES: ICD-10-CM

## 2021-08-03 DIAGNOSIS — R41.3 MEMORY PROBLEM: ICD-10-CM

## 2021-08-03 PROBLEM — G93.3 POSTVIRAL SYNDROME: Status: ACTIVE | Noted: 2021-08-03

## 2021-08-03 PROBLEM — G93.31 POSTVIRAL SYNDROME: Status: ACTIVE | Noted: 2021-08-03

## 2021-08-03 PROBLEM — B34.9 ACUTE VIRAL SYNDROME: Status: RESOLVED | Noted: 2021-02-22 | Resolved: 2021-08-03

## 2021-08-03 PROBLEM — F33.9 EPISODE OF RECURRENT MAJOR DEPRESSIVE DISORDER (HCC): Status: ACTIVE | Noted: 2021-08-03

## 2021-08-03 PROCEDURE — 3725F SCREEN DEPRESSION PERFORMED: CPT | Performed by: FAMILY MEDICINE

## 2021-08-03 PROCEDURE — 1036F TOBACCO NON-USER: CPT | Performed by: FAMILY MEDICINE

## 2021-08-03 PROCEDURE — 1160F RVW MEDS BY RX/DR IN RCRD: CPT | Performed by: FAMILY MEDICINE

## 2021-08-03 PROCEDURE — 99215 OFFICE O/P EST HI 40 MIN: CPT | Performed by: FAMILY MEDICINE

## 2021-08-03 PROCEDURE — 3008F BODY MASS INDEX DOCD: CPT | Performed by: FAMILY MEDICINE

## 2021-08-03 RX ORDER — AMLODIPINE BESYLATE 5 MG/1
5 TABLET ORAL DAILY
Qty: 30 TABLET | Refills: 5 | Status: SHIPPED | OUTPATIENT
Start: 2021-08-03 | End: 2022-03-15

## 2021-08-03 RX ORDER — BIOTIN 5 MG
TABLET ORAL
Qty: 30 TABLET | Refills: 5 | Status: SHIPPED | OUTPATIENT
Start: 2021-08-03

## 2021-08-03 NOTE — PROGRESS NOTES
Chief Complaint   Patient presents with    Post Covid issues     hair loss, and depression      Health Maintenance   Topic Date Due    Hepatitis C Screening  Never done    COVID-19 Vaccine (1) Never done    DTaP,Tdap,and Td Vaccines (1 - Tdap) 09/25/1975    Breast Cancer Screening: Mammogram  08/07/2019    Annual Physical  02/11/2021    Pneumococcal Vaccine: 65+ Years (1 of 1 - PPSV23) 03/08/2021    Influenza Vaccine (1) 09/01/2021    BMI: Followup Plan  02/22/2022    Fall Risk  04/30/2022    BMI: Adult  08/03/2022    Depression Remission PHQ  08/03/2022    Colorectal Cancer Screening  06/16/2031    HIB Vaccine  Aged Out    Hepatitis B Vaccine  Aged Out    IPV Vaccine  Aged Out    Hepatitis A Vaccine  Aged Out    Meningococcal ACWY Vaccine  Aged Out    HPV Vaccine  Aged Out       Assessment/Plan:    Post-COVID-19 syndrome  Patient had COVID-19 virus infection in April this year  Recommended to stay well hydrated, follow a well-balanced diet  [facility]  Start taking magnesium supplements  Will order blood work  Memory problem  Patient developed memory problems, decreased concentration after COVID-19 virus infection in April this year  Encouraged to stay physically, mentally and socially active  Will refer to PT  Consider neurology evaluation  Severe episode of recurrent major depressive disorder, without psychotic features ([facility] [address] )  Patient c/o worsening depression  Currently taking Citalopram 40 mg daily, Lamictal 200 mg daily, Klonopin 2 mg at bedtime  Family history is positive for Bipolar disorder in her father  Recommended to schedule appointment with Christi Turk LCSW to start psychotherapy, facilitate referral to psychiatry to discuss medical therapy  Provided emotional support to patient  HTN (hypertension)  Patient reports elevated blood pressure readings at home in 150's/ , headaches  Start Amlodipine 5 mg daily  Continue Losartan 100 mg daily, Metoprolol ER 25 mg twice daily  Follow a low-sodium diet  Continue to monitor blood pressure at home  Generalized anxiety disorder  Recommended to start psychotherapy  Arthralgia of multiple sites  Will order rheumatological blood work  Consider referral to rheumatologist       Myalgia  Will check labs  Start Magnesium 400 mg daily  Take Tylenol PRN  Vitamin D deficiency  Continue Vit D 50, 000 IU one caps  Weekly  Check Vit D 25 OH  Hair loss  Check TSH, Iron panel  Start Biotin 5 mg daily  Mixed hyperlipidemia  Continue Atorvastatin 40 mg daily  Check CMP, lipid panel  Hypothyroidism  Continue Levothyroxine current dose  Check TSH  Schedule follow-up visit in 2 weeks, will review test results  Diagnoses and all orders for this visit:    Post-COVID-19 syndrome  -     Ambulatory referral to Physical Therapy; Future    Memory problem  -     Ambulatory referral to Physical Therapy; Future    Severe episode of recurrent major depressive disorder, without psychotic features ([facility] )  -     Ambulatory referral to behavioral health therapists; Future  -     Ambulatory referral to Psychiatry; Future    Generalized anxiety disorder  -     Ambulatory referral to behavioral health therapists; Future  -     Ambulatory referral to Psychiatry; Future    Essential hypertension  -     Comprehensive metabolic panel; Future  -     Comprehensive metabolic panel  -     amLODIPine (NORVASC) 5 mg tablet; Take 1 tablet (5 mg total) by mouth daily    Arthralgia of multiple sites  -     CBC and differential; Future  -     Comprehensive metabolic panel; Future  -     Sedimentation rate, automated; Future  -     C-reactive protein; Future  -     RF Screen w/ Reflex to Titer; Future  -     TRISTAN Screen w/ Reflex to Titer/Pattern;  Future  -     CBC and differential  -     Comprehensive metabolic panel  -     Sedimentation rate, automated  -     C-reactive protein  -     TRISTAN Screen w/ Reflex to Titer/Pattern  -     Ambulatory referral to Physical Therapy; Future    Myalgia  -     CBC and differential; Future  -     Comprehensive metabolic panel; Future  -     Sedimentation rate, automated; Future  -     C-reactive protein; Future  -     RF Screen w/ Reflex to Titer; Future  -     TRISTAN Screen w/ Reflex to Titer/Pattern; Future  -     CBC and differential  -     Comprehensive metabolic panel  -     Sedimentation rate, automated  -     C-reactive protein  -     TRISTAN Screen w/ Reflex to Titer/Pattern  -     Ambulatory referral to Physical Therapy; Future  -     Magnesium 400 MG CAPS; Take 1 caps  daily    Vitamin D deficiency  -     Vitamin D 25 hydroxy; Future  -     Vitamin D 25 hydroxy    Hair loss  -     TSH, 3rd generation with Free T4 reflex; Future  -     Iron Panel (Includes Ferritin, Iron Sat%, Iron, and TIBC); Future  -     TSH, 3rd generation with Free T4 reflex  -     Biotin 5 MG TABS; Take 1 tab  daily    Mixed hyperlipidemia  -     Lipid panel; Future  -     Lipid panel    Acquired hypothyroidism          Subjective:      Patient ID: Federico Benavidez is a 76 y o  female  HPI     Patient is 59-year-old female with PMHx pf HTN, Hyperlipidemia, Obesity,  Hypothyroidism, Depression, Anxiety, Vit D deficiency, S/P Covid-19 virus infection in 4/2021  She presents today c/o feeling stressed out, depressed, crying, feeling overwhelmed due to increased stress at work ( works at [employer])    Currently taking Celexa 40 mg daily and Lamictal 200 mg daily, Klonopin 2 mg at bedtime  Patient was seen in the past by psychiatrist     Family history is positive for Bipolar disorder in her father  C/o generalized body aches, fatigue, memory problems, decreased concentration, hair loss since COVID -  19 virus infection in April this year  C/o elevated BP readings at home in 150' /,  headaches      Patient takes Losartan 100 mg daily, Metoprolol ER 25 mg twice daily  Denies chest pain, shortness of breath, dizziness  Vit D deficiency - patient takes Vit D 50,000 IU one capsule once a week  Hyperlipidemia - on Atorvastatin 40 mg daily  Hypothyroidism - on replacement therapy with  Levothyroxine  The following portions of the patient's history were reviewed and updated as appropriate: allergies, past family history, past medical history, past social history, past surgical history and problem list     Review of Systems   Constitutional: Positive for fatigue  Negative for activity change, appetite change, chills and fever  HENT: Negative for congestion, hearing loss, mouth sores, sore throat, tinnitus and trouble swallowing  Eyes: Negative  Respiratory: Negative for cough, chest tightness, shortness of breath and wheezing  Cardiovascular: Negative for chest pain, palpitations and leg swelling  Gastrointestinal: Negative for abdominal pain, blood in stool, constipation, diarrhea, nausea and vomiting  Genitourinary: Negative for difficulty urinating, dysuria, flank pain, frequency and hematuria  Musculoskeletal: Positive for arthralgias, myalgias and neck pain  Negative for joint swelling  Neurological: Positive for headaches  Negative for dizziness and syncope  Hematological: Negative  Psychiatric/Behavioral: Positive for dysphoric mood and sleep disturbance  Negative for suicidal ideas  The patient is nervous/anxious  Objective:      /90   Pulse 72   Temp 99 °F (37 2 °C) (Tympanic)   Resp 16   Ht 5' 6" (1 676 m)   Wt 87 1 kg (192 lb)   SpO2 98%   BMI 30 99 kg/m²          Physical Exam  Vitals and nursing note reviewed  Constitutional:       Appearance: Normal appearance  She is obese  HENT:      Head: Normocephalic and atraumatic        Right Ear: Tympanic membrane and external ear normal       Left Ear: Tympanic membrane and external ear normal    Eyes:      Conjunctiva/sclera: Conjunctivae normal  Pupils: Pupils are equal, round, and reactive to light  Neck:      Vascular: No carotid bruit  Cardiovascular:      Rate and Rhythm: Normal rate and regular rhythm  Heart sounds: No murmur heard  Pulmonary:      Effort: Pulmonary effort is normal       Breath sounds: Normal breath sounds  Abdominal:      General: Bowel sounds are normal  There is no distension  Palpations: Abdomen is soft  Tenderness: There is no abdominal tenderness  Musculoskeletal:         General: No swelling or tenderness  Normal range of motion  Cervical back: Normal range of motion and neck supple  No tenderness  Right lower leg: No edema  Left lower leg: No edema  Lymphadenopathy:      Cervical: No cervical adenopathy  Skin:     General: Skin is warm and dry  Findings: No rash  Neurological:      General: No focal deficit present  Mental Status: She is alert  Motor: No weakness  Gait: Gait normal    Psychiatric:      Comments: Feels depressed, anxious    Tearful during exam

## 2021-08-04 NOTE — ASSESSMENT & PLAN NOTE
Patient developed memory problems, decreased concentration after COVID-19 virus infection in April this year  Encouraged to stay physically, mentally and socially active  Will refer to PT  Consider neurology evaluation

## 2021-08-04 NOTE — ASSESSMENT & PLAN NOTE
Patient reports elevated blood pressure readings at home in 150's/ , headaches  Start Amlodipine 5 mg daily  Continue Losartan 100 mg daily, Metoprolol ER 25 mg twice daily  Follow a low-sodium diet  Continue to monitor blood pressure at home

## 2021-08-04 NOTE — ASSESSMENT & PLAN NOTE
Patient had COVID-19 virus infection in April this year  Recommended to stay well hydrated, follow a well-balanced diet  Will refer to [facility] [address] physical therapy  Start taking magnesium supplements  Will order blood work

## 2021-08-04 NOTE — ASSESSMENT & PLAN NOTE
Patient c/o worsening depression  Currently taking Citalopram 40 mg daily, Lamictal 200 mg daily, Klonopin 2 mg at bedtime  Family history is positive for Bipolar disorder in her father  Recommended to schedule appointment with Tito Shahid LCSW to start psychotherapy, facilitate referral to psychiatry to discuss medical therapy  Provided emotional support to patient

## 2021-08-06 DIAGNOSIS — E55.9 VITAMIN D DEFICIENCY: ICD-10-CM

## 2021-08-06 RX ORDER — ERGOCALCIFEROL 1.25 MG/1
CAPSULE ORAL
Qty: 12 CAPSULE | Refills: 1 | Status: SHIPPED | OUTPATIENT
Start: 2021-08-06 | End: 2022-05-26

## 2021-08-11 DIAGNOSIS — I10 ESSENTIAL HYPERTENSION: ICD-10-CM

## 2021-08-12 ENCOUNTER — TELEPHONE (OUTPATIENT)
Dept: FAMILY MEDICINE CLINIC | Facility: CLINIC | Age: 66
End: 2021-08-12

## 2021-08-12 RX ORDER — LOSARTAN POTASSIUM 100 MG/1
TABLET ORAL
Qty: 30 TABLET | Refills: 6 | Status: SHIPPED | OUTPATIENT
Start: 2021-08-12 | End: 2022-05-05

## 2021-08-13 LAB
25(OH)D3 SERPL-MCNC: 30 NG/ML (ref 30–100)
ALBUMIN SERPL-MCNC: 4.3 G/DL (ref 3.6–5.1)
ALBUMIN/GLOB SERPL: 1.7 (CALC) (ref 1–2.5)
ALP SERPL-CCNC: 60 U/L (ref 37–153)
ALT SERPL-CCNC: 18 U/L (ref 6–29)
ANA SER QL IF: NEGATIVE
AST SERPL-CCNC: 15 U/L (ref 10–35)
BASOPHILS # BLD AUTO: 29 CELLS/UL (ref 0–200)
BASOPHILS NFR BLD AUTO: 0.7 %
BILIRUB SERPL-MCNC: 0.7 MG/DL (ref 0.2–1.2)
BUN SERPL-MCNC: 16 MG/DL (ref 7–25)
BUN/CREAT SERPL: ABNORMAL (CALC) (ref 6–22)
CALCIUM SERPL-MCNC: 8.8 MG/DL (ref 8.6–10.4)
CHLORIDE SERPL-SCNC: 103 MMOL/L (ref 98–110)
CHOLEST SERPL-MCNC: 217 MG/DL
CHOLEST/HDLC SERPL: 4.1 (CALC)
CO2 SERPL-SCNC: 28 MMOL/L (ref 20–32)
CREAT SERPL-MCNC: 0.82 MG/DL (ref 0.5–0.99)
CRP SERPL-MCNC: 1.1 MG/L
EOSINOPHIL # BLD AUTO: 41 CELLS/UL (ref 15–500)
EOSINOPHIL NFR BLD AUTO: 1 %
ERYTHROCYTE [DISTWIDTH] IN BLOOD BY AUTOMATED COUNT: 13.4 % (ref 11–15)
ERYTHROCYTE [SEDIMENTATION RATE] IN BLOOD BY WESTERGREN METHOD: 6 MM/H
FERRITIN SERPL-MCNC: 86 NG/ML (ref 16–288)
GLOBULIN SER CALC-MCNC: 2.5 G/DL (CALC) (ref 1.9–3.7)
GLUCOSE SERPL-MCNC: 104 MG/DL (ref 65–99)
HCT VFR BLD AUTO: 38.9 % (ref 35–45)
HDLC SERPL-MCNC: 53 MG/DL
HGB BLD-MCNC: 12.6 G/DL (ref 11.7–15.5)
IRON SATN MFR SERPL: 26 % (CALC) (ref 16–45)
IRON SERPL-MCNC: 73 MCG/DL (ref 45–160)
LDLC SERPL CALC-MCNC: 128 MG/DL (CALC)
LYMPHOCYTES # BLD AUTO: 1632 CELLS/UL (ref 850–3900)
LYMPHOCYTES NFR BLD AUTO: 39.8 %
MCH RBC QN AUTO: 29 PG (ref 27–33)
MCHC RBC AUTO-ENTMCNC: 32.4 G/DL (ref 32–36)
MCV RBC AUTO: 89.6 FL (ref 80–100)
MONOCYTES # BLD AUTO: 312 CELLS/UL (ref 200–950)
MONOCYTES NFR BLD AUTO: 7.6 %
NEUTROPHILS # BLD AUTO: 2087 CELLS/UL (ref 1500–7800)
NEUTROPHILS NFR BLD AUTO: 50.9 %
NONHDLC SERPL-MCNC: 164 MG/DL (CALC)
PLATELET # BLD AUTO: 249 THOUSAND/UL (ref 140–400)
PMV BLD REES-ECKER: 10.3 FL (ref 7.5–12.5)
POTASSIUM SERPL-SCNC: 4.1 MMOL/L (ref 3.5–5.3)
PROT SERPL-MCNC: 6.8 G/DL (ref 6.1–8.1)
RBC # BLD AUTO: 4.34 MILLION/UL (ref 3.8–5.1)
RHEUMATOID FACT SERPL-ACNC: <14 IU/ML
SL AMB EGFR AFRICAN AMERICAN: 86 ML/MIN/1.73M2
SL AMB EGFR NON AFRICAN AMERICAN: 75 ML/MIN/1.73M2
SODIUM SERPL-SCNC: 138 MMOL/L (ref 135–146)
T4 FREE SERPL-MCNC: 1 NG/DL (ref 0.8–1.8)
TIBC SERPL-MCNC: 279 MCG/DL (CALC) (ref 250–450)
TRIGL SERPL-MCNC: 215 MG/DL
TSH SERPL-ACNC: 10.05 MIU/L (ref 0.4–4.5)
WBC # BLD AUTO: 4.1 THOUSAND/UL (ref 3.8–10.8)

## 2021-08-15 PROBLEM — G93.31 POSTVIRAL SYNDROME: Status: RESOLVED | Noted: 2021-08-03 | Resolved: 2021-08-15

## 2021-08-15 PROBLEM — G93.3 POSTVIRAL SYNDROME: Status: RESOLVED | Noted: 2021-08-03 | Resolved: 2021-08-15

## 2021-08-17 ENCOUNTER — TELEMEDICINE (OUTPATIENT)
Dept: FAMILY MEDICINE CLINIC | Facility: CLINIC | Age: 66
End: 2021-08-17
Payer: COMMERCIAL

## 2021-08-17 DIAGNOSIS — F33.2 SEVERE EPISODE OF RECURRENT MAJOR DEPRESSIVE DISORDER, WITHOUT PSYCHOTIC FEATURES (HCC): ICD-10-CM

## 2021-08-17 DIAGNOSIS — E03.9 ACQUIRED HYPOTHYROIDISM: ICD-10-CM

## 2021-08-17 DIAGNOSIS — F41.1 GENERALIZED ANXIETY DISORDER: ICD-10-CM

## 2021-08-17 DIAGNOSIS — U09.9 POST-COVID-19 SYNDROME: Primary | ICD-10-CM

## 2021-08-17 DIAGNOSIS — E55.9 VITAMIN D DEFICIENCY: ICD-10-CM

## 2021-08-17 DIAGNOSIS — I10 ESSENTIAL HYPERTENSION: ICD-10-CM

## 2021-08-17 PROCEDURE — 99214 OFFICE O/P EST MOD 30 MIN: CPT | Performed by: FAMILY MEDICINE

## 2021-08-17 PROCEDURE — 1160F RVW MEDS BY RX/DR IN RCRD: CPT | Performed by: FAMILY MEDICINE

## 2021-08-17 PROCEDURE — 1036F TOBACCO NON-USER: CPT | Performed by: FAMILY MEDICINE

## 2021-08-17 RX ORDER — LEVOTHYROXINE SODIUM 0.07 MG/1
75 TABLET ORAL
Qty: 30 TABLET | Refills: 5 | Status: SHIPPED | OUTPATIENT
Start: 2021-08-17 | End: 2022-05-20

## 2021-08-17 NOTE — ASSESSMENT & PLAN NOTE
Patient reports improvement in BP since last visit  Continue amlodipine 5 mg daily, losartan 100 mg daily, metoprolol ER 25 mg twice daily  Continue to monitor blood pressure at home

## 2021-08-17 NOTE — ASSESSMENT & PLAN NOTE
Patient reports improvement in joint pains, myalgias since last visit 2 weeks ago  Recommended to follow a well-balanced diet, stay well hydrated  Take Magnesium 400 mg daily  Start physical therapy as discussed at the last visit

## 2021-08-17 NOTE — PROGRESS NOTES
Virtual Regular Visit    Verification of patient location:    Patient is located in the following state in which I hold an active license [place]      Assessment/Plan:    Problem List Items Addressed This Visit        Endocrine    Hypothyroidism     TSH 10 05  Will increase dose of Levothyroxine to 75 mcg daily  Recommended to take medication on a regular basis  Recheck TSH in 2 months  Relevant Medications    levothyroxine 75 mcg tablet    Other Relevant Orders    TSH, 3rd generation with Free T4 reflex       Cardiovascular and Mediastinum    HTN (hypertension)     Patient reports improvement in BP since last visit  Continue amlodipine 5 mg daily, losartan 100 mg daily, metoprolol ER 25 mg twice daily  Continue to monitor blood pressure at home  Other    Generalized anxiety disorder     Recommended to schedule appointment with Arthur Moss LCSW to start psychotherapy  Vitamin D deficiency     Continue vit D 50,000 IU once a week  Severe episode of recurrent major depressive disorder, without psychotic features [facility])     Patient reports some improvement in symptoms  Continue Citalopram 40 mg daily, Lamictal 200 mg daily, Klonopin 2 mg at bedtime  Schedule psychotherapy and appointment with psychiatrist to review medical therapy  Post-COVID-19 syndrome - Primary     Patient reports improvement in joint pains, myalgias since last visit 2 weeks ago  Recommended to follow a well-balanced diet, stay well hydrated  Take Magnesium 400 mg daily  Start physical therapy as discussed at the last visit  Schedule follow-up office visit in 2 months           Reason for visit is   Chief Complaint   Patient presents with    Virtual Regular Visit     2 week follow up        Encounter provider Rob Foster MD    Provider located at [address] [place] [address]  [address] [place] [id number]      Recent Visits  Date Type Provider Dept   08/12/21 Telephone [address] [facility]   Showing recent visits within past 7 days and meeting all other requirements  Today's Visits  Date Type Provider Dept   08/17/21 Telemedicine Hammad Draper MD [address] today's visits and meeting all other requirements  Future Appointments  No visits were found meeting these conditions  Showing future appointments within next 150 days and meeting all other requirements       The patient was identified by name and date of birth  Federico Benavidez was informed that this is a telemedicine visit and that the visit is being conducted through [address] [place] [address] Now and patient was informed that this is a secure, HIPAA-compliant platform  She agrees to proceed     My office door was closed  No one else was in the room  She acknowledged consent and understanding of privacy and security of the video platform  The patient has agreed to participate and understands they can discontinue the visit at any time  Patient is aware this is a billable service  Subjective  Federico Benavidez is a 76 y o  female       HPI     Patient presents for 2 week virtual video follow-up visit  Patient reports some improvement in fatigue, muscle aches and joint pains since last visit  She started taking magnesium 400 mg daily, Biotin  Still c/o hair loss  HTN - blood pressure improved after starting on Amlodipine 5 mg daily  She also takes Losartan 100 mg daily, metoprolol ER 25 mg twice daily  BP yesterday in the morning was 138/95, then in the afternoon was 117/76  Denies chest pain, shortness of breath, dizziness  Reviewed blood work results from August 11, 2021  Vit D 25 hydroxy 30, Hb, 12 6, fasting blood sugar 104, creatinine 0 82, potassium 4 1   TSH 10 05  Sed rate 6, CRP 1 1,   TRISTAN- neg  Rheumatoid factor < 14    Total cholesterol 217, , HDL 53, triglycerides 215  GERARDO / Depression -patient did not schedule appointment with Javier Seip, LCSW yet  She was recommended at the last visit to start   psychotherapy and referred to psychiatry to review medications  Currently taking Citalopram 40 mg daily, Lamictal 200 mg daily, Klonopin 2 mg bedtime  Hypothyroidism - patient states that she occasionally forgets to take Levothyroxine  She was prescribed Levothyroxine 50 mcg 1 tablet Monday through Thursday and 1 5 tab  on Friday, Saturday and Sunday  Hyperlipidemia -taking Atorvastatin 40 mg daily  Patient continues to work full-time  She did not schedule physical therapy for post COVID viral syndrome as discussed 2 weeks ago ( had COVID -19 virus infection April this year)    Patient received J&J COVID vaccination on August 14, 2021  Reports no adverse side effects  Past Medical History:   Diagnosis Date    Anxiety     Depression     Disease of thyroid gland     hypo    Dissection of carotid artery (HCC)     Left    Diverticulitis     Herniated lumbar intervertebral disc     Hypertension     Stroke ([facility] [address] )     Transient cerebral ischemia     Pt with hx TIA/CVA in 1/2009  Off coumadin per vascular  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  Last assessed: 06/01/12       Past Surgical History:   Procedure Laterality Date    BACK SURGERY  1992    Lower, for herniated disc   CHOLECYSTECTOMY      COLONOSCOPY      FRACTURE SURGERY      Open treatment of fracture of one metacarpal bone, right 3rd metacarpal pin   [place] COLONOSCOPY FLX DX W/COLLJ SPEC WHEN PFRMD N/A 3/30/2016    Procedure: COLONOSCOPY;  Surgeon: Chris Ribeiro MD;  [facility];   Service: Colorectal       Current Outpatient Medications   Medication Sig Dispense Refill    amLODIPine (NORVASC) 5 mg tablet Take 1 tablet (5 mg total) by mouth daily 30 tablet 5    atorvastatin (LIPITOR) 40 mg tablet take 1 tablet by mouth once daily 30 tablet 6  Biotin 5 MG TABS Take 1 tab  daily 30 tablet 5    citalopram (CeleXA) 40 mg tablet TAKE 1 TABLET BY MOUTH ONCE DAILY 30 tablet 6    clonazePAM (KlonoPIN) 2 mg tablet take 1 tablet by mouth at bedtime 30 tablet 5    ergocalciferol (VITAMIN D2) 50,000 units TAKE 1 CAPSULE BY MOUTH ONCE A WEEK 12 capsule 1    lamoTRIgine (LaMICtal) 200 MG tablet take 1 tablet by mouth once daily 30 tablet 6    levothyroxine 75 mcg tablet Take 1 tablet (75 mcg total) by mouth daily in the early morning 30 tablet 5    losartan (COZAAR) 100 MG tablet take 1 tablet by mouth once daily 30 tablet 6    Magnesium 400 MG CAPS Take 1 caps  daily 30 capsule 5    methocarbamol (ROBAXIN) 500 mg tablet Take 1 tab  every 8 hr  PRN for back pain/ muscle spasms 30 tablet 0    metoprolol succinate (TOPROL-XL) 25 mg 24 hr tablet take 1 tablet by mouth twice a day 60 tablet 6     No current facility-administered medications for this visit  Allergies   Allergen Reactions    Bactrim [Sulfamethoxazole-Trimethoprim] Hives     Other reaction(s): "red spots"       Review of Systems   Constitutional: Positive for fatigue (improved)  Negative for activity change, appetite change, chills and fever  HENT: Negative for congestion, ear pain, sore throat and trouble swallowing  Eyes: Negative  Respiratory: Negative for cough, chest tightness, shortness of breath and wheezing  Cardiovascular: Negative for chest pain, palpitations and leg swelling  Gastrointestinal: Negative for abdominal pain, constipation, diarrhea, nausea and vomiting  Musculoskeletal: Negative for joint swelling  Reports improvement in joint pains, myalgias   Skin: Negative for rash  Neurological: Positive for headaches (improved)  Negative for dizziness and syncope  Hematological: Negative  Psychiatric/Behavioral: Positive for sleep disturbance  Negative for suicidal ideas          Anxiety / Depression - mood has been stable       Video Exam    There were no vitals filed for this visit  Physical Exam  Vitals and nursing note reviewed  Constitutional:       Appearance: Normal appearance  HENT:      Head: Normocephalic and atraumatic  Eyes:      Conjunctiva/sclera: Conjunctivae normal       Pupils: Pupils are equal, round, and reactive to light  Cardiovascular:      Comments: Denies chest pain   Pulmonary:      Effort: Pulmonary effort is normal       Breath sounds: No wheezing  Abdominal:      General: There is no distension  Tenderness: There is no abdominal tenderness  Musculoskeletal:      Cervical back: Normal range of motion and neck supple  Comments: No joints swelling   Skin:     Findings: No rash  Neurological:      Mental Status: She is alert  Psychiatric:         Mood and Affect: Mood normal          Behavior: Behavior normal          Thought Content: Thought content normal          Judgment: Judgment normal           I spent 30 minutes directly with the patient during this visit    VIRTUAL VISIT Isak verbally agrees to participate in [employer]  Pt is aware that [employer] could be limited without vital signs or the ability to perform a full hands-on physical exam  Gayle Campos understands she or the provider may request at any time to terminate the video visit and request the patient to seek care or treatment in person

## 2021-08-17 NOTE — ASSESSMENT & PLAN NOTE
Patient reports some improvement in symptoms  Continue Citalopram 40 mg daily, Lamictal 200 mg daily, Klonopin 2 mg at bedtime  Schedule psychotherapy and appointment with psychiatrist to review medical therapy

## 2021-08-31 ENCOUNTER — VBI (OUTPATIENT)
Dept: ADMINISTRATIVE | Facility: OTHER | Age: 66
End: 2021-08-31

## 2021-09-17 ENCOUNTER — TELEPHONE (OUTPATIENT)
Dept: PSYCHIATRY | Facility: CLINIC | Age: 66
End: 2021-09-17

## 2021-10-05 DIAGNOSIS — E78.2 MIXED HYPERLIPIDEMIA: ICD-10-CM

## 2021-10-06 RX ORDER — ATORVASTATIN CALCIUM 40 MG/1
TABLET, FILM COATED ORAL
Qty: 30 TABLET | Refills: 6 | Status: SHIPPED | OUTPATIENT
Start: 2021-10-06 | End: 2022-06-06

## 2021-11-01 ENCOUNTER — VBI (OUTPATIENT)
Dept: ADMINISTRATIVE | Facility: OTHER | Age: 66
End: 2021-11-01

## 2021-11-04 ENCOUNTER — VBI (OUTPATIENT)
Dept: ADMINISTRATIVE | Facility: OTHER | Age: 66
End: 2021-11-04

## 2021-11-23 DIAGNOSIS — F41.8 DEPRESSION WITH ANXIETY: ICD-10-CM

## 2021-11-23 RX ORDER — LAMOTRIGINE 200 MG/1
TABLET ORAL
Qty: 30 TABLET | Refills: 6 | Status: SHIPPED | OUTPATIENT
Start: 2021-11-23 | End: 2022-08-10

## 2021-12-07 DIAGNOSIS — I10 ESSENTIAL HYPERTENSION: ICD-10-CM

## 2021-12-07 RX ORDER — METOPROLOL SUCCINATE 25 MG/1
TABLET, EXTENDED RELEASE ORAL
Qty: 60 TABLET | Refills: 6 | Status: SHIPPED | OUTPATIENT
Start: 2021-12-07

## 2022-02-09 ENCOUNTER — OFFICE VISIT (OUTPATIENT)
Dept: FAMILY MEDICINE CLINIC | Facility: CLINIC | Age: 67
End: 2022-02-09
Payer: COMMERCIAL

## 2022-02-09 VITALS
HEART RATE: 68 BPM | WEIGHT: 186 LBS | DIASTOLIC BLOOD PRESSURE: 82 MMHG | HEIGHT: 66 IN | RESPIRATION RATE: 16 BRPM | TEMPERATURE: 99.3 F | SYSTOLIC BLOOD PRESSURE: 138 MMHG | BODY MASS INDEX: 29.89 KG/M2

## 2022-02-09 DIAGNOSIS — Z11.59 NEED FOR HEPATITIS C SCREENING TEST: ICD-10-CM

## 2022-02-09 DIAGNOSIS — E03.9 ACQUIRED HYPOTHYROIDISM: ICD-10-CM

## 2022-02-09 DIAGNOSIS — F41.1 GENERALIZED ANXIETY DISORDER: ICD-10-CM

## 2022-02-09 DIAGNOSIS — E55.9 VITAMIN D DEFICIENCY: ICD-10-CM

## 2022-02-09 DIAGNOSIS — F33.0 MILD EPISODE OF RECURRENT MAJOR DEPRESSIVE DISORDER (HCC): ICD-10-CM

## 2022-02-09 DIAGNOSIS — I10 PRIMARY HYPERTENSION: ICD-10-CM

## 2022-02-09 DIAGNOSIS — Z11.1 SCREENING FOR TUBERCULOSIS: ICD-10-CM

## 2022-02-09 DIAGNOSIS — Z78.0 MENOPAUSE: ICD-10-CM

## 2022-02-09 DIAGNOSIS — Z12.31 ENCOUNTER FOR SCREENING MAMMOGRAM FOR MALIGNANT NEOPLASM OF BREAST: ICD-10-CM

## 2022-02-09 DIAGNOSIS — Z00.00 WELL ADULT EXAM: Primary | ICD-10-CM

## 2022-02-09 DIAGNOSIS — Z13.820 SCREENING FOR OSTEOPOROSIS: ICD-10-CM

## 2022-02-09 DIAGNOSIS — Z23 ENCOUNTER FOR IMMUNIZATION: ICD-10-CM

## 2022-02-09 DIAGNOSIS — E78.2 MIXED HYPERLIPIDEMIA: ICD-10-CM

## 2022-02-09 PROBLEM — M25.552 LEFT HIP PAIN: Status: RESOLVED | Noted: 2020-07-21 | Resolved: 2022-02-09

## 2022-02-09 PROBLEM — U07.1 COVID-19 VIRUS INFECTION: Status: RESOLVED | Noted: 2021-04-21 | Resolved: 2022-02-09

## 2022-02-09 PROBLEM — M79.10 MYALGIA: Status: RESOLVED | Noted: 2021-08-03 | Resolved: 2022-02-09

## 2022-02-09 PROCEDURE — 1036F TOBACCO NON-USER: CPT | Performed by: FAMILY MEDICINE

## 2022-02-09 PROCEDURE — 4040F PNEUMOC VAC/ADMIN/RCVD: CPT | Performed by: FAMILY MEDICINE

## 2022-02-09 PROCEDURE — 90670 PCV13 VACCINE IM: CPT

## 2022-02-09 PROCEDURE — 3725F SCREEN DEPRESSION PERFORMED: CPT | Performed by: FAMILY MEDICINE

## 2022-02-09 PROCEDURE — 90471 IMMUNIZATION ADMIN: CPT

## 2022-02-09 PROCEDURE — 1160F RVW MEDS BY RX/DR IN RCRD: CPT | Performed by: FAMILY MEDICINE

## 2022-02-09 PROCEDURE — 86580 TB INTRADERMAL TEST: CPT

## 2022-02-09 PROCEDURE — 99397 PER PM REEVAL EST PAT 65+ YR: CPT | Performed by: FAMILY MEDICINE

## 2022-02-09 PROCEDURE — 3008F BODY MASS INDEX DOCD: CPT | Performed by: FAMILY MEDICINE

## 2022-02-09 NOTE — ASSESSMENT & PLAN NOTE
Symptoms improved  [facility] 9 score 3  Continue Citalopram 40 mg daily, Lamictal 200 mg daily

## 2022-02-09 NOTE — ASSESSMENT & PLAN NOTE
Bp remains stable  Continue Amlodipine mg daily, Losartan 100 mg daily Metoprolol ER 20 mg twice daily  Follow a low-sodium diet  Stay well hydrated

## 2022-02-09 NOTE — PROGRESS NOTES
Chief Complaint   Patient presents with    Annual Exam     Health Maintenance   Topic Date Due    Hepatitis C Screening  Never done    Osteoporosis Screening  Never done    DTaP,Tdap,and Td Vaccines (1 - Tdap) 06/03/2013    Breast Cancer Screening: Mammogram  08/07/2019    Annual Physical  02/11/2021    Influenza Vaccine (1) 09/01/2021    COVID-19 Vaccine (2 - Booster for Mee series) 10/09/2021    BMI: Followup Plan  02/22/2022    Fall Risk  04/30/2022    BMI: Adult  02/09/2023    Depression Remission PHQ  02/09/2023    Pneumococcal Vaccine: 65+ Years (2 of 2 - PPSV23) 02/09/2023    Colorectal Cancer Screening  06/16/2031    HIB Vaccine  Aged Out    Hepatitis B Vaccine  Aged Out    IPV Vaccine  Aged Out    Hepatitis A Vaccine  Aged Out    Meningococcal ACWY Vaccine  Aged Out    HPV Vaccine  Aged Out     BMI Counseling: Body mass index is 30 02 kg/m²  The BMI is above normal  Nutrition recommendations include decreasing portion sizes, encouraging healthy choices of fruits and vegetables, decreasing fast food intake, consuming healthier snacks, limiting drinks that contain sugar, moderation in carbohydrate intake, increasing intake of lean protein, reducing intake of saturated and trans fat and reducing intake of cholesterol  Exercise recommendations include exercising 3-5 times per week  No pharmacotherapy was ordered  Rationale for BMI follow-up plan is due to patient being overweight or obese  Assessment/Plan:    Well adult exam  Recommended follow a well-balanced diet, regular exercise  PPD administered today  Check PPD in 2 days  Prevnar 13 administered  Schedule screening mammogram, DEXA scan  Patient had colonoscopy June 2021  Colorectal surgeon Dr Libby Osman recommended to repeat colonoscopy in 10 years  Recommended to schedule COVID booster with Moderna or Pfizer vaccine in 1-2 months  HTN (hypertension)  Bp remains stable    Continue Amlodipine mg daily, Losartan 100 mg daily Metoprolol ER 20 mg twice daily  Follow a low-sodium diet  Stay well hydrated  Mixed hyperlipidemia  Continue Atorvastatin 40 mg daily  Check CMP, lipid panel  Hypothyroidism  Patient currently taking levothyroxine 75 mcg daily  Check TSH  Generalized anxiety disorder  Mood has been stable  Continue Citalopram 40 mg daily, Klonopin 2 mg at bedtime  Recurrent major depressive disorder (HCC)  Symptoms improved  PQH 9 score 3  Continue Citalopram 40 mg daily, Lamictal 200 mg daily  Vitamin D deficiency  Continue Vit D 50,000 IU one caps  weekly  Schedule follow-up visit in 6 months  Diagnoses and all orders for this visit:    Well adult exam    Primary hypertension  -     Comprehensive metabolic panel; Future  -     Comprehensive metabolic panel    Mixed hyperlipidemia  -     Comprehensive metabolic panel; Future  -     Lipid panel; Future  -     Comprehensive metabolic panel  -     Lipid panel    Acquired hypothyroidism  -     TSH, 3rd generation with Free T4 reflex; Future  -     TSH, 3rd generation with Free T4 reflex    Vitamin D deficiency    Generalized anxiety disorder    Mild episode of recurrent major depressive disorder ([facility] [address] )    Encounter for screening mammogram for malignant neoplasm of breast  -     Mammo screening bilateral w cad; Future    Need for hepatitis C screening test  -     Hepatitis C Antibody ([facility]); Future    Encounter for immunization  -     PNEUMOCOCCAL CONJUGATE VACCINE 13-VALENT GREATER THAN 6 MONTHS    Menopause  -     DXA bone density spine hip and pelvis; Future    Screening for osteoporosis  -     DXA bone density spine hip and pelvis; Future    Screening for tuberculosis  -     TB Skin Test          Subjective:      Patient ID: Gayle Knight is a 78 y o  female      HPI     Patient presents for annual physical exam       She works at [employer] brought form to fill out for work, needs PPD test       No prior history of positive PPD testing  PMHx: HTN, Hyperlipidemia, Obesity, Hypothyroidism, Depression, Anxiety, Vit D deficiency  Reviewed current medications, last blood test results from August 2021  HTN - patient takes Losartan 100 mg daily, Metoprolol ER 25 mg twice daily, Amlodipine 5 mg daily  Reports no chest pain, shortness of breath, dizziness  Hypothyroidism - currently taking Levothyroxine 75 mcg daily  Hyperlipidemia -on Atorvastatin 40 mg daily  Reports no side effects from statin therapy  Depression / Anxiety- reports improvement in symptoms  Patient takes citalopram 40 mg daily, Lamictal 200 mg daily Klonopin 2 mg at bedtime  C/o increased stress of work, plans to retire later this year or next year  Patient had Covid -19  virus infection April 2021  She had J&J Covid  Vaccination in 8/21  Colonoscopy performed in June 2021 by colorectal surgeon Dr Sara Cole who recommended to repeat colonoscopy in 10 years  Patient did not schedule screening mammogram     No prior DEXA scan  The following portions of the patient's history were reviewed and updated as appropriate: allergies, current medications, past family history, past medical history, past surgical history and problem list     Review of Systems   Constitutional: Positive for fatigue (mild)  Negative for activity change, appetite change, chills and fever  HENT: Negative for congestion, ear pain, nosebleeds, sore throat and trouble swallowing  Eyes: Negative for pain, discharge, redness, itching and visual disturbance  Respiratory: Negative for cough, chest tightness, shortness of breath and wheezing  Cardiovascular: Negative for chest pain, palpitations and leg swelling  Gastrointestinal: Negative for abdominal pain, blood in stool, constipation, diarrhea and vomiting  Genitourinary: Negative for difficulty urinating, dysuria, flank pain, frequency and hematuria     Musculoskeletal: Positive for arthralgias  Negative for back pain, gait problem, joint swelling and neck pain  Skin: Negative for rash  Neurological: Negative for dizziness, syncope and headaches  Hematological: Negative  Psychiatric/Behavioral: Negative for sleep disturbance  Anxiety/ Depression - mood has been stable         Objective:      /82 (BP Location: Left arm, Patient Position: Sitting, Cuff Size: Standard)   Pulse 68   Temp 99 3 °F (37 4 °C) (Tympanic)   Resp 16   Ht 5' 6" (1 676 m)   Wt 84 4 kg (186 lb)   BMI 30 02 kg/m²          Physical Exam  Vitals and nursing note reviewed  Constitutional:       Appearance: Normal appearance  She is obese  HENT:      Head: Normocephalic and atraumatic  Eyes:      Conjunctiva/sclera: Conjunctivae normal       Pupils: Pupils are equal, round, and reactive to light  Neck:      Vascular: No carotid bruit  Cardiovascular:      Rate and Rhythm: Normal rate and regular rhythm  Heart sounds: No murmur heard  Pulmonary:      Effort: Pulmonary effort is normal       Breath sounds: Normal breath sounds  Abdominal:      General: Bowel sounds are normal  There is no distension  Palpations: Abdomen is soft  Tenderness: There is no abdominal tenderness  Musculoskeletal:         General: No swelling, tenderness or deformity  Normal range of motion  Cervical back: Normal range of motion and neck supple  Right lower leg: No edema  Left lower leg: No edema  Skin:     General: Skin is warm and dry  Findings: No rash  Neurological:      General: No focal deficit present  Mental Status: She is alert  Motor: No weakness        Gait: Gait normal    Psychiatric:         Mood and Affect: Mood normal

## 2022-02-09 NOTE — ASSESSMENT & PLAN NOTE
Recommended follow a well-balanced diet, regular exercise  PPD administered today  Check PPD in 2 days  Prevnar 13 administered  Schedule screening mammogram, DEXA scan  Patient had colonoscopy June 2021  Colorectal surgeon Dr Salome Curiel recommended to repeat colonoscopy in 10 years  Recommended to schedule COVID booster with Moderna or Pfizer vaccine in 1-2 months

## 2022-02-10 ENCOUNTER — TELEPHONE (OUTPATIENT)
Dept: FAMILY MEDICINE CLINIC | Facility: CLINIC | Age: 67
End: 2022-02-10

## 2022-02-10 NOTE — TELEPHONE ENCOUNTER
Patient states she was told she could come at 12:30 p m  2/11/22 for her PPD Read but MY Chart is stating it is 2:45 p m  and she can't come that late  Please call patient at [phone]

## 2022-02-11 ENCOUNTER — CLINICAL SUPPORT (OUTPATIENT)
Dept: FAMILY MEDICINE CLINIC | Facility: CLINIC | Age: 67
End: 2022-02-11

## 2022-02-11 DIAGNOSIS — Z11.1 ENCOUNTER FOR PPD SKIN TEST READING: Primary | ICD-10-CM

## 2022-02-11 LAB
INDURATION: 0 MM
TB SKIN TEST: NEGATIVE

## 2022-03-15 DIAGNOSIS — F41.8 DEPRESSION WITH ANXIETY: ICD-10-CM

## 2022-03-15 DIAGNOSIS — I10 ESSENTIAL HYPERTENSION: ICD-10-CM

## 2022-03-15 RX ORDER — CITALOPRAM 40 MG/1
TABLET ORAL
Qty: 30 TABLET | Refills: 6 | Status: SHIPPED | OUTPATIENT
Start: 2022-03-15

## 2022-03-15 RX ORDER — AMLODIPINE BESYLATE 5 MG/1
5 TABLET ORAL DAILY
Qty: 30 TABLET | Refills: 5 | Status: SHIPPED | OUTPATIENT
Start: 2022-03-15

## 2022-04-15 LAB
ALBUMIN SERPL-MCNC: 4.3 G/DL (ref 3.6–5.1)
ALBUMIN/GLOB SERPL: 1.8 (CALC) (ref 1–2.5)
ALP SERPL-CCNC: 63 U/L (ref 37–153)
ALT SERPL-CCNC: 15 U/L (ref 6–29)
AST SERPL-CCNC: 15 U/L (ref 10–35)
BILIRUB SERPL-MCNC: 0.9 MG/DL (ref 0.2–1.2)
BUN SERPL-MCNC: 15 MG/DL (ref 7–25)
BUN/CREAT SERPL: NORMAL (CALC) (ref 6–22)
CALCIUM SERPL-MCNC: 9 MG/DL (ref 8.6–10.4)
CHLORIDE SERPL-SCNC: 105 MMOL/L (ref 98–110)
CHOLEST SERPL-MCNC: 183 MG/DL
CHOLEST/HDLC SERPL: 3.2 (CALC)
CO2 SERPL-SCNC: 29 MMOL/L (ref 20–32)
CREAT SERPL-MCNC: 0.79 MG/DL (ref 0.5–0.99)
GLOBULIN SER CALC-MCNC: 2.4 G/DL (CALC) (ref 1.9–3.7)
GLUCOSE SERPL-MCNC: 91 MG/DL (ref 65–99)
HDLC SERPL-MCNC: 57 MG/DL
LDLC SERPL CALC-MCNC: 99 MG/DL (CALC)
NONHDLC SERPL-MCNC: 126 MG/DL (CALC)
POTASSIUM SERPL-SCNC: 4.2 MMOL/L (ref 3.5–5.3)
PROT SERPL-MCNC: 6.7 G/DL (ref 6.1–8.1)
SL AMB EGFR AFRICAN AMERICAN: 90 ML/MIN/1.73M2
SL AMB EGFR NON AFRICAN AMERICAN: 77 ML/MIN/1.73M2
SODIUM SERPL-SCNC: 141 MMOL/L (ref 135–146)
TRIGL SERPL-MCNC: 175 MG/DL
TSH SERPL-ACNC: 2.2 MIU/L (ref 0.4–4.5)

## 2022-05-05 DIAGNOSIS — I10 ESSENTIAL HYPERTENSION: ICD-10-CM

## 2022-05-05 RX ORDER — LOSARTAN POTASSIUM 100 MG/1
TABLET ORAL
Qty: 30 TABLET | Refills: 6 | Status: SHIPPED | OUTPATIENT
Start: 2022-05-05

## 2022-05-19 DIAGNOSIS — E03.9 ACQUIRED HYPOTHYROIDISM: ICD-10-CM

## 2022-05-20 ENCOUNTER — VBI (OUTPATIENT)
Dept: ADMINISTRATIVE | Facility: OTHER | Age: 67
End: 2022-05-20

## 2022-05-20 RX ORDER — LEVOTHYROXINE SODIUM 0.07 MG/1
75 TABLET ORAL
Qty: 30 TABLET | Refills: 5 | Status: SHIPPED | OUTPATIENT
Start: 2022-05-20

## 2022-05-26 DIAGNOSIS — E55.9 VITAMIN D DEFICIENCY: ICD-10-CM

## 2022-05-26 RX ORDER — ERGOCALCIFEROL 1.25 MG/1
CAPSULE ORAL
Qty: 12 CAPSULE | Refills: 1 | Status: SHIPPED | OUTPATIENT
Start: 2022-05-26

## 2022-06-06 DIAGNOSIS — E78.2 MIXED HYPERLIPIDEMIA: ICD-10-CM

## 2022-06-06 RX ORDER — ATORVASTATIN CALCIUM 40 MG/1
TABLET, FILM COATED ORAL
Qty: 30 TABLET | Refills: 6 | Status: SHIPPED | OUTPATIENT
Start: 2022-06-06

## 2022-07-26 ENCOUNTER — OFFICE VISIT (OUTPATIENT)
Dept: URGENT CARE | Age: 67
End: 2022-07-26
Payer: COMMERCIAL

## 2022-07-26 ENCOUNTER — APPOINTMENT (OUTPATIENT)
Dept: RADIOLOGY | Age: 67
End: 2022-07-26
Payer: COMMERCIAL

## 2022-07-26 VITALS
RESPIRATION RATE: 18 BRPM | DIASTOLIC BLOOD PRESSURE: 77 MMHG | OXYGEN SATURATION: 97 % | TEMPERATURE: 97 F | SYSTOLIC BLOOD PRESSURE: 152 MMHG | HEART RATE: 66 BPM

## 2022-07-26 DIAGNOSIS — M54.31 SCIATICA OF RIGHT SIDE: ICD-10-CM

## 2022-07-26 DIAGNOSIS — M54.31 SCIATICA OF RIGHT SIDE: Primary | ICD-10-CM

## 2022-07-26 PROCEDURE — G0382 LEV 3 HOSP TYPE B ED VISIT: HCPCS | Performed by: FAMILY MEDICINE

## 2022-07-26 PROCEDURE — 72110 X-RAY EXAM L-2 SPINE 4/>VWS: CPT

## 2022-07-26 PROCEDURE — S9083 URGENT CARE CENTER GLOBAL: HCPCS | Performed by: FAMILY MEDICINE

## 2022-07-26 RX ORDER — NAPROXEN 375 MG/1
375 TABLET ORAL 2 TIMES DAILY WITH MEALS
Qty: 15 TABLET | Refills: 0 | Status: SHIPPED | OUTPATIENT
Start: 2022-07-26

## 2022-07-26 RX ORDER — PREDNISONE 20 MG/1
TABLET ORAL
Qty: 10 TABLET | Refills: 0 | Status: SHIPPED | OUTPATIENT
Start: 2022-07-26 | End: 2022-09-27 | Stop reason: ALTCHOICE

## 2022-07-26 NOTE — PROGRESS NOTES
[facility]        NAME: Mabeline Bamberger is a 78 y o  female  : 1955    MRN: [id number]  DATE: 2022  TIME: 12:59 PM    Assessment and Plan   Sciatica of right side [M54 31]  1  Sciatica of right side  XR spine lumbar minimum 4 views non injury   Will give trial of NSAIDS and steroids  Patient Instructions       Follow up with PCP in 3-5 days  Proceed to  ER if symptoms worsen  Chief Complaint     Chief Complaint   Patient presents with    Back Pain     Right lower back , down into leg, x months, denies injury          History of Present Illness       Ibrahima Zambrano complains of low back pain  She has had it for 6-12 months but worse the past few days  Pain is right lower back and radiates to the right thigh  Has associated numbness  No weakness  She also feels a lump in her right lower back  She tried tylenol which did not help  Pain aggravated by sitting and standing  She says she gained weight and thinks it aggravated it  She has a history of lumbar disc surgery in   Review of Systems   Review of Systems   Genitourinary: Negative for difficulty urinating, dyspareunia, dysuria and flank pain           Current Medications       Current Outpatient Medications:     amLODIPine (NORVASC) 5 mg tablet, TAKE 1 TABLET (5 MG TOTAL) BY MOUTH DAILY, Disp: 30 tablet, Rfl: 5    atorvastatin (LIPITOR) 40 mg tablet, take 1 tablet by mouth once daily, Disp: 30 tablet, Rfl: 6    Biotin 5 MG TABS, Take 1 tab  daily, Disp: 30 tablet, Rfl: 5    citalopram (CeleXA) 40 mg tablet, TAKE 1 TABLET BY MOUTH ONCE DAILY, Disp: 30 tablet, Rfl: 6    clonazePAM (KlonoPIN) 2 mg tablet, take 1 tablet by mouth at bedtime, Disp: 30 tablet, Rfl: 5    ergocalciferol (VITAMIN D2) 50,000 units, TAKE 1 CAPSULE BY MOUTH ONCE A WEEK, Disp: 12 capsule, Rfl: 1    lamoTRIgine (LaMICtal) 200 MG tablet, take 1 tablet by mouth daily, Disp: 30 tablet, Rfl: 6    levothyroxine 75 mcg tablet, TAKE 1 TABLET (75 MCG TOTAL) BY MOUTH DAILY IN THE EARLY MORNING, Disp: 30 tablet, Rfl: 5    losartan (COZAAR) 100 MG tablet, take 1 tablet by mouth once daily, Disp: 30 tablet, Rfl: 6    Magnesium 400 MG CAPS, Take 1 caps  daily, Disp: 30 capsule, Rfl: 5    methocarbamol (ROBAXIN) 500 mg tablet, Take 1 tab  every 8 hr  PRN for back pain/ muscle spasms, Disp: 30 tablet, Rfl: 0    metoprolol succinate (TOPROL-XL) 25 mg 24 hr tablet, take 1 tablet by mouth twice a day, Disp: 60 tablet, Rfl: 6    Current Allergies     Allergies as of 07/26/2022 - Reviewed 07/26/2022   Allergen Reaction Noted    Bactrim [sulfamethoxazole-trimethoprim] Hives 10/16/2015            The following portions of the patient's history were reviewed and updated as appropriate: allergies, current medications, past family history, past medical history, past social history, past surgical history and problem list      Past Medical History:   Diagnosis Date    Anxiety     Depression     Disease of thyroid gland     hypo    Dissection of carotid artery ([facility] [address] )     Left    Diverticulitis     Herniated lumbar intervertebral disc     Hypertension     Stroke ([facility] [address] )     Transient cerebral ischemia     Pt with hx TIA/CVA in 1/2009  Off coumadin per vascular  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  Last assessed: 06/01/12       Past Surgical History:   Procedure Laterality Date    BACK SURGERY  1992    Lower, for herniated disc   CHOLECYSTECTOMY      COLONOSCOPY      FRACTURE SURGERY      Open treatment of fracture of one metacarpal bone, right 3rd metacarpal pin   [place] COLONOSCOPY FLX DX W/COLLJ SPEC WHEN PFRMD N/A 3/30/2016    Procedure: COLONOSCOPY;  Surgeon: Shaggy Mace MD;  [facility];   Service: Colorectal       Family History   Problem Relation Age of Onset    Diabetes Mother     Depression Father     Coronary artery disease Father     Diabetes Daughter     Cancer Sister          Medications have been verified  Objective   /77   Pulse 66   Temp (!) 97 °F (36 1 °C)   Resp 18   SpO2 97%        Physical Exam     Physical Exam  Constitutional:       General: She is not in acute distress  Appearance: Normal appearance  She is not ill-appearing  Musculoskeletal:      Comments: No spinal tenderness or masses  Negative SLR  Neurological:      Mental Status: She is alert  Motor: No weakness        Gait: Gait normal       Deep Tendon Reflexes: Reflexes normal

## 2022-07-28 ENCOUNTER — TELEPHONE (OUTPATIENT)
Dept: FAMILY MEDICINE CLINIC | Facility: CLINIC | Age: 67
End: 2022-07-28

## 2022-07-28 NOTE — TELEPHONE ENCOUNTER
Patient ([phone]) called to report having x-ray performed at [facility] on 7/26/22 for back after visit to urgent care for back/sciatica pain  States results were reviewed and she was given medication  Has further questions  Asks if PCP could review results and call to go over them in more detail  Requests call to confirm

## 2022-07-29 NOTE — TELEPHONE ENCOUNTER
I called patient  Reviewed x-ray of the lumbar spine which showed scoliosis, degenerative disc disease of the lumbar spine  Patient was seen at urgent care center , was prescribed Prednisone course and NSAID's  Recommended to call office  next week if symptoms persist or worsen, may need referral to PT or pain management

## 2022-08-03 ENCOUNTER — RA CDI HCC (OUTPATIENT)
Dept: OTHER | Facility: HOSPITAL | Age: 67
End: 2022-08-03

## 2022-08-03 NOTE — PROGRESS NOTES
[facility]  coding opportunities       Chart reviewed, no opportunity found: CHART REVIEWED, NO OPPORTUNITY FOUND        Patients Insurance        Commercial Insurance: [address] [place] [address]

## 2022-08-10 DIAGNOSIS — F41.8 DEPRESSION WITH ANXIETY: ICD-10-CM

## 2022-08-10 RX ORDER — LAMOTRIGINE 200 MG/1
TABLET ORAL
Qty: 30 TABLET | Refills: 6 | Status: SHIPPED | OUTPATIENT
Start: 2022-08-10

## 2022-08-25 ENCOUNTER — VBI (OUTPATIENT)
Dept: ADMINISTRATIVE | Facility: OTHER | Age: 67
End: 2022-08-25

## 2022-09-21 ENCOUNTER — TELEPHONE (OUTPATIENT)
Dept: FAMILY MEDICINE CLINIC | Facility: CLINIC | Age: 67
End: 2022-09-21

## 2022-09-21 NOTE — TELEPHONE ENCOUNTER
Patient ([phone]) called to to reschedule follow up that was canceled for August  Appointment rescheduled for 1/18/23  Patient asks if provider would like patient to have lab work done prior to visit  Requests call to confirm if order is placed

## 2022-09-22 DIAGNOSIS — E78.2 MIXED HYPERLIPIDEMIA: ICD-10-CM

## 2022-09-22 DIAGNOSIS — E03.9 HYPOTHYROIDISM, UNSPECIFIED TYPE: ICD-10-CM

## 2022-09-22 DIAGNOSIS — I10 PRIMARY HYPERTENSION: Primary | ICD-10-CM

## 2022-09-22 NOTE — TELEPHONE ENCOUNTER
Please call patient  Recommend to check blood work next month  Last blood test done in April  Order placed in chart  Patient should not wait till January to see me, she can schedule appointment for follow-up with PRISCILLA Ang this fall

## 2022-09-26 LAB
ALBUMIN SERPL-MCNC: 4.2 G/DL (ref 3.6–5.1)
ALBUMIN/GLOB SERPL: 1.8 (CALC) (ref 1–2.5)
ALP SERPL-CCNC: 73 U/L (ref 37–153)
ALT SERPL-CCNC: 13 U/L (ref 6–29)
AST SERPL-CCNC: 13 U/L (ref 10–35)
BASOPHILS # BLD AUTO: 51 CELLS/UL (ref 0–200)
BASOPHILS NFR BLD AUTO: 1.1 %
BILIRUB SERPL-MCNC: 0.4 MG/DL (ref 0.2–1.2)
BUN SERPL-MCNC: 18 MG/DL (ref 7–25)
BUN/CREAT SERPL: NORMAL (CALC) (ref 6–22)
CALCIUM SERPL-MCNC: 9 MG/DL (ref 8.6–10.4)
CHLORIDE SERPL-SCNC: 104 MMOL/L (ref 98–110)
CHOLEST SERPL-MCNC: 207 MG/DL
CHOLEST/HDLC SERPL: 4.1 (CALC)
CO2 SERPL-SCNC: 32 MMOL/L (ref 20–32)
CREAT SERPL-MCNC: 0.88 MG/DL (ref 0.5–1.05)
EOSINOPHIL # BLD AUTO: 143 CELLS/UL (ref 15–500)
EOSINOPHIL NFR BLD AUTO: 3.1 %
ERYTHROCYTE [DISTWIDTH] IN BLOOD BY AUTOMATED COUNT: 12.9 % (ref 11–15)
GFR/BSA.PRED SERPLBLD CYS-BASED-ARV: 72 ML/MIN/1.73M2
GLOBULIN SER CALC-MCNC: 2.4 G/DL (CALC) (ref 1.9–3.7)
GLUCOSE SERPL-MCNC: 98 MG/DL (ref 65–99)
HCT VFR BLD AUTO: 38.4 % (ref 35–45)
HDLC SERPL-MCNC: 51 MG/DL
HGB BLD-MCNC: 12.4 G/DL (ref 11.7–15.5)
LDLC SERPL CALC-MCNC: 116 MG/DL (CALC)
LYMPHOCYTES # BLD AUTO: 1748 CELLS/UL (ref 850–3900)
LYMPHOCYTES NFR BLD AUTO: 38 %
MCH RBC QN AUTO: 28.6 PG (ref 27–33)
MCHC RBC AUTO-ENTMCNC: 32.3 G/DL (ref 32–36)
MCV RBC AUTO: 88.7 FL (ref 80–100)
MONOCYTES # BLD AUTO: 354 CELLS/UL (ref 200–950)
MONOCYTES NFR BLD AUTO: 7.7 %
NEUTROPHILS # BLD AUTO: 2305 CELLS/UL (ref 1500–7800)
NEUTROPHILS NFR BLD AUTO: 50.1 %
NONHDLC SERPL-MCNC: 156 MG/DL (CALC)
PLATELET # BLD AUTO: 191 THOUSAND/UL (ref 140–400)
PMV BLD REES-ECKER: 11.1 FL (ref 7.5–12.5)
POTASSIUM SERPL-SCNC: 4 MMOL/L (ref 3.5–5.3)
PROT SERPL-MCNC: 6.6 G/DL (ref 6.1–8.1)
RBC # BLD AUTO: 4.33 MILLION/UL (ref 3.8–5.1)
SODIUM SERPL-SCNC: 141 MMOL/L (ref 135–146)
T4 FREE SERPL-MCNC: 1.1 NG/DL (ref 0.8–1.8)
TRIGL SERPL-MCNC: 298 MG/DL
TSH SERPL-ACNC: 5.51 MIU/L (ref 0.4–4.5)
WBC # BLD AUTO: 4.6 THOUSAND/UL (ref 3.8–10.8)

## 2022-09-27 ENCOUNTER — OFFICE VISIT (OUTPATIENT)
Dept: FAMILY MEDICINE CLINIC | Facility: CLINIC | Age: 67
End: 2022-09-27
Payer: COMMERCIAL

## 2022-09-27 VITALS
BODY MASS INDEX: 31.5 KG/M2 | SYSTOLIC BLOOD PRESSURE: 144 MMHG | OXYGEN SATURATION: 97 % | HEART RATE: 64 BPM | RESPIRATION RATE: 16 BRPM | HEIGHT: 66 IN | WEIGHT: 196 LBS | TEMPERATURE: 99.3 F | DIASTOLIC BLOOD PRESSURE: 90 MMHG

## 2022-09-27 DIAGNOSIS — F41.9 ANXIETY DISORDER, UNSPECIFIED TYPE: ICD-10-CM

## 2022-09-27 DIAGNOSIS — I10 ESSENTIAL HYPERTENSION: ICD-10-CM

## 2022-09-27 DIAGNOSIS — Z11.59 NEED FOR HEPATITIS C SCREENING TEST: ICD-10-CM

## 2022-09-27 DIAGNOSIS — F33.0 MILD EPISODE OF RECURRENT MAJOR DEPRESSIVE DISORDER (HCC): ICD-10-CM

## 2022-09-27 DIAGNOSIS — I10 PRIMARY HYPERTENSION: Primary | ICD-10-CM

## 2022-09-27 DIAGNOSIS — E78.2 MIXED HYPERLIPIDEMIA: ICD-10-CM

## 2022-09-27 DIAGNOSIS — E55.9 VITAMIN D DEFICIENCY: ICD-10-CM

## 2022-09-27 DIAGNOSIS — Z23 ENCOUNTER FOR IMMUNIZATION: ICD-10-CM

## 2022-09-27 DIAGNOSIS — F41.1 GENERALIZED ANXIETY DISORDER: ICD-10-CM

## 2022-09-27 DIAGNOSIS — E03.9 ACQUIRED HYPOTHYROIDISM: ICD-10-CM

## 2022-09-27 PROBLEM — F33.2 SEVERE EPISODE OF RECURRENT MAJOR DEPRESSIVE DISORDER, WITHOUT PSYCHOTIC FEATURES (HCC): Status: RESOLVED | Noted: 2022-09-27 | Resolved: 2022-09-27

## 2022-09-27 PROBLEM — E66.811 OBESITY (BMI 30.0-34.9): Status: ACTIVE | Noted: 2018-03-12

## 2022-09-27 PROBLEM — F33.2 SEVERE EPISODE OF RECURRENT MAJOR DEPRESSIVE DISORDER, WITHOUT PSYCHOTIC FEATURES (HCC): Status: ACTIVE | Noted: 2022-09-27

## 2022-09-27 PROBLEM — E66.9 OBESITY (BMI 30.0-34.9): Status: ACTIVE | Noted: 2018-03-12

## 2022-09-27 PROCEDURE — 99215 OFFICE O/P EST HI 40 MIN: CPT | Performed by: FAMILY MEDICINE

## 2022-09-27 PROCEDURE — 1160F RVW MEDS BY RX/DR IN RCRD: CPT | Performed by: FAMILY MEDICINE

## 2022-09-27 PROCEDURE — 90471 IMMUNIZATION ADMIN: CPT

## 2022-09-27 PROCEDURE — 1101F PT FALLS ASSESS-DOCD LE1/YR: CPT | Performed by: FAMILY MEDICINE

## 2022-09-27 PROCEDURE — 3288F FALL RISK ASSESSMENT DOCD: CPT | Performed by: FAMILY MEDICINE

## 2022-09-27 PROCEDURE — 3725F SCREEN DEPRESSION PERFORMED: CPT | Performed by: FAMILY MEDICINE

## 2022-09-27 PROCEDURE — 90662 IIV NO PRSV INCREASED AG IM: CPT

## 2022-09-27 RX ORDER — AMLODIPINE BESYLATE 5 MG/1
5 TABLET ORAL DAILY
Qty: 30 TABLET | Refills: 6 | Status: SHIPPED | OUTPATIENT
Start: 2022-09-27

## 2022-09-27 RX ORDER — LOSARTAN POTASSIUM 100 MG/1
100 TABLET ORAL DAILY
Qty: 30 TABLET | Refills: 6 | Status: SHIPPED | OUTPATIENT
Start: 2022-09-27

## 2022-09-27 RX ORDER — CLONAZEPAM 2 MG/1
2 TABLET ORAL
Qty: 30 TABLET | Refills: 5 | Status: SHIPPED | OUTPATIENT
Start: 2022-09-27

## 2022-09-27 RX ORDER — LEVOTHYROXINE SODIUM 88 UG/1
88 TABLET ORAL DAILY
Qty: 30 TABLET | Refills: 5 | Status: SHIPPED | OUTPATIENT
Start: 2022-09-27

## 2022-09-27 RX ORDER — METOPROLOL SUCCINATE 25 MG/1
25 TABLET, EXTENDED RELEASE ORAL 2 TIMES DAILY
Qty: 60 TABLET | Refills: 6 | Status: SHIPPED | OUTPATIENT
Start: 2022-09-27

## 2022-09-27 NOTE — ASSESSMENT & PLAN NOTE
BP is elevated today  Patient ran out of Amlodipine 5 mg 2 days ago  Rx sent to the pharmacy  Continue Losartan 100 mg daily, Metoprolol ER 25 mg twice daily  Follow a low sodium diet, increase exercise, lose weight  Recommended to check blood pressure at home call with blood pressure log in 2- 3 weeks

## 2022-09-27 NOTE — ASSESSMENT & PLAN NOTE
TSH 5 51  Will increase dose of Levothyroxine to 88 mcg daily  Recheck thyroid function test in 2 months

## 2022-09-27 NOTE — PROGRESS NOTES
Name: Sam Martinez      : 1955      MRN: [id number]  Encounter Provider: Yong Ledesma MD  Encounter Date: 2022   Encounter department: [facility] [address]    Chief Complaint   Patient presents with    Follow-up     Health Maintenance   Topic Date Due    Hepatitis C Screening  Never done    Osteoporosis Screening  Never done    DTaP,Tdap,and Td Vaccines (1 - Tdap) 2013    Breast Cancer Screening: Mammogram  2019    Urinary Incontinence Screening  Never done    COVID-19 Vaccine (2 - Booster for Mee series) 10/09/2021    Fall Risk  2022    BMI: Followup Plan  2023    Annual Physical  2023    Pneumococcal Vaccine: 65+ Years (2 - PPSV23 or PCV20) 2023    BMI: Adult  2023    Depression Remission PHQ  2023    Colorectal Cancer Screening  2031    Influenza Vaccine  Completed    HIB Vaccine  Aged Out    Hepatitis B Vaccine  Aged Out    IPV Vaccine  Aged Out    Hepatitis A Vaccine  Aged Out    Meningococcal ACWY Vaccine  Aged Out    HPV Vaccine  Aged Out       Assessment & Plan     1  Primary hypertension  Assessment & Plan:  BP is elevated today  Patient ran out of Amlodipine 5 mg 2 days ago  Rx sent to the pharmacy  Continue Losartan 100 mg daily, Metoprolol ER 25 mg twice daily  Follow a low sodium diet, increase exercise, lose weight  Recommended to check blood pressure at home call with blood pressure log in 2- 3 weeks  2  Acquired hypothyroidism  Assessment & Plan:  TSH 5 51  Will increase dose of Levothyroxine to 88 mcg daily  Recheck thyroid function test in 2 months  Orders:  -     levothyroxine (Synthroid) 88 mcg tablet; Take 1 tablet (88 mcg total) by mouth daily  -     TSH, 3rd generation with Free T4 reflex; Future; Expected date: 2022  -     TSH, 3rd generation with Free T4 reflex    3   Mixed hyperlipidemia  Assessment & Plan:  Worsening lipid panel since last visit in February 2022  Discussed dietary and lifestyle modifications with patient  Recommended to follow low-cholesterol, low-fat diet, increase exercise  Avoid alcohol  Continue Atorvastatin 40 mg daily  Will recheck lipids in 6 months  Orders:  -     Triglycerides; Future; Expected date: 11/27/2022  -     Triglycerides    4  Mild episode of recurrent major depressive disorder (HCC)  Assessment & Plan:  PHQ 9 score 5  Mood has been stable  Patient reports having less stress since retired 1 month ago  Continue Citalopram 40 mg daily, Lamictal 200 mg daily  5  Generalized anxiety disorder  Assessment & Plan:  Symptoms improved  Continue Citalopram 40 mg daily, Klonopin 2 mg at bedtime  6  Vitamin D deficiency  Assessment & Plan:  Continue Vit D 50,000 IU one caps  weekly  Check Vit D 25 OH  Orders:  -     Vitamin D 25 hydroxy; Future; Expected date: 11/27/2022  -     Vitamin D 25 hydroxy    7  Need for hepatitis C screening test  -     Hepatitis C Antibody ([facility]); Future; Expected date: 11/27/2022    8  Encounter for immunization  -     influenza vaccine, high-dose, PF 0 7 mL (FLUZONE HIGH-DOSE)    9  Essential hypertension  -     amLODIPine (NORVASC) 5 mg tablet; Take 1 tablet (5 mg total) by mouth daily  -     metoprolol succinate (TOPROL-XL) 25 mg 24 hr tablet; Take 1 tablet (25 mg total) by mouth 2 (two) times a day  -     losartan (COZAAR) 100 MG tablet; Take 1 tablet (100 mg total) by mouth daily    10  Anxiety disorder, unspecified type  -     clonazePAM (KlonoPIN) 2 mg tablet; Take 1 tablet (2 mg total) by mouth daily at bedtime      HM:  recommended to schedule screening mammogram, DEXA scan as ordered in February 2022  Schedule follow-up visit in 6 months  Subjective      HPI     Patient presents for 6 month office visit  PMHx: HTN, Hyperlipidemia, Obesity, Hypothyroidism, Depression, Anxiety, Vit D deficiency       Reviewed current medications, blood test results September 26, 2022  TSH 5 51  Cholesterol 207, HDL 51, triglycerides 298,    Glucose 98, creatinine 0 88, potassium 4 0  LFTs - within normal range  Patient retired 1 month ago, reports having less stress  She is currently baby-sitting her 4 month -old grandchild  Patient gained 10 lb since last visit in 2/2022  Admits to not following a healthy diet  She plans to change her diet and work on weight loss  HTN - patient ran out of Amlodipine 5 mg 2 days ago  She takes Losartan 100 mg daily, Metoprolol ER 25 mg twice daily  Denies chest pain, shortness of breath, dizziness  Hypothyroidism - currently taking Levothyroxine 75 mcg daily  Hyperlipidemia - on Atorvastatin 40 mg daily  Reports no side effects from statin therapy  Anxiety / Depression - mood has been stable  Patient takes citalopram 40 mg daily, Lamictal 200 mg daily,  Klonopin 2 mg at bedtime  Patient did not schedule screening mammogram, DEXA scan as ordered February 2022  Colonoscopy done in 6/2021 by colorectal surgeon Dr Salome Curiel who recommended to repeat colonoscopy in 10 years  Review of Systems   Constitutional: Positive for fatigue  Negative for activity change, appetite change, chills and fever  HENT: Negative for congestion, ear pain, hearing loss, nosebleeds, sore throat and trouble swallowing  Eyes: Negative  Respiratory: Negative for cough, chest tightness, shortness of breath and wheezing  Cardiovascular: Negative for chest pain, palpitations and leg swelling  Gastrointestinal: Negative for abdominal pain, blood in stool, constipation, diarrhea, nausea and vomiting  Genitourinary: Negative for difficulty urinating, dysuria, flank pain and hematuria  Musculoskeletal: Positive for arthralgias and back pain (occasional )  Negative for gait problem and joint swelling  Skin: Negative for rash     Neurological: Negative for dizziness, syncope and headaches  Hematological: Negative  Psychiatric/Behavioral: Negative for sleep disturbance and suicidal ideas  Anxiety / Depression - mood has been stable       Current Outpatient Medications on File Prior to Visit   Medication Sig    atorvastatin (LIPITOR) 40 mg tablet take 1 tablet by mouth once daily    Biotin 5 MG TABS Take 1 tab  daily    citalopram (CeleXA) 40 mg tablet TAKE 1 TABLET BY MOUTH ONCE DAILY    ergocalciferol (VITAMIN D2) 50,000 units TAKE 1 CAPSULE BY MOUTH ONCE A WEEK    lamoTRIgine (LaMICtal) 200 MG tablet take 1 tablet by mouth once daily    Magnesium 400 MG CAPS Take 1 caps  daily    methocarbamol (ROBAXIN) 500 mg tablet Take 1 tab  every 8 hr  PRN for back pain/ muscle spasms    naproxen (NAPROSYN) 375 mg tablet Take 1 tablet (375 mg total) by mouth 2 (two) times a day with meals    [DISCONTINUED] amLODIPine (NORVASC) 5 mg tablet TAKE 1 TABLET (5 MG TOTAL) BY MOUTH DAILY    [DISCONTINUED] clonazePAM (KlonoPIN) 2 mg tablet take 1 tablet by mouth at bedtime    [DISCONTINUED] levothyroxine 75 mcg tablet TAKE 1 TABLET (75 MCG TOTAL) BY MOUTH DAILY IN THE EARLY MORNING    [DISCONTINUED] losartan (COZAAR) 100 MG tablet take 1 tablet by mouth once daily    [DISCONTINUED] metoprolol succinate (TOPROL-XL) 25 mg 24 hr tablet take 1 tablet by mouth twice a day    [DISCONTINUED] predniSONE 20 mg tablet Take 2 tabs daily with food for 5 days       Objective     /90 (BP Location: Left arm, Patient Position: Sitting, Cuff Size: Standard)   Pulse 64   Temp 99 3 °F (37 4 °C)   Resp 16   Ht 5' 6" (1 676 m)   Wt 88 9 kg (196 lb)   SpO2 97%   BMI 31 64 kg/m²     Physical Exam  Vitals and nursing note reviewed  Constitutional:       Appearance: Normal appearance  She is obese  HENT:      Head: Normocephalic and atraumatic  Eyes:      Conjunctiva/sclera: Conjunctivae normal       Pupils: Pupils are equal, round, and reactive to light     Neck:      Vascular: No carotid bruit  Cardiovascular:      Rate and Rhythm: Normal rate and regular rhythm  Heart sounds: No murmur heard  Pulmonary:      Effort: Pulmonary effort is normal       Breath sounds: Normal breath sounds  Abdominal:      General: Bowel sounds are normal  There is no distension  Palpations: Abdomen is soft  Tenderness: There is no abdominal tenderness  Musculoskeletal:         General: No swelling or tenderness  Normal range of motion  Cervical back: Normal range of motion and neck supple  Right lower leg: No edema  Left lower leg: No edema  Skin:     General: Skin is warm and dry  Findings: No rash  Neurological:      Mental Status: She is alert     Psychiatric:         Mood and Affect: Mood normal        Troy Painter MD

## 2022-09-27 NOTE — ASSESSMENT & PLAN NOTE
Worsening lipid panel since last visit in February 2022  Discussed dietary and lifestyle modifications with patient  Recommended to follow low-cholesterol, low-fat diet, increase exercise  Avoid alcohol  Continue Atorvastatin 40 mg daily  Will recheck lipids in 6 months

## 2022-09-27 NOTE — ASSESSMENT & PLAN NOTE
Patient gained 10 lb since last visit in February 2022  Recommended to work dietary and lifestyle modifications, lose weight

## 2022-09-27 NOTE — ASSESSMENT & PLAN NOTE
PHQ 9 score 5  Mood has been stable  Patient reports having less stress since retired 1 month ago  Continue Citalopram 40 mg daily, Lamictal 200 mg daily

## 2022-12-21 DIAGNOSIS — F41.8 DEPRESSION WITH ANXIETY: ICD-10-CM

## 2022-12-21 RX ORDER — CITALOPRAM 40 MG/1
TABLET ORAL
Qty: 30 TABLET | Refills: 6 | Status: SHIPPED | OUTPATIENT
Start: 2022-12-21

## 2023-01-31 DIAGNOSIS — E78.2 MIXED HYPERLIPIDEMIA: ICD-10-CM

## 2023-01-31 RX ORDER — ATORVASTATIN CALCIUM 40 MG/1
TABLET, FILM COATED ORAL
Qty: 30 TABLET | Refills: 6 | Status: SHIPPED | OUTPATIENT
Start: 2023-01-31 | End: 2023-02-10

## 2023-02-10 DIAGNOSIS — E78.2 MIXED HYPERLIPIDEMIA: ICD-10-CM

## 2023-02-10 RX ORDER — ATORVASTATIN CALCIUM 40 MG/1
TABLET, FILM COATED ORAL
Qty: 30 TABLET | Refills: 6 | Status: SHIPPED | OUTPATIENT
Start: 2023-02-10

## 2023-03-30 NOTE — TELEPHONE ENCOUNTER
Please mail a letter to patient that she needs to schedule additional imaging from screening mammogram  Ipledge Number (Optional): [id number] Ipledge Number (Optional): [id number]

## 2023-05-01 DIAGNOSIS — F41.8 DEPRESSION WITH ANXIETY: ICD-10-CM

## 2023-05-01 DIAGNOSIS — E03.9 ACQUIRED HYPOTHYROIDISM: ICD-10-CM

## 2023-05-02 RX ORDER — LEVOTHYROXINE SODIUM 88 UG/1
TABLET ORAL
Qty: 30 TABLET | Refills: 5 | Status: SHIPPED | OUTPATIENT
Start: 2023-05-02

## 2023-05-02 RX ORDER — LAMOTRIGINE 200 MG/1
TABLET ORAL
Qty: 30 TABLET | Refills: 6 | Status: SHIPPED | OUTPATIENT
Start: 2023-05-02

## 2023-06-06 DIAGNOSIS — I10 ESSENTIAL HYPERTENSION: ICD-10-CM

## 2023-06-06 RX ORDER — METOPROLOL SUCCINATE 25 MG/1
TABLET, EXTENDED RELEASE ORAL
Qty: 60 TABLET | Refills: 6 | Status: SHIPPED | OUTPATIENT
Start: 2023-06-06

## 2023-06-06 RX ORDER — AMLODIPINE BESYLATE 5 MG/1
TABLET ORAL
Qty: 30 TABLET | Refills: 6 | Status: SHIPPED | OUTPATIENT
Start: 2023-06-06

## 2023-06-08 ENCOUNTER — TELEPHONE (OUTPATIENT)
Dept: FAMILY MEDICINE CLINIC | Facility: CLINIC | Age: 68
End: 2023-06-08

## 2023-06-08 NOTE — TELEPHONE ENCOUNTER
Left message with patient to set up med follow up and a Medicare wellness visit  Can see Lenny Balderrama if she would like

## 2023-06-08 NOTE — TELEPHONE ENCOUNTER
----- Message from Isis Ross sent at 6/7/2023  8:36 AM EDT -----  Called patient left message for her to call back and schedule appointment   ----- Message -----  From: Lore Mcgee MD  Sent: 6/6/2023   9:51 PM EDT  To: Isis Ross    Rx sent to pharmacy for Amlodipine and Metoprolol  Follow-up visit was cancelled in 3/2023  Please contact patient to schedule follow-up visit  She can see PRISCILLA Ledezma

## 2023-06-26 ENCOUNTER — TELEPHONE (OUTPATIENT)
Dept: FAMILY MEDICINE CLINIC | Facility: CLINIC | Age: 68
End: 2023-06-26

## 2023-06-26 DIAGNOSIS — E03.9 ACQUIRED HYPOTHYROIDISM: ICD-10-CM

## 2023-06-26 DIAGNOSIS — I10 PRIMARY HYPERTENSION: Primary | ICD-10-CM

## 2023-06-26 DIAGNOSIS — E78.2 MIXED HYPERLIPIDEMIA: ICD-10-CM

## 2023-06-26 NOTE — TELEPHONE ENCOUNTER
Patient ([phone]) rescheduled follow up for 23 with Agapito Garza  Last lab work that was ordered for patient has   Please confirm if labs should be completed prior to appointment and place updated orders if needed  Patient requests call back to confirm

## 2023-07-03 LAB
ALBUMIN SERPL-MCNC: 4.2 G/DL (ref 3.6–5.1)
ALBUMIN/GLOB SERPL: 1.6 (CALC) (ref 1–2.5)
ALP SERPL-CCNC: 70 U/L (ref 37–153)
ALT SERPL-CCNC: 13 U/L (ref 6–29)
AST SERPL-CCNC: 12 U/L (ref 10–35)
BILIRUB SERPL-MCNC: 0.6 MG/DL (ref 0.2–1.2)
BUN SERPL-MCNC: 20 MG/DL (ref 7–25)
BUN/CREAT SERPL: NORMAL (CALC) (ref 6–22)
CALCIUM SERPL-MCNC: 9 MG/DL (ref 8.6–10.4)
CHLORIDE SERPL-SCNC: 103 MMOL/L (ref 98–110)
CHOLEST SERPL-MCNC: 191 MG/DL
CHOLEST/HDLC SERPL: 3.9 (CALC)
CO2 SERPL-SCNC: 28 MMOL/L (ref 20–32)
CREAT SERPL-MCNC: 0.93 MG/DL (ref 0.5–1.05)
GFR/BSA.PRED SERPLBLD CYS-BASED-ARV: 67 ML/MIN/1.73M2
GLOBULIN SER CALC-MCNC: 2.6 G/DL (CALC) (ref 1.9–3.7)
GLUCOSE SERPL-MCNC: 91 MG/DL (ref 65–99)
HDLC SERPL-MCNC: 49 MG/DL
LDLC SERPL CALC-MCNC: 105 MG/DL (CALC)
NONHDLC SERPL-MCNC: 142 MG/DL (CALC)
POTASSIUM SERPL-SCNC: 4 MMOL/L (ref 3.5–5.3)
PROT SERPL-MCNC: 6.8 G/DL (ref 6.1–8.1)
SODIUM SERPL-SCNC: 139 MMOL/L (ref 135–146)
T4 FREE SERPL-MCNC: 1 NG/DL (ref 0.8–1.8)
TRIGL SERPL-MCNC: 247 MG/DL
TSH SERPL-ACNC: 6.09 MIU/L (ref 0.4–4.5)

## 2023-07-21 DIAGNOSIS — I10 ESSENTIAL HYPERTENSION: ICD-10-CM

## 2023-07-21 DIAGNOSIS — E03.9 ACQUIRED HYPOTHYROIDISM: ICD-10-CM

## 2023-07-21 RX ORDER — METOPROLOL SUCCINATE 25 MG/1
TABLET, EXTENDED RELEASE ORAL
Qty: 180 TABLET | Refills: 6 | Status: SHIPPED | OUTPATIENT
Start: 2023-07-21

## 2023-07-21 RX ORDER — LEVOTHYROXINE SODIUM 88 UG/1
TABLET ORAL
Qty: 90 TABLET | Refills: 5 | Status: SHIPPED | OUTPATIENT
Start: 2023-07-21

## 2023-08-23 DIAGNOSIS — E78.2 MIXED HYPERLIPIDEMIA: ICD-10-CM

## 2023-08-23 DIAGNOSIS — F41.8 DEPRESSION WITH ANXIETY: ICD-10-CM

## 2023-08-23 RX ORDER — ATORVASTATIN CALCIUM 40 MG/1
40 TABLET, FILM COATED ORAL DAILY
Qty: 30 TABLET | Refills: 5 | Status: SHIPPED | OUTPATIENT
Start: 2023-08-23

## 2023-08-23 RX ORDER — CITALOPRAM 40 MG/1
40 TABLET ORAL DAILY
Qty: 30 TABLET | Refills: 5 | Status: SHIPPED | OUTPATIENT
Start: 2023-08-23

## 2023-08-29 ENCOUNTER — OFFICE VISIT (OUTPATIENT)
Dept: FAMILY MEDICINE CLINIC | Facility: CLINIC | Age: 68
End: 2023-08-29
Payer: COMMERCIAL

## 2023-08-29 VITALS
RESPIRATION RATE: 14 BRPM | HEART RATE: 66 BPM | OXYGEN SATURATION: 95 % | TEMPERATURE: 97.5 F | WEIGHT: 188 LBS | HEIGHT: 66 IN | BODY MASS INDEX: 30.22 KG/M2 | DIASTOLIC BLOOD PRESSURE: 84 MMHG | SYSTOLIC BLOOD PRESSURE: 132 MMHG

## 2023-08-29 DIAGNOSIS — Z12.31 ENCOUNTER FOR SCREENING MAMMOGRAM FOR BREAST CANCER: ICD-10-CM

## 2023-08-29 DIAGNOSIS — Z11.59 NEED FOR HEPATITIS C SCREENING TEST: ICD-10-CM

## 2023-08-29 DIAGNOSIS — Z00.00 WELCOME TO MEDICARE PREVENTIVE VISIT: Primary | ICD-10-CM

## 2023-08-29 DIAGNOSIS — I10 PRIMARY HYPERTENSION: ICD-10-CM

## 2023-08-29 DIAGNOSIS — E78.2 MIXED HYPERLIPIDEMIA: ICD-10-CM

## 2023-08-29 DIAGNOSIS — Z78.0 POSTMENOPAUSAL: ICD-10-CM

## 2023-08-29 DIAGNOSIS — F41.1 GENERALIZED ANXIETY DISORDER: ICD-10-CM

## 2023-08-29 DIAGNOSIS — E03.9 ACQUIRED HYPOTHYROIDISM: ICD-10-CM

## 2023-08-29 DIAGNOSIS — Z23 ENCOUNTER FOR IMMUNIZATION: ICD-10-CM

## 2023-08-29 DIAGNOSIS — F33.0 MILD EPISODE OF RECURRENT MAJOR DEPRESSIVE DISORDER (HCC): ICD-10-CM

## 2023-08-29 PROCEDURE — 93000 ELECTROCARDIOGRAM COMPLETE: CPT | Performed by: NURSE PRACTITIONER

## 2023-08-29 PROCEDURE — G0438 PPPS, INITIAL VISIT: HCPCS | Performed by: NURSE PRACTITIONER

## 2023-08-29 PROCEDURE — 90715 TDAP VACCINE 7 YRS/> IM: CPT

## 2023-08-29 PROCEDURE — 90471 IMMUNIZATION ADMIN: CPT

## 2023-08-29 NOTE — PATIENT INSTRUCTIONS
Medicare Preventive Visit Patient Instructions  Thank you for completing your Welcome to Medicare Visit or Medicare Annual Wellness Visit today. Your next wellness visit will be due in one year (8/29/2024). The screening/preventive services that you may require over the next 5-10 years are detailed below. Some tests may not apply to you based off risk factors and/or age. Screening tests ordered at today's visit but not completed yet may show as past due. Also, please note that scanned in results may not display below. Preventive Screenings:  Service Recommendations Previous Testing/Comments   Colorectal Cancer Screening  * Colonoscopy    * Fecal Occult Blood Test (FOBT)/Fecal Immunochemical Test (FIT)  * Fecal DNA/Cologuard Test  * Flexible Sigmoidoscopy Age: 43-73 years old   Colonoscopy: every 10 years (may be performed more frequently if at higher risk)  OR  FOBT/FIT: every 1 year  OR  Cologuard: every 3 years  OR  Sigmoidoscopy: every 5 years  Screening may be recommended earlier than age 39 if at higher risk for colorectal cancer. Also, an individualized decision between you and your healthcare provider will decide whether screening between the ages of 77-80 would be appropriate. Colonoscopy: 06/18/2021  FOBT/FIT: Not on file  Cologuard: Not on file  Sigmoidoscopy: Not on file          Breast Cancer Screening Age: 36 years old  Frequency: every 1-2 years  Not required if history of left and right mastectomy Mammogram: 07/18/2018        Cervical Cancer Screening Between the ages of 21-29, pap smear recommended once every 3 years. Between the ages of 32-69, can perform pap smear with HPV co-testing every 5 years.    Recommendations may differ for women with a history of total hysterectomy, cervical cancer, or abnormal pap smears in past. Pap Smear: Not on file        Hepatitis C Screening Once for adults born between [address] [place] [address]  More frequently in patients at high risk for Hepatitis C Hep C Antibody: Not on file        Diabetes Screening 1-2 times per year if you're at risk for diabetes or have pre-diabetes Fasting glucose: No results in last 5 years (No results in last 5 years)  A1C: No results in last 5 years (No results in last 5 years)      Cholesterol Screening Once every 5 years if you don't have a lipid disorder. May order more often based on risk factors. Lipid panel: 07/03/2023          Other Preventive Screenings Covered by Medicare:  1. Abdominal Aortic Aneurysm (AAA) Screening: covered once if your at risk. You're considered to be at risk if you have a family history of AAA. 2. Lung Cancer Screening: covers low dose CT scan once per year if you meet all of the following conditions: (1) Age 48-67; (2) No signs or symptoms of lung cancer; (3) Current smoker or have quit smoking within the last 15 years; (4) You have a tobacco smoking history of at least 20 pack years (packs per day multiplied by number of years you smoked); (5) You get a written order from a healthcare provider. 3. Glaucoma Screening: covered annually if you're considered high risk: (1) You have diabetes OR (2) Family history of glaucoma OR (3)  aged 48 and older OR (3)  American aged 72 and older  3. Osteoporosis Screening: covered every 2 years if you meet one of the following conditions: (1) You're estrogen deficient and at risk for osteoporosis based off medical history and other findings; (2) Have a vertebral abnormality; (3) On glucocorticoid therapy for more than 3 months; (4) Have primary hyperparathyroidism; (5) On osteoporosis medications and need to assess response to drug therapy. · Last bone density test (DXA Scan): Not on file. 5. HIV Screening: covered annually if you're between the age of 14-79. Also covered annually if you are younger than 13 and older than 72 with risk factors for HIV infection. For pregnant patients, it is covered up to 3 times per pregnancy.     Immunizations:  Immunization Recommendations   Influenza Vaccine Annual influenza vaccination during flu season is recommended for all persons aged >= 6 months who do not have contraindications   Pneumococcal Vaccine   * Pneumococcal conjugate vaccine = PCV13 (Prevnar 13), PCV15 (Vaxneuvance), PCV20 (Prevnar 20)  * Pneumococcal polysaccharide vaccine = PPSV23 (Pneumovax) Adults 20-63 years old: 1-3 doses may be recommended based on certain risk factors  Adults 72 years old: 1-2 doses may be recommended based off what pneumonia vaccine you previously received   Hepatitis B Vaccine 3 dose series if at intermediate or high risk (ex: diabetes, end stage renal disease, liver disease)   Tetanus (Td) Vaccine - COST NOT COVERED BY MEDICARE PART B Following completion of primary series, a booster dose should be given every 10 years to maintain immunity against tetanus. Td may also be given as tetanus wound prophylaxis. Tdap Vaccine - COST NOT COVERED BY MEDICARE PART B Recommended at least once for all adults. For pregnant patients, recommended with each pregnancy. Shingles Vaccine (Shingrix) - COST NOT COVERED BY MEDICARE PART B  2 shot series recommended in those aged 48 and above     Health Maintenance Due:      Topic Date Due   • Hepatitis C Screening  Never done   • Breast Cancer Screening: Mammogram  07/18/2019   • Colorectal Cancer Screening  06/16/2031     Immunizations Due:      Topic Date Due   • COVID-19 Vaccine (2 - Booster for Mee series) 10/09/2021   • Pneumococcal Vaccine: 65+ Years (3 - PPSV23 if available, else PCV20) 02/09/2023   • Influenza Vaccine (1) 09/01/2023     Advance Directives   What are advance directives? Advance directives are legal documents that state your wishes and plans for medical care. These plans are made ahead of time in case you lose your ability to make decisions for yourself. Advance directives can apply to any medical decision, such as the treatments you want, and if you want to donate organs.    What are the types of advance directives? There are many types of advance directives, and each state has rules about how to use them. You may choose a combination of any of the following:  · Living will: This is a written record of the treatment you want. You can also choose which treatments you do not want, which to limit, and which to stop at a certain time. This includes surgery, medicine, IV fluid, and tube feedings. · Durable power of  for [facility]): This is a written record that states who you want to make healthcare choices for you when you are unable to make them for yourself. This person, called a proxy, is usually a family member or a friend. You may choose more than 1 proxy. · Do not resuscitate (DNR) order:  A DNR order is used in case your heart stops beating or you stop breathing. It is a request not to have certain forms of treatment, such as CPR. A DNR order may be included in other types of advance directives. · Medical directive: This covers the care that you want if you are in a coma, near death, or unable to make decisions for yourself. You can list the treatments you want for each condition. Treatment may include pain medicine, surgery, blood transfusions, dialysis, IV or tube feedings, and a ventilator (breathing machine). · Values history: This document has questions about your views, beliefs, and how you feel and think about life. This information can help others choose the care that you would choose. Why are advance directives important? An advance directive helps you control your care. Although spoken wishes may be used, it is better to have your wishes written down. Spoken wishes can be misunderstood, or not followed. Treatments may be given even if you do not want them. An advance directive may make it easier for your family to make difficult choices about your care.    Weight Management   Why it is important to manage your weight:  Being overweight increases your risk of health conditions such as heart disease, high blood pressure, type 2 diabetes, and certain types of cancer. It can also increase your risk for osteoarthritis, sleep apnea, and other respiratory problems. Aim for a slow, steady weight loss. Even a small amount of weight loss can lower your risk of health problems. How to lose weight safely:  A safe and healthy way to lose weight is to eat fewer calories and get regular exercise. You can lose up about 1 pound a week by decreasing the number of calories you eat by 500 calories each day. Healthy meal plan for weight management:  A healthy meal plan includes a variety of foods, contains fewer calories, and helps you stay healthy. A healthy meal plan includes the following:  · Eat whole-grain foods more often. A healthy meal plan should contain fiber. Fiber is the part of grains, fruits, and vegetables that is not broken down by your body. Whole-grain foods are healthy and provide extra fiber in your diet. Some examples of whole-grain foods are whole-wheat breads and pastas, oatmeal, brown rice, and bulgur. · Eat a variety of vegetables every day. Include dark, leafy greens such as spinach, kale, sophie greens, and mustard greens. Eat yellow and orange vegetables such as carrots, sweet potatoes, and winter squash. · Eat a variety of fruits every day. Choose fresh or canned fruit (canned in its own juice or light syrup) instead of juice. Fruit juice has very little or no fiber. · Eat low-fat dairy foods. Drink fat-free (skim) milk or 1% milk. Eat fat-free yogurt and low-fat cottage cheese. Try low-fat cheeses such as mozzarella and other reduced-fat cheeses. · Choose meat and other protein foods that are low in fat. Choose beans or other legumes such as split peas or lentils. Choose fish, skinless poultry (chicken or turkey), or lean cuts of red meat (beef or pork). Before you cook meat or poultry, cut off any visible fat. · Use less fat and oil. Try baking foods instead of frying them. Add less fat, such as margarine, sour cream, regular salad dressing and mayonnaise to foods. Eat fewer high-fat foods. Some examples of high-fat foods include french fries, doughnuts, ice cream, and cakes. · Eat fewer sweets. Limit foods and drinks that are high in sugar. This includes candy, cookies, regular soda, and sweetened drinks. Exercise:  Exercise at least 30 minutes per day on most days of the week. Some examples of exercise include walking, biking, dancing, and swimming. You can also fit in more physical activity by taking the stairs instead of the elevator or parking farther away from stores. Ask your healthcare provider about the best exercise plan for you. © Copyright [employer] 2018 Information is for End User's use only and may not be sold, redistributed or otherwise used for commercial purposes. All illustrations and images included in CareNotes® are the copyrighted property of [employer]. or [facility] Preventive Visit Patient Instructions  Thank you for completing your Welcome to Medicare Visit or Medicare Annual Wellness Visit today. Your next wellness visit will be due in one year (8/29/2024). The screening/preventive services that you may require over the next 5-10 years are detailed below. Some tests may not apply to you based off risk factors and/or age. Screening tests ordered at today's visit but not completed yet may show as past due. Also, please note that scanned in results may not display below.   Preventive Screenings:  Service Recommendations Previous Testing/Comments   Colorectal Cancer Screening  * Colonoscopy    * Fecal Occult Blood Test (FOBT)/Fecal Immunochemical Test (FIT)  * Fecal DNA/Cologuard Test  * Flexible Sigmoidoscopy Age: 43-73 years old   Colonoscopy: every 10 years (may be performed more frequently if at higher risk)  OR  FOBT/FIT: every 1 year  OR  Cologuard: every 3 years  OR  Sigmoidoscopy: every 5 years  Screening may be recommended earlier than age 39 if at higher risk for colorectal cancer. Also, an individualized decision between you and your healthcare provider will decide whether screening between the ages of 77-80 would be appropriate. Colonoscopy: 06/18/2021  FOBT/FIT: Not on file  Cologuard: Not on file  Sigmoidoscopy: Not on file    Screening Current     Breast Cancer Screening Age: 36 years old  Frequency: every 1-2 years  Not required if history of left and right mastectomy Mammogram: 07/18/2018        Cervical Cancer Screening Between the ages of 21-29, pap smear recommended once every 3 years. Between the ages of 32-69, can perform pap smear with HPV co-testing every 5 years. Recommendations may differ for women with a history of total hysterectomy, cervical cancer, or abnormal pap smears in past. Pap Smear: Not on file    Screening Not Indicated   Hepatitis C Screening Once for adults born between 1945 and 1965  More frequently in patients at high risk for Hepatitis C Hep C Antibody: Not on file        Diabetes Screening 1-2 times per year if you're at risk for diabetes or have pre-diabetes Fasting glucose: No results in last 5 years (No results in last 5 years)  A1C: No results in last 5 years (No results in last 5 years)  Screening Current   Cholesterol Screening Once every 5 years if you don't have a lipid disorder. May order more often based on risk factors. Lipid panel: 07/03/2023    Screening Not Indicated  History Lipid Disorder     Other Preventive Screenings Covered by Medicare:  6. Abdominal Aortic Aneurysm (AAA) Screening: covered once if your at risk. You're considered to be at risk if you have a family history of AAA.   7. Lung Cancer Screening: covers low dose CT scan once per year if you meet all of the following conditions: (1) Age 48-67; (2) No signs or symptoms of lung cancer; (3) Current smoker or have quit smoking within the last 15 years; (4) You have a tobacco smoking history of at least 20 pack years (packs per day multiplied by number of years you smoked); (5) You get a written order from a healthcare provider. 8. Glaucoma Screening: covered annually if you're considered high risk: (1) You have diabetes OR (2) Family history of glaucoma OR (3)  aged 48 and older OR (3)  American aged 72 and older  5. Osteoporosis Screening: covered every 2 years if you meet one of the following conditions: (1) You're estrogen deficient and at risk for osteoporosis based off medical history and other findings; (2) Have a vertebral abnormality; (3) On glucocorticoid therapy for more than 3 months; (4) Have primary hyperparathyroidism; (5) On osteoporosis medications and need to assess response to drug therapy. · Last bone density test (DXA Scan): Not on file. 10. HIV Screening: covered annually if you're between the age of 14-79. Also covered annually if you are younger than 13 and older than 72 with risk factors for HIV infection. For pregnant patients, it is covered up to 3 times per pregnancy. Immunizations:  Immunization Recommendations   Influenza Vaccine Annual influenza vaccination during flu season is recommended for all persons aged >= 6 months who do not have contraindications   Pneumococcal Vaccine   * Pneumococcal conjugate vaccine = PCV13 (Prevnar 13), PCV15 (Vaxneuvance), PCV20 (Prevnar 20)  * Pneumococcal polysaccharide vaccine = PPSV23 (Pneumovax) Adults 20-63 years old: 1-3 doses may be recommended based on certain risk factors  Adults 72 years old: 1-2 doses may be recommended based off what pneumonia vaccine you previously received   Hepatitis B Vaccine 3 dose series if at intermediate or high risk (ex: diabetes, end stage renal disease, liver disease)   Tetanus (Td) Vaccine - COST NOT COVERED BY MEDICARE PART B Following completion of primary series, a booster dose should be given every 10 years to maintain immunity against tetanus.  Td may also be given as tetanus wound prophylaxis. Tdap Vaccine - COST NOT COVERED BY MEDICARE PART B Recommended at least once for all adults. For pregnant patients, recommended with each pregnancy. Shingles Vaccine (Shingrix) - COST NOT COVERED BY MEDICARE PART B  2 shot series recommended in those aged 48 and above     Health Maintenance Due:      Topic Date Due   • Hepatitis C Screening  Never done   • Breast Cancer Screening: Mammogram  07/18/2019   • Colorectal Cancer Screening  06/16/2031     Immunizations Due:      Topic Date Due   • COVID-19 Vaccine (2 - Booster for Mee series) 10/09/2021   • Pneumococcal Vaccine: 65+ Years (3 - PPSV23 if available, else PCV20) 02/09/2023   • Influenza Vaccine (1) 09/01/2023     Advance Directives   What are advance directives? Advance directives are legal documents that state your wishes and plans for medical care. These plans are made ahead of time in case you lose your ability to make decisions for yourself. Advance directives can apply to any medical decision, such as the treatments you want, and if you want to donate organs. What are the types of advance directives? There are many types of advance directives, and each state has rules about how to use them. You may choose a combination of any of the following:  · Living will: This is a written record of the treatment you want. You can also choose which treatments you do not want, which to limit, and which to stop at a certain time. This includes surgery, medicine, IV fluid, and tube feedings. · Durable power of  for healthcare [facility]): This is a written record that states who you want to make healthcare choices for you when you are unable to make them for yourself. This person, called a proxy, is usually a family member or a friend. You may choose more than 1 proxy. · Do not resuscitate (DNR) order:  A DNR order is used in case your heart stops beating or you stop breathing.  It is a request not to have certain forms of treatment, such as CPR. A DNR order may be included in other types of advance directives. · Medical directive: This covers the care that you want if you are in a coma, near death, or unable to make decisions for yourself. You can list the treatments you want for each condition. Treatment may include pain medicine, surgery, blood transfusions, dialysis, IV or tube feedings, and a ventilator (breathing machine). · Values history: This document has questions about your views, beliefs, and how you feel and think about life. This information can help others choose the care that you would choose. Why are advance directives important? An advance directive helps you control your care. Although spoken wishes may be used, it is better to have your wishes written down. Spoken wishes can be misunderstood, or not followed. Treatments may be given even if you do not want them. An advance directive may make it easier for your family to make difficult choices about your care. Fall Prevention    Fall prevention  includes ways to make your home and other areas safer. It also includes ways you can move more carefully to prevent a fall. Health conditions that cause changes in your blood pressure, vision, or muscle strength and coordination may increase your risk for falls. Medicines may also increase your risk for falls if they make you dizzy, weak, or sleepy. Fall prevention tips:   · Stand or sit up slowly. · Use assistive devices as directed. · Wear shoes that fit well and have soles that . · Wear a personal alarm. · Stay active. · Manage your medical conditions. Home Safety Tips:  · Add items to prevent falls in the bathroom. · Keep paths clear. · Install bright lights in your home. · Keep items you use often on shelves within reach. · Paint or place reflective tape on the edges of your stairs.     Weight Management   Why it is important to manage your weight:  Being overweight increases your risk of health conditions such as heart disease, high blood pressure, type 2 diabetes, and certain types of cancer. It can also increase your risk for osteoarthritis, sleep apnea, and other respiratory problems. Aim for a slow, steady weight loss. Even a small amount of weight loss can lower your risk of health problems. How to lose weight safely:  A safe and healthy way to lose weight is to eat fewer calories and get regular exercise. You can lose up about 1 pound a week by decreasing the number of calories you eat by 500 calories each day. Healthy meal plan for weight management:  A healthy meal plan includes a variety of foods, contains fewer calories, and helps you stay healthy. A healthy meal plan includes the following:  · Eat whole-grain foods more often. A healthy meal plan should contain fiber. Fiber is the part of grains, fruits, and vegetables that is not broken down by your body. Whole-grain foods are healthy and provide extra fiber in your diet. Some examples of whole-grain foods are whole-wheat breads and pastas, oatmeal, brown rice, and bulgur. · Eat a variety of vegetables every day. Include dark, leafy greens such as spinach, kale, sophie greens, and mustard greens. Eat yellow and orange vegetables such as carrots, sweet potatoes, and winter squash. · Eat a variety of fruits every day. Choose fresh or canned fruit (canned in its own juice or light syrup) instead of juice. Fruit juice has very little or no fiber. · Eat low-fat dairy foods. Drink fat-free (skim) milk or 1% milk. Eat fat-free yogurt and low-fat cottage cheese. Try low-fat cheeses such as mozzarella and other reduced-fat cheeses. · Choose meat and other protein foods that are low in fat. Choose beans or other legumes such as split peas or lentils. Choose fish, skinless poultry (chicken or turkey), or lean cuts of red meat (beef or pork). Before you cook meat or poultry, cut off any visible fat. · Use less fat and oil. Try baking foods instead of frying them. Add less fat, such as margarine, sour cream, regular salad dressing and mayonnaise to foods. Eat fewer high-fat foods. Some examples of high-fat foods include french fries, doughnuts, ice cream, and cakes. · Eat fewer sweets. Limit foods and drinks that are high in sugar. This includes candy, cookies, regular soda, and sweetened drinks. Exercise:  Exercise at least 30 minutes per day on most days of the week. Some examples of exercise include walking, biking, dancing, and swimming. You can also fit in more physical activity by taking the stairs instead of the elevator or parking farther away from stores. Ask your healthcare provider about the best exercise plan for you. Alcohol Use and Your Health    Drinking too much can harm your health. Excessive alcohol use leads to about 88,000 death in [facility] each year, and shortens the life of those who diet by almost 30 years. Further, excessive drinking cost the economy $249 billion in 2010. Most excessive drinkers are not alcohol dependent. Excessive alcohol use has immediate effects that increase the risk of many harmful health conditions. These are most often the result of binge drinking. Over time, excessive alcohol use can lead to the development of chronic diseases and other series health problems. What is considered a "drink"? Excessive alcohol use includes:  · Binge Drinking: For women, 4 or more drinks consumed on one occasion. For men, 5 or more drinks consumed on one occasion. · Heavy Drinking: For women, 8 or more drinks per week. For men, 15 or more drinks per week  · Any alcohol used by pregnant women  · Any alcohol used by those under the age of 21 years    If you choose to drink, do so in moderation:  · Do not drink at all if you are under the age of 24, or if you are or may be pregnant, or have health problems that could be made worse by drinking.   · For women, up to 1 drink per day  · For men, up to 2 drinks a day    No one should begin drinking or drink more frequently based on potential health benefits    Short-Term Health Risks:  · Injuries: motor vehicle crashes, falls, drownings, burns  · Violence: homicide, suicide, sexual assault, intimate partner violence  · Alcohol poisoning  · Reproductive health: risky sexual behaviors, unintended prengnacy, sexually transmitted diseases, miscarriage, stillbirth, fetal alcohol syndrome    Long-Term Health Risks:  · Chronic diseases: high blood pressure, heart disease, stroke, liver disease, digestive problems  · Cancers: breast, mouth and throat, liver, colon  · Learning and memory problems: dementia, poor school performance  · Mental health: depression, anxiety, insomnia  · Social problems: lost productivity, family problems, unemployment  · Alcohol dependence    For support and more information:  · Substance Abuse and [address]  Web Address: https://[employer]/    · Alcoholics Anonymous        Web Address: http://www.basestone.info/    https://www.cdc.gov/alcohol/fact-sheets/alcohol-use.htm     © Copyright [employer] 2018 Information is for End User's use only and may not be sold, redistributed or otherwise used for commercial purposes.  All illustrations and images included in CareNotes® are the copyrighted property of A.D.A.M., Inc. or [address] [place] [address]

## 2023-08-29 NOTE — PROGRESS NOTES
Chief Complaint   Patient presents with   • Medicare Wellness Visit     Welcome to [facility]  Health Maintenance   Topic Date Due   • Osteoporosis Screening  Never done   • Breast Cancer Screening: Mammogram  07/18/2019   • COVID-19 Vaccine (2 - Booster for Mee series) 10/09/2021   • BMI: Followup Plan  02/09/2023   • Annual Physical  02/09/2023   • Pneumococcal Vaccine: 65+ Years (3 - PPSV23 if available, else PCV20) 02/09/2023   • Influenza Vaccine (1) 09/01/2023   • Hepatitis C Screening  09/29/2025 (Originally 1955)   • Depression Remission PHQ  02/29/2024   • Fall Risk  08/29/2024   • Urinary Incontinence Screening  08/29/2024   • BMI: Adult  08/29/2024   • Colorectal Cancer Screening  06/16/2031   • DTaP,Tdap,and Td Vaccines (3 - Td or Tdap) 08/29/2033   • HIB Vaccine  Aged Out   • IPV Vaccine  Aged Out   • Hepatitis A Vaccine  Aged Out   • Meningococcal ACWY Vaccine  Aged Out   • HPV Vaccine  Aged Out        Assessment and Plan:     Problem List Items Addressed This Visit        Endocrine    Hypothyroidism     Most recent TSH in July was 6.09. She remains on levothyroxine 88 mcg daily. The patient did admit to not being compliant with her medication at the time of testing. She will repeat her TSH prior to her next visit. No medication adjustments at this visit. Component      Latest Ref Rng 7/3/2023   TSH W/RFX TO FREE T4      0.40 - 4.50 mIU/L 6.09 (H)       Legend:  (H) High             Relevant Orders    TSH, 3rd generation with Free T4 reflex       Cardiovascular and Mediastinum    HTN (hypertension)     Blood pressure in the office today is 132/84. She continues on her losartan, metoprolol and amlodipine. Lifestyle modifications discussed             Other    Mixed hyperlipidemia     The patient had been on Lipitor, however this was discontinued due to myalgias. Her lipid panel is stable. Low fat diet, exercise and weight loss recommended.      Component      Latest Ref Rng 9/26/2022 7/3/2023   Cholesterol      <200 mg/dL 207 (H)  191    HDL      > OR = 50 mg/dL 51  49 (L)    Triglycerides      <150 mg/dL 298 (H)  247 (H)    LDL Calculated      mg/dL (calc) 116 (H)  105 (H)    Chol HDLC Ratio      <5.0 (calc) 4.1  3.9    Non-HDL Cholesterol      <130 mg/dL (calc) 156 (H)  142 (H)       Legend:  (H) High  (L) Low           Generalized anxiety disorder     Continue citalopram and klonopin. Anxiety symptoms stable. Mild episode of recurrent major depressive disorder ([address])   Other Visit Diagnoses     Welcome to Medicare preventive visit    -  Primary    Relevant Orders    POCT ECG (Completed)    Need for hepatitis C screening test        Encounter for screening mammogram for breast cancer        Relevant Orders    Mammo screening bilateral w 3d & cad    Encounter for immunization        Relevant Orders    TDAP VACCINE GREATER THAN OR EQUAL TO 8YO IM (Completed)    Postmenopausal        Relevant Orders    DXA bone density spine hip and pelvis           Preventive health issues were discussed with patient, and age appropriate screening tests were ordered as noted in patient's After Visit Summary. Personalized health advice and appropriate referrals for health education or preventive services given if needed, as noted in patient's After Visit Summary. History of Present Illness:     Patient presents for a Medicare Wellness Visit. Overall she feels well. Due for mammogram and DEXA scan and these were ordered. Surveillance blood work ordered. HPI   Patient Care Team:  Nancy Weiner MD as PCP - Hannah Benítez MD as PCP - Hannah Benítez MD as PCP - PCP-Vesta Wilcox MD as PCP - [address] [place] [address] (RTE)  Priya Yeh MD as Endoscopist     Review of Systems:     Review of Systems   Constitutional: Negative for activity change, fatigue and fever.    HENT: Negative for congestion, hearing loss, rhinorrhea, trouble swallowing and voice change. Eyes: Negative for photophobia, pain, discharge and visual disturbance. Respiratory: Negative for cough, chest tightness and shortness of breath. Cardiovascular: Negative for chest pain, palpitations and leg swelling. Gastrointestinal: Negative for abdominal pain, blood in stool, constipation, nausea and vomiting. Endocrine: Negative for cold intolerance and heat intolerance. Genitourinary: Negative for difficulty urinating, frequency, hematuria, urgency, vaginal bleeding and vaginal discharge. Musculoskeletal: Negative for arthralgias and myalgias. Skin: Negative. Neurological: Negative for dizziness, weakness, numbness and headaches. Psychiatric/Behavioral: Negative for decreased concentration. The patient is not nervous/anxious. Problem List:     Patient Active Problem List   Diagnosis   • Mixed hyperlipidemia   • Hypothyroidism   • HTN (hypertension)   • Carotid artery stenosis   • Generalized anxiety disorder   • Obesity (BMI 30.0-34. 9)   • Vitamin D deficiency   • Breast asymmetry   • Well adult exam   • Diverticulitis   • Arthralgia of multiple sites   • Memory problem   • Mild episode of recurrent major depressive disorder ([address])   • Post-COVID-19 syndrome   • Hair loss      Past Medical and Surgical History:     Past Medical History:   Diagnosis Date   • Anxiety    • Depression    • Disease of thyroid gland     hypo   • Dissection of carotid artery (HCC)     Left   • Diverticulitis    • Herniated lumbar intervertebral disc    • Hypertension    • Stroke [facility])    • Transient cerebral ischemia     Pt with hx TIA/CVA in 1/2009. Off coumadin per vascular. 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 Last assessed: 06/01/12     Past Surgical History:   Procedure Laterality Date   • BACK SURGERY  1992    Lower, for herniated disc.    • CHOLECYSTECTOMY     • COLONOSCOPY     • FRACTURE SURGERY      Open treatment of fracture of one metacarpal bone, right 3rd metacarpal pin. • SC COLONOSCOPY FLX DX W/COLLJ SPEC WHEN PFRMD N/A 3/30/2016    Procedure: COLONOSCOPY;  Surgeon: Kerry Chua MD;  Location: [facility]; Service: Colorectal      Family History:     Family History   Problem Relation Age of Onset   • Diabetes Mother    • Depression Father    • Coronary artery disease Father    • Diabetes Daughter    • Cancer Sister       Social History:     Social History     Socioeconomic History   • Marital status:      Spouse name: None   • Number of children: None   • Years of education: None   • Highest education level: None   Occupational History   • None   Tobacco Use   • Smoking status: Never   • Smokeless tobacco: Never   Vaping Use   • Vaping Use: Never used   Substance and Sexual Activity   • Alcohol use: Yes     Comment: Social   • Drug use: Never   • Sexual activity: None   Other Topics Concern   • None   Social History Narrative   • None     Social Determinants of Health     Financial Resource Strain: High Risk (8/29/2023)    Overall Financial Resource Strain (CARDIA)    • Difficulty of Paying Living Expenses: Very hard   Food Insecurity: Not on file   Transportation Needs: No Transportation Needs (8/29/2023)    PRAPARE - Transportation    • Lack of Transportation (Medical): No    • Lack of Transportation (Non-Medical):  No   Physical Activity: Not on file   Stress: Not on file   Social Connections: Not on file   Intimate Partner Violence: Not on file   Housing Stability: Not on file      Medications and Allergies:     Current Outpatient Medications   Medication Sig Dispense Refill   • amLODIPine (NORVASC) 5 mg tablet take 1 tablet by mouth once daily 30 tablet 6   • atorvastatin (LIPITOR) 40 mg tablet Take 1 tablet (40 mg total) by mouth daily 30 tablet 5   • citalopram (CeleXA) 40 mg tablet Take 1 tablet (40 mg total) by mouth daily 30 tablet 5   • clonazePAM (KlonoPIN) 2 mg tablet take 1 tablet by mouth once daily at bedtime 30 tablet 5   • ergocalciferol (VITAMIN D2) 50,000 units TAKE 1 CAPSULE BY MOUTH ONCE A WEEK 12 capsule 1   • lamoTRIgine (LaMICtal) 200 MG tablet take 1 tablet by mouth once daily 30 tablet 6   • levothyroxine 88 mcg tablet take 1 tablet by mouth once daily 90 tablet 5   • losartan (COZAAR) 100 MG tablet take 1 tablet by mouth once daily 30 tablet 3   • Magnesium 400 MG CAPS Take 1 caps. daily 30 capsule 5   • methocarbamol (ROBAXIN) 500 mg tablet Take 1 tab. every 8 hr. PRN for back pain/ muscle spasms 30 tablet 0   • metoprolol succinate (TOPROL-XL) 25 mg 24 hr tablet take 1 tablet by mouth twice a day 180 tablet 6     No current facility-administered medications for this visit.      Allergies   Allergen Reactions   • Bactrim [Sulfamethoxazole-Trimethoprim] Hives     Other reaction(s): "red spots"      Immunizations:     Immunization History   Administered Date(s) Administered   • COVID-19 J&J (Mee) vaccine 0.5 mL 08/14/2021   • INFLUENZA 10/29/2017, 10/10/2018   • Influenza Quadrivalent 3 years and older 10/20/2017   • Influenza, high dose seasonal 0.7 mL 09/27/2022   • Influenza, recombinant, quadrivalent,injectable, preservative free 10/27/2019   • Influenza, seasonal, injectable 09/10/2015   • Pneumococcal Conjugate 13-Valent 02/09/2022   • Pneumococcal Polysaccharide PPV23 03/08/2016   • Td (adult), adsorbed 01/01/1996, 06/02/2013   • Tdap 06/02/2013, 08/29/2023   • Tuberculin Skin Test-PPD Intradermal 03/13/2018, 02/11/2020, 02/09/2022   • influenza, trivalent, adjuvanted 09/21/2020      Health Maintenance:         Topic Date Due   • Breast Cancer Screening: Mammogram  07/18/2019   • Hepatitis C Screening  09/29/2025 (Originally 1955)   • Colorectal Cancer Screening  06/16/2031         Topic Date Due   • COVID-19 Vaccine (2 - Booster for Mee series) 10/09/2021   • Pneumococcal Vaccine: 65+ Years (3 - PPSV23 if available, else PCV20) 02/09/2023   • Influenza Vaccine (1) 09/01/2023      Medicare Screening Tests and Risk Assessments:     Rene River is here for her Welcome to Medicare visit. Health Risk Assessment:   Patient rates overall health as good. Patient feels that their physical health rating is slightly worse. Patient is dissatisfied with their life. Eyesight was rated as slightly worse. Hearing was rated as same. Patient feels that their emotional and mental health rating is same. Patients states they are sometimes angry. Patient states they are often unusually tired/fatigued. Pain experienced in the last 7 days has been none. Patient states that she has experienced weight loss or gain in last 6 months. Gained Weight over the last 2 years    Depression Screening:   PHQ-9 Score: 6      Fall Risk Screening: In the past year, patient has experienced: history of falling in past year    Number of falls: 1    Urinary Incontinence Screening:   Patient has not leaked urine accidently in the last six months. Trip and fall without injury    Home Safety:  Patient does not have trouble with stairs inside or outside of their home. Patient has working smoke alarms and has working carbon monoxide detector. Home safety hazards include: none. Some clutter. Have no storage or closet space    Nutrition:   Current diet is Regular and Frequent junk food. Have both. Can't afford fresh fruits & vegetables    Medications:   Patient is not currently taking any over-the-counter supplements. Patient is able to manage medications. Activities of Daily Living (ADLs)/Instrumental Activities of Daily Living (IADLs):   Walk and transfer into and out of bed and chair?: Yes  Dress and groom yourself?: Yes    Bathe or shower yourself?: Yes    Feed yourself? Yes  Do your laundry/housekeeping?: Yes  Manage your money, pay your bills and track your expenses?: Yes  Make your own meals?: Yes    Do your own shopping?: Yes    ADL comments: Im retired.  Tight money situation    Previous Hospitalizations:   Any hospitalizations or ED visits within the last 12 months?: No      Advance Care Planning:   Living will: No    Durable POA for healthcare: No    Advanced directive: No    Five wishes given: Yes      Comments:   I reviewed the 5 wishes pamphlet with the patient. I did encourage them to fill this out and include a medication list and list of allergies in the folder. Cognitive Screening:   Provider or family/friend/caregiver concerned regarding cognition?: No    PREVENTIVE SCREENINGS      Cardiovascular Screening:    General: Screening Not Indicated and History Lipid Disorder      Diabetes Screening:     General: Screening Current      Colorectal Cancer Screening:     General: Screening Current      Breast Cancer Screening:       Due for: Mammogram        Cervical Cancer Screening:    General: Screening Not Indicated      Osteoporosis Screening:      Due for: DXA Axial      Abdominal Aortic Aneurysm (AAA) Screening:        General: Screening Not Indicated      Lung Cancer Screening:     General: Screening Not Indicated      Hepatitis C Screening:    General: Screening Current    Screening, Brief Intervention, and Referral to Treatment (SBIRT)    Screening  Typical number of drinks in a day: 1  Typical number of drinks in a week: 2  Interpretation: Low risk drinking behavior. AUDIT-C Screenin) How often did you have a drink containing alcohol in the past year? 2 to 4 times a month  2) How many drinks did you have on a typical day when you were drinking in the past year? 3 to 4  3) How often did you have 6 or more drinks on one occasion in the past year? monthly    AUDIT-C Score: 4  Interpretation: Score 3-12 (female): POSITIVE screen for alcohol misuse    AUDIT Screenin) How often during the last year have you found that you were not able to stop drinking once you had started?  0 - never  5) How often during the last year have you failed to do what was normally expected from you because of drinking? 0 - never  6) How often during the last year have you needed a first drink in the morning to get yourself going after a heavy drinking session? 0 - never  7) How often during the last year have you had a feeling of guilt or remorse after drinking? 0 - never  8) How often during the last year have you been unable to remember what happened the night before because you had been drinking? 0 - never  9) Have you or someone else been injured as a result of your drinking? 0 - no  10) Has a relative or friend or a doctor or another health worker been concerned about your drinking or suggested you cut down? 0 - no    AUDIT Score: 4  Interpretation: Low risk alcohol consumption    Single Item Drug Screening:  How often have you used an illegal drug (including marijuana) or a prescription medication for non-medical reasons in the past year? never    Single Item Drug Screen Score: 0  Interpretation: Negative screen for possible drug use disorder    Other Counseling Topics:   Car/seat belt/driving safety, skin self-exam and regular weightbearing exercise. No results found. Physical Exam:     /84   Pulse 66   Temp 97.5 °F (36.4 °C) (Temporal)   Resp 14   Ht 5' 6" (1.676 m)   Wt 85.3 kg (188 lb)   SpO2 95%   BMI 30.34 kg/m²     Physical Exam  Vitals and nursing note reviewed. Constitutional:       General: She is not in acute distress. Appearance: Normal appearance. She is obese. HENT:      Head: Normocephalic and atraumatic. Right Ear: Tympanic membrane, ear canal and external ear normal. There is no impacted cerumen. Left Ear: Tympanic membrane, ear canal and external ear normal. There is no impacted cerumen. Nose: Nose normal.      Mouth/Throat:      Mouth: Mucous membranes are moist.      Pharynx: Oropharynx is clear. No posterior oropharyngeal erythema. Eyes:      General:         Right eye: No discharge. Left eye: No discharge. Extraocular Movements: Extraocular movements intact. Pupils: Pupils are equal, round, and reactive to light. Cardiovascular:      Rate and Rhythm: Normal rate and regular rhythm. Heart sounds: Murmur heard. Pulmonary:      Effort: Pulmonary effort is normal.      Breath sounds: Normal breath sounds. Abdominal:      General: Abdomen is flat. Musculoskeletal:         General: Normal range of motion. Cervical back: Normal range of motion. Skin:     General: Skin is warm and dry. Capillary Refill: Capillary refill takes less than 2 seconds. Neurological:      General: No focal deficit present. Mental Status: She is alert and oriented to person, place, and time. Mental status is at baseline. Psychiatric:         Mood and Affect: Mood normal.         Behavior: Behavior normal.         Thought Content:  Thought content normal.         Judgment: Judgment normal.          PRISCILLA Uriostegui

## 2023-08-30 NOTE — ASSESSMENT & PLAN NOTE
Blood pressure in the office today is 132/84. She continues on her losartan, metoprolol and amlodipine.  Lifestyle modifications discussed

## 2023-08-30 NOTE — ASSESSMENT & PLAN NOTE
Most recent TSH in July was 6.09. She remains on levothyroxine 88 mcg daily. The patient did admit to not being compliant with her medication at the time of testing. She will repeat her TSH prior to her next visit. No medication adjustments at this visit.        Component      Latest Ref Rng 7/3/2023   TSH W/RFX TO FREE T4      0.40 - 4.50 mIU/L 6.09 (H)       Legend:  (H) High

## 2023-10-23 ENCOUNTER — TELEPHONE (OUTPATIENT)
Dept: OTHER | Facility: OTHER | Age: 68
End: 2023-10-23

## 2023-10-23 DIAGNOSIS — I10 ESSENTIAL HYPERTENSION: ICD-10-CM

## 2023-10-23 DIAGNOSIS — E55.9 VITAMIN D DEFICIENCY: ICD-10-CM

## 2023-10-23 DIAGNOSIS — E03.9 ACQUIRED HYPOTHYROIDISM: ICD-10-CM

## 2023-10-24 RX ORDER — AMLODIPINE BESYLATE 5 MG/1
5 TABLET ORAL DAILY
Qty: 30 TABLET | Refills: 6 | Status: CANCELLED | OUTPATIENT
Start: 2023-10-24

## 2023-10-24 RX ORDER — LEVOTHYROXINE SODIUM 88 UG/1
88 TABLET ORAL DAILY
Qty: 90 TABLET | Refills: 0 | Status: CANCELLED | OUTPATIENT
Start: 2023-10-24

## 2023-10-24 RX ORDER — ERGOCALCIFEROL 1.25 MG/1
50000 CAPSULE ORAL WEEKLY
Qty: 12 CAPSULE | Refills: 0 | OUTPATIENT
Start: 2023-10-24

## 2023-10-24 NOTE — TELEPHONE ENCOUNTER
Called Patient to see what pharmacy she wants to use.  Doesn't know will call back when she figures out which pharmacy she wants to use

## 2023-10-24 NOTE — TELEPHONE ENCOUNTER
Medication Refill Request     Name levothyroxine   Dose/Frequency 88 mcg tablet/take 1 tablet by mouth once daily  Quantity 90 tablet  Verified pharmacy   [x]  Verified ordering Provider   [x]  Does patient have enough for the next 3 days? Yes [] No [x]Medication Refill Request     Name ergocalciferol (VITAMIN D2)   Dose/Frequency 50,000 units/TAKE 1 CAPSULE BY MOUTH ONCE A WEEK  Quantity 12 capsule  Verified pharmacy   [x]  Verified ordering Provider   [x]  Does patient have enough for the next 3 days?  Yes [] No [x]

## 2023-10-24 NOTE — TELEPHONE ENCOUNTER
Patient left  after hours, states she will be using [facility] [address] in [facility], Also requesting amlodipine

## 2023-10-25 DIAGNOSIS — F41.9 ANXIETY DISORDER, UNSPECIFIED TYPE: ICD-10-CM

## 2023-10-25 DIAGNOSIS — E78.2 MIXED HYPERLIPIDEMIA: ICD-10-CM

## 2023-10-25 DIAGNOSIS — E55.9 VITAMIN D DEFICIENCY: ICD-10-CM

## 2023-10-25 DIAGNOSIS — M79.10 MYALGIA: ICD-10-CM

## 2023-10-25 DIAGNOSIS — E03.9 ACQUIRED HYPOTHYROIDISM: ICD-10-CM

## 2023-10-25 RX ORDER — ERGOCALCIFEROL 1.25 MG/1
50000 CAPSULE ORAL WEEKLY
Qty: 12 CAPSULE | Refills: 0 | Status: CANCELLED | OUTPATIENT
Start: 2023-10-25

## 2023-10-25 RX ORDER — LEVOTHYROXINE SODIUM 88 UG/1
88 TABLET ORAL DAILY
Qty: 90 TABLET | Refills: 0 | Status: CANCELLED | OUTPATIENT
Start: 2023-10-25

## 2023-10-25 RX ORDER — CLONAZEPAM 2 MG/1
2 TABLET ORAL
Qty: 30 TABLET | Refills: 0 | Status: CANCELLED | OUTPATIENT
Start: 2023-10-25

## 2023-10-26 DIAGNOSIS — F41.8 DEPRESSION WITH ANXIETY: ICD-10-CM

## 2023-10-28 RX ORDER — CITALOPRAM 40 MG/1
40 TABLET ORAL DAILY
Qty: 30 TABLET | Refills: 5 | Status: SHIPPED | OUTPATIENT
Start: 2023-10-28

## 2023-10-30 DIAGNOSIS — E03.9 ACQUIRED HYPOTHYROIDISM: ICD-10-CM

## 2023-10-30 DIAGNOSIS — M79.10 MYALGIA: ICD-10-CM

## 2023-10-30 DIAGNOSIS — E55.9 VITAMIN D DEFICIENCY: ICD-10-CM

## 2023-10-30 DIAGNOSIS — F41.9 ANXIETY DISORDER, UNSPECIFIED TYPE: ICD-10-CM

## 2023-10-30 RX ORDER — LEVOTHYROXINE SODIUM 88 UG/1
88 TABLET ORAL DAILY
Qty: 90 TABLET | Refills: 5 | Status: SHIPPED | OUTPATIENT
Start: 2023-10-30

## 2023-10-30 RX ORDER — CLONAZEPAM 2 MG/1
2 TABLET ORAL
Qty: 30 TABLET | Refills: 5 | Status: SHIPPED | OUTPATIENT
Start: 2023-10-30

## 2023-10-30 RX ORDER — ERGOCALCIFEROL 1.25 MG/1
50000 CAPSULE ORAL WEEKLY
Qty: 12 CAPSULE | Refills: 1 | Status: SHIPPED | OUTPATIENT
Start: 2023-10-30

## 2023-10-30 RX ORDER — LEVOTHYROXINE SODIUM 88 UG/1
88 TABLET ORAL DAILY
Qty: 90 TABLET | Refills: 5 | Status: CANCELLED | OUTPATIENT
Start: 2023-10-30

## 2023-10-30 RX ORDER — ATORVASTATIN CALCIUM 40 MG/1
40 TABLET, FILM COATED ORAL DAILY
Qty: 30 TABLET | Refills: 0 | Status: SHIPPED | OUTPATIENT
Start: 2023-10-30

## 2023-10-30 RX ORDER — ERGOCALCIFEROL 1.25 MG/1
50000 CAPSULE ORAL WEEKLY
Qty: 12 CAPSULE | Refills: 1 | Status: CANCELLED | OUTPATIENT
Start: 2023-10-30

## 2023-10-30 NOTE — TELEPHONE ENCOUNTER
LMOM: - I need prescriptions really bad. I tried it on mychart and I called twice here and I got this and I called late and I got the answering service and when I tried it on my chart the IT said they were the ones that I called in were not available at this time on my chart. Now I've been without Klonopin the whole weekend and I haven't slept so -I really need that. And I need the the generic form of Synthroid. And then if you could do my vitamin D and I forget what the other one was, Magnesium or something. A few weeks ago I was at [employer]. Now I'm at [facility] on [address]. I don't have the number available, but I really really really need these prescriptions, so I don't know what else to do. I need the Klonopin ASAP because I didn't sleep the whole weekend. OK. Thanks.

## 2023-11-02 DIAGNOSIS — E03.9 ACQUIRED HYPOTHYROIDISM: ICD-10-CM

## 2023-11-02 DIAGNOSIS — F41.9 ANXIETY DISORDER, UNSPECIFIED TYPE: ICD-10-CM

## 2023-11-02 DIAGNOSIS — E55.9 VITAMIN D DEFICIENCY: ICD-10-CM

## 2023-11-02 DIAGNOSIS — M79.10 MYALGIA: ICD-10-CM

## 2023-11-02 DIAGNOSIS — E78.2 MIXED HYPERLIPIDEMIA: ICD-10-CM

## 2023-11-02 RX ORDER — ATORVASTATIN CALCIUM 40 MG/1
40 TABLET, FILM COATED ORAL DAILY
Qty: 30 TABLET | Refills: 0 | OUTPATIENT
Start: 2023-11-02

## 2023-11-02 RX ORDER — ERGOCALCIFEROL 1.25 MG/1
50000 CAPSULE ORAL WEEKLY
Qty: 12 CAPSULE | Refills: 1 | OUTPATIENT
Start: 2023-11-02

## 2023-11-02 RX ORDER — CLONAZEPAM 2 MG/1
2 TABLET ORAL
Qty: 30 TABLET | Refills: 5 | OUTPATIENT
Start: 2023-11-02

## 2023-11-02 RX ORDER — LEVOTHYROXINE SODIUM 88 UG/1
88 TABLET ORAL DAILY
Qty: 90 TABLET | Refills: 5 | OUTPATIENT
Start: 2023-11-02

## 2023-11-02 NOTE — TELEPHONE ENCOUNTER
LMOM: Conchetta Fruit -I was having problems with my getting my prescriptions, but now [facility] just texting me 5 minutes ago and said they're ready. So I guess the doctor was going to call them in for me at a different firm, the [facility]. But there's no need to do that anymore because it was first. - Yeah, so there's like I said, there's no, I'm good now. It's just took them so long I never had to wait before. So alright, but like I said they could. You can cancel the one out for the [employer]. OK. If you have any questions, [phone]. Thank you so much.

## 2023-11-05 DIAGNOSIS — I10 ESSENTIAL HYPERTENSION: ICD-10-CM

## 2023-11-06 RX ORDER — METOPROLOL SUCCINATE 25 MG/1
25 TABLET, EXTENDED RELEASE ORAL 2 TIMES DAILY
Qty: 180 TABLET | Refills: 0 | Status: SHIPPED | OUTPATIENT
Start: 2023-11-06 | End: 2023-11-12 | Stop reason: SDUPTHER

## 2023-11-21 DIAGNOSIS — I10 ESSENTIAL HYPERTENSION: ICD-10-CM

## 2023-11-21 RX ORDER — METOPROLOL SUCCINATE 25 MG/1
25 TABLET, EXTENDED RELEASE ORAL 2 TIMES DAILY
Qty: 180 TABLET | Refills: 0 | Status: SHIPPED | OUTPATIENT
Start: 2023-11-21

## 2023-12-04 DIAGNOSIS — I10 ESSENTIAL HYPERTENSION: ICD-10-CM

## 2023-12-04 DIAGNOSIS — F41.9 ANXIETY DISORDER, UNSPECIFIED TYPE: ICD-10-CM

## 2023-12-04 RX ORDER — LOSARTAN POTASSIUM 100 MG/1
100 TABLET ORAL DAILY
Qty: 30 TABLET | Refills: 0 | Status: SHIPPED | OUTPATIENT
Start: 2023-12-04

## 2023-12-04 RX ORDER — CLONAZEPAM 2 MG/1
2 TABLET ORAL
Qty: 30 TABLET | Refills: 5 | Status: SHIPPED | OUTPATIENT
Start: 2023-12-04

## 2023-12-17 DIAGNOSIS — I10 ESSENTIAL HYPERTENSION: ICD-10-CM

## 2023-12-17 DIAGNOSIS — F41.8 DEPRESSION WITH ANXIETY: ICD-10-CM

## 2023-12-18 RX ORDER — AMLODIPINE BESYLATE 5 MG/1
5 TABLET ORAL DAILY
Qty: 30 TABLET | Refills: 0 | Status: SHIPPED | OUTPATIENT
Start: 2023-12-18

## 2023-12-18 RX ORDER — LAMOTRIGINE 200 MG/1
200 TABLET ORAL DAILY
Qty: 30 TABLET | Refills: 5 | Status: SHIPPED | OUTPATIENT
Start: 2023-12-18

## 2023-12-18 RX ORDER — CITALOPRAM 40 MG/1
40 TABLET ORAL DAILY
Qty: 30 TABLET | Refills: 0 | Status: SHIPPED | OUTPATIENT
Start: 2023-12-18

## 2024-01-03 DIAGNOSIS — F41.9 ANXIETY DISORDER, UNSPECIFIED TYPE: ICD-10-CM

## 2024-01-03 RX ORDER — CLONAZEPAM 2 MG/1
2 TABLET ORAL
Qty: 30 TABLET | Refills: 5 | Status: SHIPPED | OUTPATIENT
Start: 2024-01-03

## 2024-01-16 DIAGNOSIS — I10 ESSENTIAL HYPERTENSION: ICD-10-CM

## 2024-01-16 DIAGNOSIS — F41.8 DEPRESSION WITH ANXIETY: ICD-10-CM

## 2024-01-16 RX ORDER — LOSARTAN POTASSIUM 100 MG/1
100 TABLET ORAL DAILY
Qty: 30 TABLET | Refills: 5 | Status: SHIPPED | OUTPATIENT
Start: 2024-01-16

## 2024-01-16 RX ORDER — CITALOPRAM 40 MG/1
40 TABLET ORAL DAILY
Qty: 30 TABLET | Refills: 5 | Status: SHIPPED | OUTPATIENT
Start: 2024-01-16

## 2024-01-19 DIAGNOSIS — E55.9 VITAMIN D DEFICIENCY: ICD-10-CM

## 2024-01-19 DIAGNOSIS — E03.9 ACQUIRED HYPOTHYROIDISM: ICD-10-CM

## 2024-01-19 DIAGNOSIS — M79.10 MYALGIA: ICD-10-CM

## 2024-01-19 RX ORDER — LEVOTHYROXINE SODIUM 88 UG/1
88 TABLET ORAL DAILY
Qty: 90 TABLET | Refills: 5 | Status: SHIPPED | OUTPATIENT
Start: 2024-01-19

## 2024-01-19 RX ORDER — ERGOCALCIFEROL 1.25 MG/1
CAPSULE ORAL
Qty: 12 CAPSULE | Refills: 1 | Status: SHIPPED | OUTPATIENT
Start: 2024-01-19

## 2024-02-01 DIAGNOSIS — E78.2 MIXED HYPERLIPIDEMIA: ICD-10-CM

## 2024-02-01 DIAGNOSIS — F41.9 ANXIETY DISORDER, UNSPECIFIED TYPE: ICD-10-CM

## 2024-02-02 RX ORDER — CLONAZEPAM 2 MG/1
2 TABLET ORAL
Qty: 30 TABLET | Refills: 5 | Status: SHIPPED | OUTPATIENT
Start: 2024-02-02

## 2024-02-02 RX ORDER — ATORVASTATIN CALCIUM 40 MG/1
40 TABLET, FILM COATED ORAL DAILY
Qty: 30 TABLET | Refills: 5 | Status: SHIPPED | OUTPATIENT
Start: 2024-02-02

## 2024-02-21 PROBLEM — Z00.00 WELL ADULT EXAM: Status: RESOLVED | Noted: 2020-02-11 | Resolved: 2024-02-21

## 2024-02-27 DIAGNOSIS — I10 ESSENTIAL HYPERTENSION: ICD-10-CM

## 2024-02-27 RX ORDER — AMLODIPINE BESYLATE 5 MG/1
5 TABLET ORAL DAILY
Qty: 30 TABLET | Refills: 3 | Status: SHIPPED | OUTPATIENT
Start: 2024-02-27

## 2024-02-27 RX ORDER — METOPROLOL SUCCINATE 25 MG/1
25 TABLET, EXTENDED RELEASE ORAL 2 TIMES DAILY
Qty: 180 TABLET | Refills: 0 | Status: SHIPPED | OUTPATIENT
Start: 2024-02-27

## 2024-02-28 ENCOUNTER — TELEPHONE (OUTPATIENT)
Dept: FAMILY MEDICINE CLINIC | Facility: CLINIC | Age: 69
End: 2024-02-28

## 2024-02-28 NOTE — TELEPHONE ENCOUNTER
Voicemaill: Yes, Phone number [phone]. I believe I had called Thursday to have paperwork sent to my house to have blood work done at [facility]. I haven't received it yet and my appointment is for tomorrow, so I don't know. I mean if it comes today, I could, I could go there tomorrow morning, but I don't know how fast the results come back. So I don't know if I should cancel or I mean, I can have the appointment for physical because I I'm pretty sure I'm overdue for a physical. Yeah, I'm just not sure what to do. If you can call me back that would be great because I hate to come up there and not have any results of my blood work with me. I am babysitting, but I will try and grab the phone. OK. Thank you. Uzair.      - Lab orders were sent to patient by mail but not received. Patient has not completed lab work for visit. Please confirm if appointment should be kept or rescheduled.

## 2024-02-29 ENCOUNTER — OFFICE VISIT (OUTPATIENT)
Dept: FAMILY MEDICINE CLINIC | Facility: CLINIC | Age: 69
End: 2024-02-29
Payer: COMMERCIAL

## 2024-02-29 VITALS
DIASTOLIC BLOOD PRESSURE: 70 MMHG | BODY MASS INDEX: 30.41 KG/M2 | WEIGHT: 189.2 LBS | HEART RATE: 65 BPM | OXYGEN SATURATION: 97 % | SYSTOLIC BLOOD PRESSURE: 134 MMHG | RESPIRATION RATE: 18 BRPM | HEIGHT: 66 IN | TEMPERATURE: 98 F

## 2024-02-29 DIAGNOSIS — E03.9 ACQUIRED HYPOTHYROIDISM: ICD-10-CM

## 2024-02-29 DIAGNOSIS — F33.0 MILD EPISODE OF RECURRENT MAJOR DEPRESSIVE DISORDER (HCC): ICD-10-CM

## 2024-02-29 DIAGNOSIS — R10.13 DYSPEPSIA: ICD-10-CM

## 2024-02-29 DIAGNOSIS — E66.9 OBESITY (BMI 30.0-34.9): ICD-10-CM

## 2024-02-29 DIAGNOSIS — K21.9 GASTROESOPHAGEAL REFLUX DISEASE, UNSPECIFIED WHETHER ESOPHAGITIS PRESENT: ICD-10-CM

## 2024-02-29 DIAGNOSIS — E55.9 VITAMIN D DEFICIENCY: ICD-10-CM

## 2024-02-29 DIAGNOSIS — E78.2 MIXED HYPERLIPIDEMIA: ICD-10-CM

## 2024-02-29 DIAGNOSIS — I10 PRIMARY HYPERTENSION: Primary | ICD-10-CM

## 2024-02-29 DIAGNOSIS — F41.1 GENERALIZED ANXIETY DISORDER: ICD-10-CM

## 2024-02-29 PROCEDURE — 99214 OFFICE O/P EST MOD 30 MIN: CPT | Performed by: FAMILY MEDICINE

## 2024-02-29 RX ORDER — FAMOTIDINE 20 MG/1
TABLET, FILM COATED ORAL
Qty: 60 TABLET | Refills: 3 | Status: SHIPPED | OUTPATIENT
Start: 2024-02-29

## 2024-02-29 NOTE — PROGRESS NOTES
Name: Gayle French      : 1955      MRN: [id number]  Encounter Provider: Kala Torrez MD  Encounter Date: 2024   Encounter department: [facility]    Chief Complaint   Patient presents with    Follow-up     6 month      Health Maintenance   Topic Date Due    Depression Follow-up Plan  Never done    Osteoporosis Screening  Never done    Zoster Vaccine (1 of 2) Never done    Breast Cancer Screening: Mammogram  2019    Annual Physical  2023    Influenza Vaccine (1) 2023    COVID-19 Vaccine (2 - - season) 2023    Depression Screening  2024    Hepatitis C Screening  2025 (Originally 1955)    Fall Risk  2024    Urinary Incontinence Screening  2024    Pneumococcal Vaccine: 65+ Years (3 of 3 - PPSV23 or PCV20) 2027    Colorectal Cancer Screening  2031    DTaP,Tdap,and Td Vaccines (3 - Td or Tdap) 2033    HIB Vaccine  Aged Out    IPV Vaccine  Aged Out    Hepatitis A Vaccine  Aged Out    Meningococcal ACWY Vaccine  Aged Out    HPV Vaccine  Aged Out       Assessment & Plan     1. Primary hypertension  Assessment & Plan:  BP remains stable.  Continue Losartan, Amlodipine, Metoprolol ER.  Follow a low sodium diet.      Orders:  -     Comprehensive metabolic panel; Future; Expected date: 2024  -     Comprehensive metabolic panel    2. Acquired hypothyroidism  Assessment & Plan:  Continue Levothyroxine 88 mcg daily.  Check TSH.      Orders:  -     TSH, 3rd generation with Free T4 reflex; Future; Expected date: 2024  -     TSH, 3rd generation with Free T4 reflex    3. Mixed hyperlipidemia  Assessment & Plan:  Continue Atorvastatin 40 mg daily.  Check CMP, lipid panel.        Orders:  -     Lipid panel; Future  -     Lipid panel    4. Obesity (BMI 30.0-34.9)  Assessment & Plan:  Weight has been stable.    Recommended to work on dietary and lifestyle modifications, losing weight.      5.  Generalized anxiety disorder  Assessment & Plan:  Mood has been stable.  Continue Citalopram 40 mg daily, Klonopin 2 mg at bedtime.        6. Mild episode of recurrent major depressive disorder (HCC)  Assessment & Plan:  PHQ 9 score 4.  Continue Citalopram 40 mg daily, Lamictal 200 mg daily.      7. Dyspepsia  -     famotidine (PEPCID) 20 mg tablet; Take 1 tab. twice daily before meals  -     US abdomen complete; Future; Expected date: 02/29/2024    8. Gastroesophageal reflux disease, unspecified whether esophagitis present  Assessment & Plan:  Patient c/o epigastric pain, occasional nausea, abdominal bloating, heartburn for the past 2 months.  Discussed antireflux measures with patient.    Recommended to avoid fried, fatty, acidic foods.  Decrease caffeine consumption.  Avoid late-night meals.    Start Pepcid 20 mg 1 tablet twice daily before meals.    Will order abdominal U/S.    Consider referral to gastroenterology if symptoms persist or worsen.      9. Vitamin D deficiency  Assessment & Plan:  Patient takes Vit D 50,000 IU 1 capsule once a week.  \  Check Vit D 25-hydroxy.    Orders:  -     Vitamin D 25 hydroxy; Future  -     Vitamin D 25 hydroxy        Depression Screening and Follow-up Plan: Patient was screened for depression during today's encounter. They screened negative with a PHQ-9 score of 4.      HM: schedule screening mammogram, DEXA scan.    Schedule follow-up visit in 4 weeks, review test results.      Subjective      HPI    Patient presents for 6 month follow-up visit.    PMHx: HTN, Hyperlipidemia, Obesity, Hypothyroidism, Depression, Anxiety, Vit D deficiency.      Reviewed current medications, last blood test results from July 2023.    HTN - BP remains stable.    Patient takes Losartan 100 mg daily, Metoprolol ER 25 mg twice daily, Amlodipine 5 mg daily.  Denies chest pain, shortness of breath, dizziness.    Hypothyroidism - on Levothyroxine 88 mcg daily.    Patient c/o epigastric pain,  occasional nausea, abdominal bloating, heartburn for the past 2 months.    Admits to eating acidic foods, drinking coffee 2 cups per day.  Denies tobacco use.  Drinks alcohol on weekends.      She has cholecystectomy 20 years ago.    GERARDO/ Depression - mood has been stable.  Currently taking Citalopram 40 mg daily, Lamictal 200 mg daily, Klonopin 2 mg at bedtime.  Reports no side effects.    Patient did not schedule screening mammogram, DEXA scan as ordered by PRISCILLA Chung in August 2023.    Colonoscopy performed in June 2021 by colorectal surgeon Dr. Levine who recommended to repeat colonoscopy in 10 years.    Review of Systems   Constitutional:  Positive for fatigue. Negative for activity change, appetite change, chills and fever.   HENT:  Negative for congestion, hearing loss, sore throat and trouble swallowing.    Eyes:  Negative for pain, discharge, redness, itching and visual disturbance.   Respiratory:  Negative for cough, chest tightness, shortness of breath and wheezing.    Cardiovascular:  Negative for chest pain, palpitations and leg swelling.   Gastrointestinal:  Positive for abdominal pain (epigastric pain) and nausea (mild). Negative for constipation, diarrhea and vomiting.        Heartburn   Genitourinary:  Negative for difficulty urinating, dysuria and hematuria.   Musculoskeletal:  Positive for back pain (improved). Negative for arthralgias, gait problem and joint swelling.   Skin:  Negative for rash.   Neurological:  Negative for dizziness, syncope and headaches.   Hematological: Negative.    Psychiatric/Behavioral:  Negative for sleep disturbance and suicidal ideas.         Anxiety / Depression - mood has been stable       Current Outpatient Medications on File Prior to Visit   Medication Sig    amLODIPine (NORVASC) 5 mg tablet Take 1 tablet (5 mg total) by mouth daily    atorvastatin (LIPITOR) 40 mg tablet Take 1 tablet (40 mg total) by mouth daily    citalopram (CeleXA) 40 mg tablet TAKE 1  "TABLET BY MOUTH EVERY DAY    clonazePAM (KlonoPIN) 2 mg tablet Take 1 tablet (2 mg total) by mouth daily at bedtime    ergocalciferol (VITAMIN D2) 50,000 units TAKE 1 CAPSULE BY MOUTH ONE TIME PER WEEK    lamoTRIgine (LaMICtal) 200 MG tablet Take 1 tablet (200 mg total) by mouth daily    levothyroxine 88 mcg tablet TAKE 1 TABLET BY MOUTH EVERY DAY    losartan (COZAAR) 100 MG tablet TAKE 1 TABLET BY MOUTH EVERY DAY    Magnesium Oxide -Mg Supplement 400 MG CAPS TAKE 1 CAPSULE BY MOUTH EVERY DAY    metoprolol succinate (TOPROL-XL) 25 mg 24 hr tablet Take 1 tablet (25 mg total) by mouth 2 (two) times a day    [DISCONTINUED] methocarbamol (ROBAXIN) 500 mg tablet Take 1 tab. every 8 hr. PRN for back pain/ muscle spasms (Patient not taking: Reported on 2/29/2024)       Objective     /70   Pulse 65   Temp 98 °F (36.7 °C) (Tympanic)   Resp 18   Ht 5' 6\" (1.676 m)   Wt 85.8 kg (189 lb 3.2 oz)   SpO2 97%   BMI 30.54 kg/m²     Physical Exam  Vitals and nursing note reviewed.   Constitutional:       Appearance: Normal appearance. She is obese.   HENT:      Head: Normocephalic and atraumatic.   Eyes:      Conjunctiva/sclera: Conjunctivae normal.      Pupils: Pupils are equal, round, and reactive to light.   Neck:      Vascular: No carotid bruit.   Cardiovascular:      Rate and Rhythm: Normal rate and regular rhythm.      Heart sounds: No murmur heard.  Pulmonary:      Effort: Pulmonary effort is normal.      Breath sounds: Normal breath sounds.   Abdominal:      General: Bowel sounds are normal. There is no distension.      Palpations: Abdomen is soft.      Tenderness: There is abdominal tenderness (in epigastric area). There is no guarding or rebound.   Musculoskeletal:         General: No swelling. Normal range of motion.      Cervical back: Normal range of motion and neck supple.      Right lower leg: No edema.      Left lower leg: No edema.   Skin:     General: Skin is warm and dry.      Findings: No rash. "   Neurological:      Mental Status: She is alert.   Psychiatric:         Mood and Affect: Mood normal.       Kala Torrez MD

## 2024-03-01 NOTE — ASSESSMENT & PLAN NOTE
Weight has been stable.    Recommended to work on dietary and lifestyle modifications, losing weight.

## 2024-03-01 NOTE — ASSESSMENT & PLAN NOTE
Patient c/o epigastric pain, occasional nausea, abdominal bloating, heartburn for the past 2 months.  Discussed antireflux measures with patient.    Recommended to avoid fried, fatty, acidic foods.  Decrease caffeine consumption.  Avoid late-night meals.    Start Pepcid 20 mg 1 tablet twice daily before meals.    Will order abdominal U/S.    Consider referral to gastroenterology if symptoms persist or worsen.

## 2024-03-04 DIAGNOSIS — F41.9 ANXIETY DISORDER, UNSPECIFIED TYPE: ICD-10-CM

## 2024-03-05 RX ORDER — CLONAZEPAM 2 MG/1
2 TABLET ORAL
Qty: 30 TABLET | Refills: 5 | Status: SHIPPED | OUTPATIENT
Start: 2024-03-05

## 2024-03-08 LAB
ALBUMIN SERPL-MCNC: 4.5 G/DL (ref 3.6–5.1)
ALBUMIN/GLOB SERPL: 1.8 (CALC) (ref 1–2.5)
ALP SERPL-CCNC: 64 U/L (ref 37–153)
ALT SERPL-CCNC: 18 U/L (ref 6–29)
AST SERPL-CCNC: 18 U/L (ref 10–35)
BILIRUB SERPL-MCNC: 0.9 MG/DL (ref 0.2–1.2)
BUN SERPL-MCNC: 19 MG/DL (ref 7–25)
BUN/CREAT SERPL: 18 (CALC) (ref 6–22)
CALCIUM SERPL-MCNC: 9 MG/DL (ref 8.6–10.4)
CHLORIDE SERPL-SCNC: 104 MMOL/L (ref 98–110)
CHOLEST SERPL-MCNC: 195 MG/DL
CHOLEST/HDLC SERPL: 3.7 (CALC)
CO2 SERPL-SCNC: 28 MMOL/L (ref 20–32)
CREAT SERPL-MCNC: 1.08 MG/DL (ref 0.5–1.05)
GFR/BSA.PRED SERPLBLD CYS-BASED-ARV: 56 ML/MIN/1.73M2
GLOBULIN SER CALC-MCNC: 2.5 G/DL (CALC) (ref 1.9–3.7)
GLUCOSE SERPL-MCNC: 107 MG/DL (ref 65–99)
HDLC SERPL-MCNC: 53 MG/DL
LDLC SERPL CALC-MCNC: 113 MG/DL (CALC)
NONHDLC SERPL-MCNC: 142 MG/DL (CALC)
POTASSIUM SERPL-SCNC: 4.1 MMOL/L (ref 3.5–5.3)
PROT SERPL-MCNC: 7 G/DL (ref 6.1–8.1)
SODIUM SERPL-SCNC: 140 MMOL/L (ref 135–146)
TRIGL SERPL-MCNC: 173 MG/DL
TSH SERPL-ACNC: 2.41 MIU/L (ref 0.4–4.5)

## 2024-04-04 DIAGNOSIS — F41.9 ANXIETY DISORDER, UNSPECIFIED TYPE: ICD-10-CM

## 2024-04-05 RX ORDER — CLONAZEPAM 2 MG/1
2 TABLET ORAL
Qty: 30 TABLET | Refills: 5 | Status: SHIPPED | OUTPATIENT
Start: 2024-04-05

## 2024-04-16 ENCOUNTER — OFFICE VISIT (OUTPATIENT)
Dept: FAMILY MEDICINE CLINIC | Facility: CLINIC | Age: 69
End: 2024-04-16
Payer: COMMERCIAL

## 2024-04-16 VITALS
WEIGHT: 186.6 LBS | OXYGEN SATURATION: 96 % | RESPIRATION RATE: 18 BRPM | SYSTOLIC BLOOD PRESSURE: 122 MMHG | TEMPERATURE: 98.5 F | DIASTOLIC BLOOD PRESSURE: 84 MMHG | BODY MASS INDEX: 29.99 KG/M2 | HEART RATE: 78 BPM | HEIGHT: 66 IN

## 2024-04-16 DIAGNOSIS — E03.9 ACQUIRED HYPOTHYROIDISM: ICD-10-CM

## 2024-04-16 DIAGNOSIS — R10.13 DYSPEPSIA: ICD-10-CM

## 2024-04-16 DIAGNOSIS — K21.9 GASTROESOPHAGEAL REFLUX DISEASE, UNSPECIFIED WHETHER ESOPHAGITIS PRESENT: Primary | ICD-10-CM

## 2024-04-16 DIAGNOSIS — R79.89 ELEVATED SERUM CREATININE: ICD-10-CM

## 2024-04-16 DIAGNOSIS — N60.12 FIBROCYSTIC CHANGES OF LEFT BREAST: ICD-10-CM

## 2024-04-16 DIAGNOSIS — R92.8 ABNORMALITY OF RIGHT BREAST ON SCREENING MAMMOGRAM: ICD-10-CM

## 2024-04-16 DIAGNOSIS — E78.2 MIXED HYPERLIPIDEMIA: ICD-10-CM

## 2024-04-16 DIAGNOSIS — E55.9 VITAMIN D DEFICIENCY: ICD-10-CM

## 2024-04-16 DIAGNOSIS — I10 PRIMARY HYPERTENSION: ICD-10-CM

## 2024-04-16 PROCEDURE — G2211 COMPLEX E/M VISIT ADD ON: HCPCS | Performed by: FAMILY MEDICINE

## 2024-04-16 PROCEDURE — 99214 OFFICE O/P EST MOD 30 MIN: CPT | Performed by: FAMILY MEDICINE

## 2024-04-16 NOTE — ASSESSMENT & PLAN NOTE
Stable.  Continue Amlodipine 5 mg daily, Losartan 100 mg daily, Metoprolol ER 25 mg twice daily.  Follow a low sodium diet.  Stay well hydrated.

## 2024-04-16 NOTE — ASSESSMENT & PLAN NOTE
Patient reports some improvement in epigastric pain, heartburn after starting Famotidine 20 mg twice daily before meals.    Recommended to continue current dose.    Avoid caffeine, fried, fatty, acidic foods.      Schedule abdominal  U/S as ordered in February 2024.    Recommended gastroenterology evaluation, discuss scheduling EGD.

## 2024-04-16 NOTE — PROGRESS NOTES
Name: Gayle French      : 1955      MRN: [id number]  Encounter Provider: Kala Torrez MD  Encounter Date: 2024   Encounter department: [facility]    Chief Complaint   Patient presents with    Follow-up     4 week      Health Maintenance   Topic Date Due    Osteoporosis Screening  Never done    Zoster Vaccine (1 of 2) Never done    Breast Cancer Screening: Mammogram  2019    Influenza Vaccine (1) 2023    COVID-19 Vaccine (2 -  season) 2023    Hepatitis C Screening  2025 (Originally 1955)    Medicare Annual Wellness Visit (AWV)  2024    Fall Risk  2025    Depression Screening  2025    Urinary Incontinence Screening  2025    Pneumococcal Vaccine: 65+ Years (3 of 3 - PPSV23 or PCV20) 2027    Colorectal Cancer Screening  2031    HIB Vaccine  Aged Out    IPV Vaccine  Aged Out    Hepatitis A Vaccine  Aged Out    Meningococcal ACWY Vaccine  Aged Out    HPV Vaccine  Aged Out       Assessment & Plan     1. Gastroesophageal reflux disease, unspecified whether esophagitis present  Assessment & Plan:  Patient reports some improvement in epigastric pain, heartburn after starting Famotidine 20 mg twice daily before meals.    Recommended to continue current dose.    Avoid caffeine, fried, fatty, acidic foods.      Schedule abdominal  U/S as ordered in 2024.    Recommended gastroenterology evaluation, discuss scheduling EGD.    Orders:  -     Ambulatory Referral to Gastroenterology; Future    2. Dyspepsia  Assessment & Plan:  Symptoms improved since last visit in 2024.  Continue Famotidine.  Recommended to eat small meals frequently.  Schedule abdominal U/S as ordered in 2024.      Orders:  -     Ambulatory Referral to Gastroenterology; Future    3. Primary hypertension  Assessment & Plan:  Stable.  Continue Amlodipine 5 mg daily, Losartan 100 mg daily, Metoprolol ER 25 mg twice daily.  Follow a low  sodium diet.  Stay well hydrated.    Orders:  -     Basic metabolic panel; Future; Expected date: 04/20/2024  -     Basic metabolic panel    4. Acquired hypothyroidism  Assessment & Plan:  Continue Levothyroxine 88 mcg daily.        5. Mixed hyperlipidemia  Assessment & Plan:  Lipid panel improved.    Continue atorvastatin 40 mg daily.    Follow a low-cholesterol, low-fat diet, regular exercise.      6. Vitamin D deficiency  Assessment & Plan:  Continues taking Vit D 25 OH 50,000 IU one caps. weekly.  Check Vit D 25 OH.      Orders:  -     Vitamin D 25 hydroxy; Future  -     Vitamin D 25 hydroxy    7. Elevated serum creatinine  Assessment & Plan:  Recommended to increase fluid intake, avoid NSAID's.  Check BMP next week.    Orders:  -     Basic metabolic panel; Future; Expected date: 04/20/2024  -     Basic metabolic panel    8. Abnormality of right breast on screening mammogram  Assessment & Plan:  Schedule BL diagnostic mammogram.      Orders:  -     Mammo diagnostic bilateral w cad; Future    9. Fibrocystic changes of left breast  -     Mammo diagnostic bilateral w cad; Future           Schedule follow-up visit, Medicare AWV in 5 months.    Subjective      HPI    Patient presents for 4-week follow-up visit, review blood test results.    Blood work done on March 8, 2024.    Fasting blood sugar 107, creatinine 1.08, GFR 56.  Potassium 4.1.  LFTs - within normal range.  TSH 2.41.    Cholesterol 195, HDL 53, triglycerides 173, .    HTN - BP remains stable.    Denies chest pain, shortness of breath, dizziness.    Patient takes blood pressure medications as prescribed.    Hypothyroidism - on Levothyroxine 88 mcg daily.    GERD/ Dyspepsia - patient reports some improvement in heartburn, epigastric pain after starting on Famotidine 20 mg twice daily before meals.     She had cholecystectomy 20 years ago.    She did not schedule abdominal ultrasound as ordered at the last visit.    Last mammogram done in 8/2018  showed faint 3 mm medial inferior right breast nodular asymmetry.  Radiologist recommended to schedule  follow-up right diagnostic mammogram in 6 months to ensure stability.    Patient did not scheduled follow-up imaging.   She has Medicare insurance now and would like to get a new order for mammogram.    Right breast exam: No palpable masses.  No nipple retraction or discharge.  No axillary lymphadenopathy.    Left breast exam: fibrocystic changes.  No nipple discharge or retraction.  No masses palpated.  No axillary lymphadenopathy.    Review of Systems   Constitutional:  Positive for fatigue (mild). Negative for activity change, appetite change and chills.   HENT:  Negative for congestion and sore throat.    Eyes: Negative.    Respiratory:  Negative for cough and shortness of breath.    Cardiovascular:  Negative for chest pain, palpitations and leg swelling.   Gastrointestinal:  Positive for abdominal pain (mild epigastric pain) and nausea (improved). Negative for constipation and diarrhea.        Reports improvement in heartburn after starting on Famotidine   Genitourinary:  Negative for difficulty urinating and dysuria.   Musculoskeletal:  Positive for arthralgias. Negative for back pain, gait problem and joint swelling.   Neurological:  Negative for dizziness, syncope and headaches.   Hematological: Negative.    Psychiatric/Behavioral:  Negative for dysphoric mood and sleep disturbance. The patient is not nervous/anxious.        Current Outpatient Medications on File Prior to Visit   Medication Sig    amLODIPine (NORVASC) 5 mg tablet Take 1 tablet (5 mg total) by mouth daily    atorvastatin (LIPITOR) 40 mg tablet Take 1 tablet (40 mg total) by mouth daily    citalopram (CeleXA) 40 mg tablet TAKE 1 TABLET BY MOUTH EVERY DAY    clonazePAM (KlonoPIN) 2 mg tablet Take 1 tablet (2 mg total) by mouth daily at bedtime    ergocalciferol (VITAMIN D2) 50,000 units TAKE 1 CAPSULE BY MOUTH ONE TIME PER WEEK    famotidine  "(PEPCID) 20 mg tablet Take 1 tab. twice daily before meals    lamoTRIgine (LaMICtal) 200 MG tablet Take 1 tablet (200 mg total) by mouth daily    levothyroxine 88 mcg tablet TAKE 1 TABLET BY MOUTH EVERY DAY    losartan (COZAAR) 100 MG tablet TAKE 1 TABLET BY MOUTH EVERY DAY    Magnesium Oxide -Mg Supplement 400 MG CAPS TAKE 1 CAPSULE BY MOUTH EVERY DAY    metoprolol succinate (TOPROL-XL) 25 mg 24 hr tablet Take 1 tablet (25 mg total) by mouth 2 (two) times a day       Objective     /84 (BP Location: Left arm, Patient Position: Sitting, Cuff Size: Standard)   Pulse 78   Temp 98.5 °F (36.9 °C) (Tympanic)   Resp 18   Ht 5' 6\" (1.676 m)   Wt 84.6 kg (186 lb 9.6 oz)   SpO2 96%   BMI 30.12 kg/m²     Physical Exam  Vitals and nursing note reviewed.   Constitutional:       Appearance: Normal appearance. She is obese.   HENT:      Head: Normocephalic and atraumatic.   Neck:      Vascular: No carotid bruit.   Cardiovascular:      Rate and Rhythm: Normal rate and regular rhythm.      Heart sounds: No murmur heard.  Pulmonary:      Effort: Pulmonary effort is normal.      Breath sounds: Normal breath sounds.   Abdominal:      General: Bowel sounds are normal.      Palpations: Abdomen is soft.      Tenderness: There is abdominal tenderness (mild tenderness in epigastric vasyl). There is no guarding or rebound.   Musculoskeletal:         General: No swelling. Normal range of motion.      Cervical back: Normal range of motion and neck supple.      Right lower leg: No edema.      Left lower leg: No edema.   Skin:     General: Skin is warm and dry.      Findings: No rash.   Neurological:      Mental Status: She is alert.   Psychiatric:         Mood and Affect: Mood normal.       Kala Torrez MD    "

## 2024-04-16 NOTE — ASSESSMENT & PLAN NOTE
Symptoms improved since last visit in 2/2024.  Continue Famotidine.  Recommended to eat small meals frequently.  Schedule abdominal U/S as ordered in 2/2024.

## 2024-04-16 NOTE — ASSESSMENT & PLAN NOTE
Lipid panel improved.    Continue atorvastatin 40 mg daily.    Follow a low-cholesterol, low-fat diet, regular exercise.

## 2024-04-23 ENCOUNTER — TELEPHONE (OUTPATIENT)
Dept: MAMMOGRAPHY | Facility: CLINIC | Age: 69
End: 2024-04-23

## 2024-05-07 DIAGNOSIS — F41.9 ANXIETY DISORDER, UNSPECIFIED TYPE: ICD-10-CM

## 2024-05-08 RX ORDER — CLONAZEPAM 2 MG/1
2 TABLET ORAL
Qty: 30 TABLET | Refills: 5 | Status: SHIPPED | OUTPATIENT
Start: 2024-05-08

## 2024-05-30 DIAGNOSIS — I10 ESSENTIAL HYPERTENSION: ICD-10-CM

## 2024-05-31 DIAGNOSIS — R10.13 DYSPEPSIA: ICD-10-CM

## 2024-05-31 RX ORDER — METOPROLOL SUCCINATE 25 MG/1
25 TABLET, EXTENDED RELEASE ORAL 2 TIMES DAILY
Qty: 180 TABLET | Refills: 0 | Status: SHIPPED | OUTPATIENT
Start: 2024-05-31

## 2024-06-01 RX ORDER — FAMOTIDINE 20 MG/1
TABLET, FILM COATED ORAL
Qty: 180 TABLET | Refills: 1 | Status: SHIPPED | OUTPATIENT
Start: 2024-06-01

## 2024-06-09 DIAGNOSIS — F41.9 ANXIETY DISORDER, UNSPECIFIED TYPE: ICD-10-CM

## 2024-06-10 ENCOUNTER — OFFICE VISIT (OUTPATIENT)
Dept: URGENT CARE | Age: 69
End: 2024-06-10
Payer: COMMERCIAL

## 2024-06-10 VITALS
TEMPERATURE: 98.8 F | HEIGHT: 67 IN | DIASTOLIC BLOOD PRESSURE: 77 MMHG | OXYGEN SATURATION: 96 % | SYSTOLIC BLOOD PRESSURE: 111 MMHG | WEIGHT: 177.8 LBS | BODY MASS INDEX: 27.91 KG/M2 | HEART RATE: 88 BPM | RESPIRATION RATE: 18 BRPM

## 2024-06-10 DIAGNOSIS — K04.7 DENTAL ABSCESS: Primary | ICD-10-CM

## 2024-06-10 PROCEDURE — 99213 OFFICE O/P EST LOW 20 MIN: CPT

## 2024-06-10 RX ORDER — CHLORHEXIDINE GLUCONATE ORAL RINSE 1.2 MG/ML
15 SOLUTION DENTAL 2 TIMES DAILY
Qty: 120 ML | Refills: 0 | Status: SHIPPED | OUTPATIENT
Start: 2024-06-10

## 2024-06-10 RX ORDER — CLONAZEPAM 2 MG/1
2 TABLET ORAL
Qty: 30 TABLET | Refills: 5 | Status: SHIPPED | OUTPATIENT
Start: 2024-06-10

## 2024-06-10 RX ORDER — AMOXICILLIN 500 MG/1
500 CAPSULE ORAL EVERY 12 HOURS SCHEDULED
Qty: 20 CAPSULE | Refills: 0 | Status: SHIPPED | OUTPATIENT
Start: 2024-06-10 | End: 2024-06-20

## 2024-06-10 NOTE — PROGRESS NOTES
[facility]        NAME: Gayle French is a 68 y.o. female  : 1955    MRN: [id number]  DATE: Martha 10, 2024  TIME: 6:49 PM    Assessment and Plan   Dental abscess [K04.7]  1. Dental abscess  amoxicillin (AMOXIL) 500 mg capsule    chlorhexidine (PERIDEX) 0.12 % solution        Patient presents for eval of left lower jaw pain and swelling. Hx of root canal with issues. Swelling noted to lower jaw line. Discussed dentist f/u     Patient Instructions       Follow up with PCP in 3-5 days.  Proceed to  ER if symptoms worsen.    If tests have been performed at [facility], our office will contact you with results if changes need to be made to the care plan discussed with you at the visit.  [facility]    Chief Complaint     Chief Complaint   Patient presents with    Dental Pain     Patient reports that for the past several years after root canal she has been having dental pain in the left side of her jaw.          History of Present Illness       Patient presents for eval of left lower jaw pain and swelling. Hx of root canal with issues. Swelling noted to lower jaw line. Discussed dentist f/u     Dental Pain         Review of Systems   Review of Systems   HENT:  Positive for dental problem.    All other systems reviewed and are negative.        Current Medications       Current Outpatient Medications:     amoxicillin (AMOXIL) 500 mg capsule, Take 1 capsule (500 mg total) by mouth every 12 (twelve) hours for 10 days, Disp: 20 capsule, Rfl: 0    chlorhexidine (PERIDEX) 0.12 % solution, Apply 15 mL to the mouth or throat 2 (two) times a day, Disp: 120 mL, Rfl: 0    amLODIPine (NORVASC) 5 mg tablet, Take 1 tablet (5 mg total) by mouth daily, Disp: 30 tablet, Rfl: 3    atorvastatin (LIPITOR) 40 mg tablet, Take 1 tablet (40 mg total) by mouth daily, Disp: 30 tablet, Rfl: 5    citalopram (CeleXA) 40 mg tablet, TAKE 1 TABLET BY MOUTH EVERY DAY, Disp: 30 tablet, Rfl:  5    clonazePAM (KlonoPIN) 2 mg tablet, Take 1 tablet (2 mg total) by mouth daily at bedtime, Disp: 30 tablet, Rfl: 5    ergocalciferol (VITAMIN D2) 50,000 units, TAKE 1 CAPSULE BY MOUTH ONE TIME PER WEEK, Disp: 12 capsule, Rfl: 1    famotidine (PEPCID) 20 mg tablet, TAKE 1 TAB. TWICE DAILY BEFORE MEALS, Disp: 180 tablet, Rfl: 1    lamoTRIgine (LaMICtal) 200 MG tablet, Take 1 tablet (200 mg total) by mouth daily, Disp: 30 tablet, Rfl: 5    levothyroxine 88 mcg tablet, TAKE 1 TABLET BY MOUTH EVERY DAY, Disp: 90 tablet, Rfl: 5    losartan (COZAAR) 100 MG tablet, TAKE 1 TABLET BY MOUTH EVERY DAY, Disp: 30 tablet, Rfl: 5    Magnesium Oxide -Mg Supplement 400 MG CAPS, TAKE 1 CAPSULE BY MOUTH EVERY DAY, Disp: 30 capsule, Rfl: 5    metoprolol succinate (TOPROL-XL) 25 mg 24 hr tablet, TAKE 1 TABLET BY MOUTH TWICE A DAY, Disp: 180 tablet, Rfl: 0    Current Allergies     Allergies as of 06/10/2024 - Reviewed 06/10/2024   Allergen Reaction Noted    Bactrim [sulfamethoxazole-trimethoprim] Hives 10/16/2015            The following portions of the patient's history were reviewed and updated as appropriate: allergies, current medications, past family history, past medical history, past social history, past surgical history and problem list.     Past Medical History:   Diagnosis Date    Anxiety     Depression     Disease of thyroid gland     hypo    Dissection of carotid artery (HCC)     Left    Diverticulitis     Herniated lumbar intervertebral disc     Hypertension     Stroke (HCC)     Transient cerebral ischemia     Pt with hx TIA/CVA in 1/2009. Off coumadin per vascular. 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 Last assessed: 06/01/12       Past Surgical History:   Procedure Laterality Date    BACK SURGERY  1992    Lower, for herniated disc.    CHOLECYSTECTOMY      COLONOSCOPY      FRACTURE SURGERY      Open treatment of fracture of one metacarpal bone, right 3rd metacarpal pin.    AR COLONOSCOPY FLX DX W/COLLJ SPEC  "WHEN PFRMD N/A 3/30/2016    Procedure: COLONOSCOPY;  Surgeon: NARCISO Levine MD;  Location: [facility];  Service: Colorectal       Family History   Problem Relation Age of Onset    Diabetes Mother     Depression Father     Coronary artery disease Father     Diabetes Daughter     Cancer Sister          Medications have been verified.        Objective   /77   Pulse 88   Temp 98.8 °F (37.1 °C) (Tympanic)   Resp 18   Ht 5' 7\" (1.702 m)   Wt 80.6 kg (177 lb 12.8 oz)   SpO2 96%   BMI 27.85 kg/m²   No LMP recorded. Patient is postmenopausal.       Physical Exam     Physical Exam  Vitals reviewed.   Constitutional:       Appearance: Normal appearance.   HENT:      Mouth/Throat:      Dentition: Dental abscesses present.   Neurological:      Mental Status: She is alert.                   "

## 2024-06-11 DIAGNOSIS — F41.8 DEPRESSION WITH ANXIETY: ICD-10-CM

## 2024-06-11 DIAGNOSIS — I10 ESSENTIAL HYPERTENSION: ICD-10-CM

## 2024-06-12 RX ORDER — CITALOPRAM 40 MG/1
40 TABLET ORAL DAILY
Qty: 30 TABLET | Refills: 4 | Status: SHIPPED | OUTPATIENT
Start: 2024-06-12

## 2024-06-12 RX ORDER — LAMOTRIGINE 200 MG/1
200 TABLET ORAL DAILY
Qty: 30 TABLET | Refills: 0 | Status: SHIPPED | OUTPATIENT
Start: 2024-06-12

## 2024-06-12 RX ORDER — LOSARTAN POTASSIUM 100 MG/1
100 TABLET ORAL DAILY
Qty: 30 TABLET | Refills: 0 | Status: SHIPPED | OUTPATIENT
Start: 2024-06-12

## 2024-06-28 DIAGNOSIS — E78.2 MIXED HYPERLIPIDEMIA: ICD-10-CM

## 2024-06-28 DIAGNOSIS — I10 PRIMARY HYPERTENSION: Primary | ICD-10-CM

## 2024-06-28 LAB
25(OH)D3 SERPL-MCNC: 44 NG/ML (ref 30–100)
BUN SERPL-MCNC: 14 MG/DL (ref 7–25)
BUN/CREAT SERPL: NORMAL (CALC) (ref 6–22)
CALCIUM SERPL-MCNC: 9 MG/DL (ref 8.6–10.4)
CHLORIDE SERPL-SCNC: 103 MMOL/L (ref 98–110)
CO2 SERPL-SCNC: 30 MMOL/L (ref 20–32)
CREAT SERPL-MCNC: 0.97 MG/DL (ref 0.5–1.05)
GFR/BSA.PRED SERPLBLD CYS-BASED-ARV: 63 ML/MIN/1.73M2
GLUCOSE SERPL-MCNC: 92 MG/DL (ref 65–99)
POTASSIUM SERPL-SCNC: 4.7 MMOL/L (ref 3.5–5.3)
SODIUM SERPL-SCNC: 140 MMOL/L (ref 135–146)

## 2024-07-01 ENCOUNTER — TELEPHONE (OUTPATIENT)
Dept: ADMINISTRATIVE | Facility: OTHER | Age: 69
End: 2024-07-01

## 2024-07-01 NOTE — TELEPHONE ENCOUNTER
07/01/24 1:46 PM    Patient was flagged by a Third Party Payer report as being due for a refill on the following medications, LOSARTAN. Patient was contacted to review these medications. There was no answer and a message was left.     Thank you.  Jennifer Mota MA  [facility]

## 2024-07-02 ENCOUNTER — PATIENT MESSAGE (OUTPATIENT)
Dept: FAMILY MEDICINE CLINIC | Facility: CLINIC | Age: 69
End: 2024-07-02

## 2024-07-02 DIAGNOSIS — I10 ESSENTIAL HYPERTENSION: ICD-10-CM

## 2024-07-02 RX ORDER — LOSARTAN POTASSIUM 100 MG/1
100 TABLET ORAL DAILY
Qty: 90 TABLET | Refills: 3 | Status: SHIPPED | OUTPATIENT
Start: 2024-07-02

## 2024-07-09 DIAGNOSIS — F41.9 ANXIETY DISORDER, UNSPECIFIED TYPE: ICD-10-CM

## 2024-07-10 RX ORDER — CLONAZEPAM 2 MG/1
2 TABLET ORAL
Qty: 30 TABLET | Refills: 5 | Status: SHIPPED | OUTPATIENT
Start: 2024-07-10

## 2024-07-23 DIAGNOSIS — E78.2 MIXED HYPERLIPIDEMIA: ICD-10-CM

## 2024-07-23 RX ORDER — ATORVASTATIN CALCIUM 40 MG/1
40 TABLET, FILM COATED ORAL DAILY
Qty: 90 TABLET | Refills: 3 | Status: SHIPPED | OUTPATIENT
Start: 2024-07-23

## 2024-07-28 DIAGNOSIS — F41.8 DEPRESSION WITH ANXIETY: ICD-10-CM

## 2024-07-29 RX ORDER — LAMOTRIGINE 200 MG/1
200 TABLET ORAL DAILY
Qty: 30 TABLET | Refills: 0 | Status: SHIPPED | OUTPATIENT
Start: 2024-07-29

## 2024-08-07 DIAGNOSIS — F41.9 ANXIETY DISORDER, UNSPECIFIED TYPE: ICD-10-CM

## 2024-08-08 RX ORDER — CLONAZEPAM 2 MG/1
2 TABLET ORAL
Qty: 30 TABLET | Refills: 3 | Status: SHIPPED | OUTPATIENT
Start: 2024-08-08

## 2024-08-27 DIAGNOSIS — F41.8 DEPRESSION WITH ANXIETY: ICD-10-CM

## 2024-08-27 RX ORDER — LAMOTRIGINE 200 MG/1
200 TABLET ORAL DAILY
Qty: 30 TABLET | Refills: 5 | Status: SHIPPED | OUTPATIENT
Start: 2024-08-27

## 2024-09-04 DIAGNOSIS — I10 ESSENTIAL HYPERTENSION: ICD-10-CM

## 2024-09-05 DIAGNOSIS — E55.9 VITAMIN D DEFICIENCY: ICD-10-CM

## 2024-09-05 DIAGNOSIS — I10 ESSENTIAL HYPERTENSION: ICD-10-CM

## 2024-09-05 RX ORDER — AMLODIPINE BESYLATE 5 MG/1
5 TABLET ORAL DAILY
Qty: 30 TABLET | Refills: 3 | Status: SHIPPED | OUTPATIENT
Start: 2024-09-05

## 2024-09-06 RX ORDER — METOPROLOL SUCCINATE 25 MG/1
25 TABLET, EXTENDED RELEASE ORAL 2 TIMES DAILY
Qty: 180 TABLET | Refills: 1 | Status: SHIPPED | OUTPATIENT
Start: 2024-09-06

## 2024-09-06 RX ORDER — ERGOCALCIFEROL 1.25 MG/1
CAPSULE, LIQUID FILLED ORAL
Qty: 12 CAPSULE | Refills: 1 | Status: SHIPPED | OUTPATIENT
Start: 2024-09-06

## 2024-09-09 DIAGNOSIS — R10.13 DYSPEPSIA: ICD-10-CM

## 2024-09-09 RX ORDER — FAMOTIDINE 20 MG/1
TABLET, FILM COATED ORAL
Qty: 180 TABLET | Refills: 1 | Status: SHIPPED | OUTPATIENT
Start: 2024-09-09

## 2024-09-13 DIAGNOSIS — F41.9 ANXIETY DISORDER, UNSPECIFIED TYPE: ICD-10-CM

## 2024-09-13 RX ORDER — CLONAZEPAM 2 MG/1
2 TABLET ORAL
Qty: 30 TABLET | Refills: 5 | Status: SHIPPED | OUTPATIENT
Start: 2024-09-13

## 2024-10-01 DIAGNOSIS — F41.8 DEPRESSION WITH ANXIETY: ICD-10-CM

## 2024-10-01 RX ORDER — LAMOTRIGINE 200 MG/1
200 TABLET ORAL DAILY
Qty: 90 TABLET | Refills: 1 | Status: SHIPPED | OUTPATIENT
Start: 2024-10-01

## 2024-10-01 RX ORDER — CITALOPRAM HYDROBROMIDE 40 MG/1
40 TABLET ORAL DAILY
Qty: 90 TABLET | Refills: 1 | Status: SHIPPED | OUTPATIENT
Start: 2024-10-01

## 2024-10-08 ENCOUNTER — OFFICE VISIT (OUTPATIENT)
Dept: FAMILY MEDICINE CLINIC | Facility: CLINIC | Age: 69
End: 2024-10-08
Payer: COMMERCIAL

## 2024-10-08 VITALS
BODY MASS INDEX: 30.13 KG/M2 | TEMPERATURE: 97.6 F | HEART RATE: 72 BPM | OXYGEN SATURATION: 96 % | RESPIRATION RATE: 16 BRPM | SYSTOLIC BLOOD PRESSURE: 136 MMHG | HEIGHT: 67 IN | DIASTOLIC BLOOD PRESSURE: 78 MMHG | WEIGHT: 192 LBS

## 2024-10-08 DIAGNOSIS — Z23 ENCOUNTER FOR IMMUNIZATION: ICD-10-CM

## 2024-10-08 DIAGNOSIS — E78.2 MIXED HYPERLIPIDEMIA: ICD-10-CM

## 2024-10-08 DIAGNOSIS — E66.811 OBESITY (BMI 30.0-34.9): ICD-10-CM

## 2024-10-08 DIAGNOSIS — Z00.00 MEDICARE ANNUAL WELLNESS VISIT, SUBSEQUENT: Primary | ICD-10-CM

## 2024-10-08 DIAGNOSIS — K21.9 GASTROESOPHAGEAL REFLUX DISEASE, UNSPECIFIED WHETHER ESOPHAGITIS PRESENT: ICD-10-CM

## 2024-10-08 DIAGNOSIS — R92.8 ABNORMALITY OF RIGHT BREAST ON SCREENING MAMMOGRAM: ICD-10-CM

## 2024-10-08 DIAGNOSIS — E03.9 ACQUIRED HYPOTHYROIDISM: ICD-10-CM

## 2024-10-08 DIAGNOSIS — F33.0 MILD EPISODE OF RECURRENT MAJOR DEPRESSIVE DISORDER (HCC): ICD-10-CM

## 2024-10-08 DIAGNOSIS — F41.1 GENERALIZED ANXIETY DISORDER: ICD-10-CM

## 2024-10-08 DIAGNOSIS — Z78.0 POST-MENOPAUSAL: ICD-10-CM

## 2024-10-08 DIAGNOSIS — I10 PRIMARY HYPERTENSION: ICD-10-CM

## 2024-10-08 PROBLEM — K57.92 DIVERTICULITIS: Status: RESOLVED | Noted: 2020-09-28 | Resolved: 2024-10-08

## 2024-10-08 PROCEDURE — 99214 OFFICE O/P EST MOD 30 MIN: CPT | Performed by: NURSE PRACTITIONER

## 2024-10-08 PROCEDURE — G0439 PPPS, SUBSEQ VISIT: HCPCS | Performed by: NURSE PRACTITIONER

## 2024-10-08 PROCEDURE — G0008 ADMIN INFLUENZA VIRUS VAC: HCPCS

## 2024-10-08 PROCEDURE — 90662 IIV NO PRSV INCREASED AG IM: CPT

## 2024-10-08 NOTE — ASSESSMENT & PLAN NOTE
Lab Results   Component Value Date    [id number] 5.65 (H) 01/26/2017    TSH 7.86 (H) 02/02/2019     Patient continues on levothyroxine 88 mcg daily.  TSH ordered.  Orders:    TSH, 3rd generation with Free T4 reflex; Future    TSH, 3rd generation with Free T4 reflex

## 2024-10-08 NOTE — ASSESSMENT & PLAN NOTE
Patient feels well on her Celexa, Lamictal and Klonopin.  She does not see psychiatry or behavioral therapist.

## 2024-10-08 NOTE — PROGRESS NOTES
Ambulatory Visit  Name: Gayle French      : 1955      MRN: [id number]  Encounter Provider: PRISCILLA Smith  Encounter Date: 10/8/2024   Encounter department: [facility]    Assessment & Plan  Medicare annual wellness visit, subsequent         Post-menopausal    Orders:    DXA bone density spine hip and pelvis; Future    Encounter for immunization    Orders:    influenza vaccine, high-dose, PF 0.5 mL (Fluzone High Dose)    Acquired hypothyroidism  Lab Results   Component Value Date    [id number] 5.65 (H) 2017    TSH 7.86 (H) 2019     Patient continues on levothyroxine 88 mcg daily.  TSH ordered.  Orders:    TSH, 3rd generation with Free T4 reflex; Future    TSH, 3rd generation with Free T4 reflex    Primary hypertension  Blood pressure in the office today is 136/78.  The patient is compliant with her Norvasc, Cozaar and metoprolol.  She is eating a lot of prepared frozen meals.  Low-salt diet recommended.         Gastroesophageal reflux disease, unspecified whether esophagitis present  Continue Pepcid.  Watch diet for foods which may trigger reflux.         Generalized anxiety disorder  Patient feels well on her Celexa, Lamictal and Klonopin.  She does not see psychiatry or behavioral therapist.         Mild episode of recurrent major depressive disorder (HCC)  PHQ-2/9 Depression Screening    Little interest or pleasure in doing things: 0 - not at all  Feeling down, depressed, or hopeless: 0 - not at all  Trouble falling or staying asleep, or sleeping too much: 0 - not at all  Feeling tired or having little energy: 1 - several days  Poor appetite or overeatin - several days  Feeling bad about yourself - or that you are a failure or have let yourself or your family down: 1 - several days  Trouble concentrating on things, such as reading the newspaper or watching television: 0 - not at all  Moving or speaking so slowly that other people could have noticed. Or  the opposite - being so fidgety or restless that you have been moving around a lot more than usual: 0 - not at all  Thoughts that you would be better off dead, or of hurting yourself in some way: 0 - not at all  PHQ-9 Score: 3  PHQ-9 Interpretation: No or Minimal depression       Patient with a low PHQ-9 score.  She continues on her Celexa and Lamictal.  She feels well.         Abnormality of right breast on screening mammogram  Reminded patient to call and schedule her 6-month follow-up mammogram which is overdue.         Mixed hyperlipidemia  Patient continues on atorvastatin.  Due for lipid panel.  Low-fat diet recommended.         Obesity (BMI 30.0-34.9)  BMI in the office today is 30.  Lifestyle modifications discussed.           Depression Screening and Follow-up Plan: Patient was screened for depression during today's encounter. They screened negative with a PHQ-9 score of 3.    Urinary Incontinence Plan of Care: counseling topics discussed: practice Kegel (pelvic floor strengthening) exercises, use restroom every 2 hours, limit alcohol, caffeine, spicy foods, and acidic foods and limiting fluid intake 3-4 hours before bed.       Preventive health issues were discussed with patient, and age appropriate screening tests were ordered as noted in patient's After Visit Summary. Personalized health advice and appropriate referrals for health education or preventive services given if needed, as noted in patient's After Visit Summary.    History of Present Illness     Patient presents to the office today for her annual wellness visit and follow-up.  Overall she feels well.  She is not following with any other providers.  She is overdue for blood work and I did remind her to have this done.  She is overdue for a mammogram and ultrasound as well as a DEXA scan and these orders were reprinted.  She feels she is sleeping well.  She does walk 5 times a week.  No problems with bowel and bladder.  Flu shot provided in the  office today.  Discussed COVID-vaccine.       Patient Care Team:  Kala Torrez MD as PCP - General  Kala Torrez MD as PCP - General  Kala Torrez MD as PCP - PCP-[facility]  Kala Torrez MD as PCP - PCP-[facility] ([facility])  NARCISO Levine MD as Endoscopist    Review of Systems   Constitutional:  Negative for activity change, fatigue and fever.   HENT:  Negative for congestion, hearing loss, rhinorrhea, trouble swallowing and voice change.    Eyes:  Negative for photophobia, pain, discharge and visual disturbance.   Respiratory:  Negative for cough, chest tightness and shortness of breath.    Cardiovascular:  Negative for chest pain, palpitations and leg swelling.   Gastrointestinal:  Negative for abdominal pain, blood in stool, constipation, nausea and vomiting.   Endocrine: Negative for cold intolerance and heat intolerance.   Genitourinary:  Negative for difficulty urinating, frequency, hematuria, urgency, vaginal bleeding and vaginal discharge.   Musculoskeletal:  Positive for arthralgias. Negative for myalgias.   Skin: Negative.    Neurological:  Negative for dizziness, weakness, numbness and headaches.   Psychiatric/Behavioral:  Negative for decreased concentration. The patient is not nervous/anxious.      Medical History Reviewed by provider this encounter:       Annual Wellness Visit Questionnaire   Gayle is here for her Subsequent Wellness visit. Last Medicare Wellness visit information reviewed, patient interviewed and updates made to the record today.      Health Risk Assessment:   Patient rates overall health as good. Patient feels that their physical health rating is same. Patient is satisfied with their life. Eyesight was rated as slightly worse. Hearing was rated as same. Patient feels that their emotional and mental health rating is same. Patients states they are sometimes angry. Patient states they are sometimes unusually  tired/fatigued. Pain experienced in the last 7 days has been some. Patient's pain rating has been 5/10. Patient states that she has experienced no weight loss or gain in last 6 months. I have some pain from arthritis & sciatica especially when not sitting correctly or holding my grandson    Depression Screening:   PHQ-9 Score: 3      Fall Risk Screening:   In the past year, patient has experienced: history of falling in past year      Urinary Incontinence Screening:   Patient has leaked urine accidently in the last six months.     Home Safety:  Patient does not have trouble with stairs inside or outside of their home. Patient has working smoke alarms and has working carbon monoxide detector. Home safety hazards include: none.     Nutrition:   Current diet is Unhealthy, Limited junk food and Other (please comment). Do not eat enough fruits & veggies & should be drinking more water although im doing better than last year with water. I often eat frozen entrees that i can cook in the microwave. Im very tired when i come home from [facility]-dont want to cook.    Relies heavily on daughter for meals    Medications:   Patient is not currently taking any over-the-counter supplements. Patient is able to manage medications.     Activities of Daily Living (ADLs)/Instrumental Activities of Daily Living (IADLs):   Walk and transfer into and out of bed and chair?: Yes  Dress and groom yourself?: Yes    Bathe or shower yourself?: Yes    Feed yourself? Yes  Do your laundry/housekeeping?: Yes  Manage your money, pay your bills and track your expenses?: Yes  Make your own meals?: Yes    Do your own shopping?: Yes    Previous Hospitalizations:   Any hospitalizations or ED visits within the last 12 months?: No      Advance Care Planning:   Living will: No    Durable POA for healthcare: No    Advanced directive: No      PREVENTIVE SCREENINGS      Cardiovascular Screening:    General: Screening Not Indicated and History Lipid  Disorder      Diabetes Screening:     General: Screening Current      Colorectal Cancer Screening:     General: Screening Current      Cervical Cancer Screening:    General: Screening Not Indicated      Lung Cancer Screening:     General: Screening Not Indicated      Hepatitis C Screening:    General: Screening Current    Screening, Brief Intervention, and Referral to Treatment (SBIRT)    Screening  Typical number of drinks in a day: 0  Typical number of drinks in a week: 2  Interpretation: Low risk drinking behavior.    AUDIT-C Screenin) How often did you have a drink containing alcohol in the past year? monthly or less  2) How many drinks did you have on a typical day when you were drinking in the past year? 1 to 2  3) How often did you have 6 or more drinks on one occasion in the past year? never    AUDIT-C Score: 1  Interpretation: Score 0-2 (female): Negative screen for alcohol misuse    Single Item Drug Screening:  How often have you used an illegal drug (including marijuana) or a prescription medication for non-medical reasons in the past year? never    Single Item Drug Screen Score: 0  Interpretation: Negative screen for possible drug use disorder    SDOH Risk Assessment  Social determinants of health (SDOH) risk assesment tool was completed. The tool at a minimum covered housing stability, food insecurity, transportation needs, and utility difficulty. Patient had at risk responses for the following SDOH domains: food insecurity.     Social Determinants of Health     Financial Resource Strain: High Risk (2023)    Overall Financial Resource Strain (CARDIA)     Difficulty of Paying Living Expenses: Very hard   Food Insecurity: Food Insecurity Present (10/7/2024)    Hunger Vital Sign     Worried About Running Out of Food in the Last Year: Sometimes true     Ran Out of Food in the Last Year: Sometimes true   Transportation Needs: No Transportation Needs (10/7/2024)    PRAPARE - Transportation     Lack  "of Transportation (Medical): No     Lack of Transportation (Non-Medical): No   Housing Stability: Low Risk  (10/7/2024)    Housing Stability Vital Sign     Unable to Pay for Housing in the Last Year: No     Number of Times Moved in the Last Year: 0     Homeless in the Last Year: No   Utilities: Not At Risk (10/7/2024)    [facility] Utilities     Threatened with loss of utilities: No     No results found.    Objective     Pulse 72   Temp 97.6 °F (36.4 °C) (Temporal)   Resp 16   Ht 5' 7\" (1.702 m)   Wt 87.1 kg (192 lb)   SpO2 96%   BMI 30.07 kg/m²     Physical Exam  Vitals and nursing note reviewed.   Constitutional:       General: She is not in acute distress.     Appearance: Normal appearance. She is obese.   HENT:      Head: Normocephalic and atraumatic.      Right Ear: Tympanic membrane, ear canal and external ear normal. There is no impacted cerumen.      Left Ear: Tympanic membrane, ear canal and external ear normal. There is no impacted cerumen.      Nose: Nose normal.      Mouth/Throat:      Mouth: Mucous membranes are moist.      Pharynx: Oropharynx is clear. No posterior oropharyngeal erythema.   Eyes:      General:         Right eye: No discharge.         Left eye: No discharge.      Extraocular Movements: Extraocular movements intact.      Pupils: Pupils are equal, round, and reactive to light.   Cardiovascular:      Rate and Rhythm: Normal rate and regular rhythm.      Pulses: Normal pulses.      Heart sounds: Normal heart sounds. No murmur heard.  Pulmonary:      Effort: Pulmonary effort is normal.      Breath sounds: Normal breath sounds.   Abdominal:      General: Bowel sounds are normal.      Palpations: Abdomen is soft.   Musculoskeletal:         General: Normal range of motion.      Cervical back: Normal range of motion.   Skin:     General: Skin is warm and dry.      Capillary Refill: Capillary refill takes less than 2 seconds.   Neurological:      General: No focal deficit present.      Mental " Status: She is alert and oriented to person, place, and time. Mental status is at baseline.   Psychiatric:         Mood and Affect: Mood normal.         Behavior: Behavior normal.         Thought Content: Thought content normal.         Judgment: Judgment normal.

## 2024-10-08 NOTE — ASSESSMENT & PLAN NOTE
Blood pressure in the office today is 136/78.  The patient is compliant with her Norvasc, Cozaar and metoprolol.  She is eating a lot of prepared frozen meals.  Low-salt diet recommended.

## 2024-10-08 NOTE — ASSESSMENT & PLAN NOTE
PHQ-2/9 Depression Screening    Little interest or pleasure in doing things: 0 - not at all  Feeling down, depressed, or hopeless: 0 - not at all  Trouble falling or staying asleep, or sleeping too much: 0 - not at all  Feeling tired or having little energy: 1 - several days  Poor appetite or overeatin - several days  Feeling bad about yourself - or that you are a failure or have let yourself or your family down: 1 - several days  Trouble concentrating on things, such as reading the newspaper or watching television: 0 - not at all  Moving or speaking so slowly that other people could have noticed. Or the opposite - being so fidgety or restless that you have been moving around a lot more than usual: 0 - not at all  Thoughts that you would be better off dead, or of hurting yourself in some way: 0 - not at all  PHQ-9 Score: 3  PHQ-9 Interpretation: No or Minimal depression       Patient with a low PHQ-9 score.  She continues on her Celexa and Lamictal.  She feels well.

## 2024-10-08 NOTE — PATIENT INSTRUCTIONS

## 2024-10-23 DIAGNOSIS — F41.9 ANXIETY DISORDER, UNSPECIFIED TYPE: ICD-10-CM

## 2024-10-23 PROBLEM — Z00.00 MEDICARE ANNUAL WELLNESS VISIT, SUBSEQUENT: Status: RESOLVED | Noted: 2020-02-11 | Resolved: 2024-10-23

## 2024-10-23 RX ORDER — CLONAZEPAM 2 MG/1
2 TABLET ORAL
Qty: 30 TABLET | Refills: 0 | Status: SHIPPED | OUTPATIENT
Start: 2024-10-23

## 2024-11-26 DIAGNOSIS — F41.9 ANXIETY DISORDER, UNSPECIFIED TYPE: ICD-10-CM

## 2024-11-26 NOTE — TELEPHONE ENCOUNTER
Reason for call:   [x] Refill   [] Prior Auth  [] Other:     Office:   [x] PCP/Provider - [facility]  [] Specialty/Provider -     Medication: clonazePAM (KlonoPIN) 2 mg tablet     Dose/Frequency: Take 1 tablet (2 mg total) by mouth daily at bedtime     Quantity: 30    Pharmacy:  [facility]/pharmacy #[id number] - [place], [place] - [address]     Does the patient have enough for 3 days?   [] Yes   [x] No - Send as HP to POD

## 2024-11-28 RX ORDER — CLONAZEPAM 2 MG/1
2 TABLET ORAL
Qty: 30 TABLET | Refills: 0 | OUTPATIENT
Start: 2024-11-28

## 2024-12-03 DIAGNOSIS — F41.9 ANXIETY DISORDER, UNSPECIFIED TYPE: ICD-10-CM

## 2024-12-03 NOTE — TELEPHONE ENCOUNTER
This is not a duplicate was last filled on 10/23/24    Reason for call:   [x] Refill   [] Prior Auth  [] Other:     Office:   [x] PCP/Provider -   [] Specialty/Provider -     Medication: Clonazepam 2 mg, take 1 tablet by mouth daily at bedtime       Pharmacy: [facility] [address] [place]     Does the patient have enough for 3 days?   [] Yes   [x] No - Send as HP to POD

## 2024-12-03 NOTE — TELEPHONE ENCOUNTER
Patient called for a status update on this refill. She said she has been calling since last week and it hasn't been sent. She reports that she is completely out of the medication and hasn't been sleeping because of it.      Once medication is sent to the pharmacy, please call patient and let her know.      Thank you

## 2024-12-04 NOTE — TELEPHONE ENCOUNTER
Pt checking again - She's wondering why not being filled.  Please refill send to pharmacy if possible.

## 2024-12-05 RX ORDER — CLONAZEPAM 2 MG/1
2 TABLET ORAL
Qty: 30 TABLET | Refills: 0 | Status: SHIPPED | OUTPATIENT
Start: 2024-12-05

## 2024-12-15 DIAGNOSIS — I10 ESSENTIAL HYPERTENSION: ICD-10-CM

## 2024-12-17 RX ORDER — AMLODIPINE BESYLATE 5 MG/1
5 TABLET ORAL DAILY
Qty: 90 TABLET | Refills: 1 | Status: SHIPPED | OUTPATIENT
Start: 2024-12-17

## 2024-12-30 DIAGNOSIS — F41.9 ANXIETY DISORDER, UNSPECIFIED TYPE: ICD-10-CM

## 2024-12-31 RX ORDER — CLONAZEPAM 2 MG/1
2 TABLET ORAL
Qty: 30 TABLET | Refills: 1 | Status: SHIPPED | OUTPATIENT
Start: 2024-12-31

## 2025-01-22 DIAGNOSIS — E03.9 ACQUIRED HYPOTHYROIDISM: ICD-10-CM

## 2025-01-22 RX ORDER — LEVOTHYROXINE SODIUM 88 UG/1
88 TABLET ORAL DAILY
Qty: 90 TABLET | Refills: 1 | Status: SHIPPED | OUTPATIENT
Start: 2025-01-22

## 2025-02-06 DIAGNOSIS — M79.10 MYALGIA: ICD-10-CM

## 2025-03-12 DIAGNOSIS — F41.9 ANXIETY DISORDER, UNSPECIFIED TYPE: ICD-10-CM

## 2025-03-12 DIAGNOSIS — E55.9 VITAMIN D DEFICIENCY: ICD-10-CM

## 2025-03-12 DIAGNOSIS — E55.9 VITAMIN D DEFICIENCY: Primary | ICD-10-CM

## 2025-03-12 RX ORDER — CLONAZEPAM 2 MG/1
2 TABLET ORAL
Qty: 30 TABLET | Refills: 1 | Status: SHIPPED | OUTPATIENT
Start: 2025-03-12

## 2025-03-12 RX ORDER — ERGOCALCIFEROL 1.25 MG/1
CAPSULE, LIQUID FILLED ORAL
Qty: 12 CAPSULE | Refills: 1 | OUTPATIENT
Start: 2025-03-12

## 2025-03-12 NOTE — TELEPHONE ENCOUNTER
Medication: clonazePAM (KlonoPIN) 2 mg tablet     Dose/Frequency: Take 1 tablet (2 mg total) by mouth daily at bedtime     Quantity: 30 tablet     [facility]  [id number] [place] [facility] [address]     Office:   [x] PCP/Provider -   [] Speciality/Provider -     Does the patient have enough for 3 days?   [] Yes   [x] No - Send as HP to POD

## 2025-03-12 NOTE — TELEPHONE ENCOUNTER
Patient is scheduled on 4/17 for 6 months f/up. There are scripts from Dr. Pruett in patient's chart would they work or she needs different scripts. Please call to advise accordingly. Patient goes to [facility].  Thank you!!

## 2025-03-12 NOTE — TELEPHONE ENCOUNTER
No refill of vitamin D sent. Patient has upcoming appt with Dr. LAST-transfer to any provider schedule. She should have blood work done prior to visit, already ordered.

## 2025-03-20 ENCOUNTER — VBI (OUTPATIENT)
Dept: ADMINISTRATIVE | Facility: OTHER | Age: 70
End: 2025-03-20

## 2025-03-20 NOTE — TELEPHONE ENCOUNTER
03/20/25 11:49 AM     Chart reviewed for Mammogram ; nothing is submitted to the patient's insurance at this time.     Jennifer Mota MA   [facility]

## 2025-04-01 DIAGNOSIS — E55.9 VITAMIN D DEFICIENCY: ICD-10-CM

## 2025-04-01 RX ORDER — ERGOCALCIFEROL 1.25 MG/1
CAPSULE, LIQUID FILLED ORAL
Qty: 12 CAPSULE | Refills: 1 | OUTPATIENT
Start: 2025-04-01

## 2025-04-03 DIAGNOSIS — E55.9 VITAMIN D DEFICIENCY: ICD-10-CM

## 2025-04-03 RX ORDER — ERGOCALCIFEROL 1.25 MG/1
CAPSULE, LIQUID FILLED ORAL
Qty: 12 CAPSULE | Refills: 1 | OUTPATIENT
Start: 2025-04-03

## 2025-04-04 RX ORDER — ERGOCALCIFEROL 1.25 MG/1
CAPSULE, LIQUID FILLED ORAL
Qty: 12 CAPSULE | Refills: 0 | OUTPATIENT
Start: 2025-04-04

## 2025-04-04 NOTE — TELEPHONE ENCOUNTER
Please call patient and remind her to have blood work done before her upcoming appointment with Jannette. She can discuss refill at that appointment.

## 2025-04-12 ENCOUNTER — RA CDI HCC (OUTPATIENT)
Dept: OTHER | Facility: HOSPITAL | Age: 70
End: 2025-04-12

## 2025-04-12 LAB
25(OH)D3 SERPL-MCNC: 49 NG/ML (ref 30–100)
ALBUMIN SERPL-MCNC: 4.2 G/DL (ref 3.6–5.1)
ALBUMIN/GLOB SERPL: 1.6 (CALC) (ref 1–2.5)
ALP SERPL-CCNC: 73 U/L (ref 37–153)
ALT SERPL-CCNC: 18 U/L (ref 6–29)
AST SERPL-CCNC: 16 U/L (ref 10–35)
BASOPHILS # BLD AUTO: 50 CELLS/UL (ref 0–200)
BASOPHILS NFR BLD AUTO: 1 %
BILIRUB SERPL-MCNC: 0.7 MG/DL (ref 0.2–1.2)
BUN SERPL-MCNC: 17 MG/DL (ref 7–25)
BUN/CREAT SERPL: NORMAL (CALC) (ref 6–22)
CALCIUM SERPL-MCNC: 8.9 MG/DL (ref 8.6–10.4)
CHLORIDE SERPL-SCNC: 107 MMOL/L (ref 98–110)
CHOLEST SERPL-MCNC: 206 MG/DL
CHOLEST/HDLC SERPL: 3.8 (CALC)
CO2 SERPL-SCNC: 28 MMOL/L (ref 20–32)
CREAT SERPL-MCNC: 0.98 MG/DL (ref 0.6–1)
EOSINOPHIL # BLD AUTO: 80 CELLS/UL (ref 15–500)
EOSINOPHIL NFR BLD AUTO: 1.6 %
ERYTHROCYTE [DISTWIDTH] IN BLOOD BY AUTOMATED COUNT: 13 % (ref 11–15)
GFR/BSA.PRED SERPLBLD CYS-BASED-ARV: 62 ML/MIN/1.73M2
GLOBULIN SER CALC-MCNC: 2.6 G/DL (CALC) (ref 1.9–3.7)
GLUCOSE SERPL-MCNC: 89 MG/DL (ref 65–99)
HCT VFR BLD AUTO: 39.2 % (ref 35–45)
HDLC SERPL-MCNC: 54 MG/DL
HGB BLD-MCNC: 12.8 G/DL (ref 11.7–15.5)
LDLC SERPL CALC-MCNC: 122 MG/DL (CALC)
LYMPHOCYTES # BLD AUTO: 1980 CELLS/UL (ref 850–3900)
LYMPHOCYTES NFR BLD AUTO: 39.6 %
MCH RBC QN AUTO: 29.2 PG (ref 27–33)
MCHC RBC AUTO-ENTMCNC: 32.7 G/DL (ref 32–36)
MCV RBC AUTO: 89.5 FL (ref 80–100)
MONOCYTES # BLD AUTO: 280 CELLS/UL (ref 200–950)
MONOCYTES NFR BLD AUTO: 5.6 %
NEUTROPHILS # BLD AUTO: 2610 CELLS/UL (ref 1500–7800)
NEUTROPHILS NFR BLD AUTO: 52.2 %
NONHDLC SERPL-MCNC: 152 MG/DL (CALC)
PLATELET # BLD AUTO: 253 THOUSAND/UL (ref 140–400)
PMV BLD REES-ECKER: 10.3 FL (ref 7.5–12.5)
POTASSIUM SERPL-SCNC: 4.1 MMOL/L (ref 3.5–5.3)
PROT SERPL-MCNC: 6.8 G/DL (ref 6.1–8.1)
RBC # BLD AUTO: 4.38 MILLION/UL (ref 3.8–5.1)
SODIUM SERPL-SCNC: 141 MMOL/L (ref 135–146)
TRIGL SERPL-MCNC: 181 MG/DL
TSH SERPL-ACNC: 3.52 MIU/L (ref 0.4–4.5)
WBC # BLD AUTO: 5 THOUSAND/UL (ref 3.8–10.8)

## 2025-04-15 DIAGNOSIS — F41.9 ANXIETY DISORDER, UNSPECIFIED TYPE: ICD-10-CM

## 2025-04-17 ENCOUNTER — OFFICE VISIT (OUTPATIENT)
Dept: FAMILY MEDICINE CLINIC | Facility: CLINIC | Age: 70
End: 2025-04-17
Payer: COMMERCIAL

## 2025-04-17 VITALS
HEIGHT: 67 IN | HEART RATE: 64 BPM | BODY MASS INDEX: 30.67 KG/M2 | DIASTOLIC BLOOD PRESSURE: 80 MMHG | TEMPERATURE: 98.9 F | WEIGHT: 195.4 LBS | SYSTOLIC BLOOD PRESSURE: 138 MMHG | OXYGEN SATURATION: 96 % | RESPIRATION RATE: 18 BRPM

## 2025-04-17 DIAGNOSIS — E03.9 ACQUIRED HYPOTHYROIDISM: ICD-10-CM

## 2025-04-17 DIAGNOSIS — E66.811 OBESITY (BMI 30.0-34.9): ICD-10-CM

## 2025-04-17 DIAGNOSIS — E78.2 MIXED HYPERLIPIDEMIA: ICD-10-CM

## 2025-04-17 DIAGNOSIS — Z78.0 POST-MENOPAUSAL: ICD-10-CM

## 2025-04-17 DIAGNOSIS — R73.01 IFG (IMPAIRED FASTING GLUCOSE): ICD-10-CM

## 2025-04-17 DIAGNOSIS — R19.00 ABDOMINAL WALL BULGE: ICD-10-CM

## 2025-04-17 DIAGNOSIS — K21.9 GASTROESOPHAGEAL REFLUX DISEASE, UNSPECIFIED WHETHER ESOPHAGITIS PRESENT: Primary | ICD-10-CM

## 2025-04-17 DIAGNOSIS — Z12.31 ENCOUNTER FOR SCREENING MAMMOGRAM FOR MALIGNANT NEOPLASM OF BREAST: ICD-10-CM

## 2025-04-17 DIAGNOSIS — I10 PRIMARY HYPERTENSION: ICD-10-CM

## 2025-04-17 DIAGNOSIS — F33.0 MILD EPISODE OF RECURRENT MAJOR DEPRESSIVE DISORDER (HCC): ICD-10-CM

## 2025-04-17 PROCEDURE — 99214 OFFICE O/P EST MOD 30 MIN: CPT | Performed by: NURSE PRACTITIONER

## 2025-04-17 RX ORDER — CLONAZEPAM 2 MG/1
2 TABLET ORAL
Qty: 30 TABLET | Refills: 0 | Status: SHIPPED | OUTPATIENT
Start: 2025-04-17

## 2025-04-17 NOTE — PROGRESS NOTES
Name: Gayle French      : 1955      MRN: [id number]  Encounter Provider: PRISCILLA Schaffer  Encounter Date: 2025   Encounter department: [facility]    Assessment & Plan  Primary hypertension  Stable, continue Norvasc, Cozaar and Metoprolol  Advised to follow a low sodium diet    Orders:    CBC and differential; Future    Mixed hyperlipidemia  Stable, continue statin therapy     Orders:    Lipid panel; Future    Mild episode of recurrent major depressive disorder (HCC)  Stable at this time  Continue Lamictal and Celexa daily   Has Klonopin 2 mg for prn use at HS         Acquired hypothyroidism  TSH 3.52  Continue Levothyroxine 88 mcg daily     Orders:    TSH, 3rd generation with Free T4 reflex; Future    Obesity (BMI 30.0-34.9)  Encouraged lifestyle modifications          IFG (impaired fasting glucose)  A1C ordered  Follow a low carb/ low sugar diet     Orders:    Comprehensive metabolic panel; Future    Hemoglobin A1C; Future    Gastroesophageal reflux disease, unspecified whether esophagitis present  Stable, may continue Pepcid  Avoid dietary triggers         Post-menopausal    Abdominal wall bulge  US of abdominal wall ordered for further evaluation  Avoid heavy lifting, pushing, strenuous activity   Encounter for screening mammogram for malignant neoplasm of breast      Depression Screening and Follow-up Plan: Patient was screened for depression during today's encounter. They screened negative with a PHQ-9 score of 3.          History of Present Illness     HPI  Pt presents by herself today for a routine follow up.  She was previously following with Dr. Torrez in our office who recently retired.    Today, she notes a bulge in her upper mid abdomen that is more noticeable when sitting.  Not necessarily painful but noticeable and uncomfortable at times.  No injuries to site.  Reflux has been stable with Pepcid 20 mg daily.  No appetite changes     Lab work from  4/11/25:  Vitamin D 49  TSH 3.52--on Levothyroxine 88 mcg daily   / HDL 54/ / -- ASCVD risk at 14.7%-- on Atorvastatin   FBS 89  Bun 17, Cr 0.98     Last mammogram done in 8/2018 showed faint 3 mm medial inferior right breast nodular asymmetry.  Radiologist recommended to schedule  follow-up right diagnostic mammogram in 6 months to ensure stability.  Pt states she would like to have imaging completed at this time         Review of Systems   Constitutional:  Negative for activity change, appetite change, chills, diaphoresis, fatigue, fever and unexpected weight change.   Eyes:  Negative for visual disturbance.   Respiratory:  Negative for cough, chest tightness, shortness of breath and wheezing.    Cardiovascular:  Negative for chest pain, palpitations and leg swelling.   Gastrointestinal:  Negative for abdominal pain, blood in stool, constipation, diarrhea, nausea and rectal pain.        As noted in HPI   Genitourinary:  Negative for dysuria.   Musculoskeletal:  Positive for arthralgias. Negative for myalgias.   Skin:  Negative for rash and wound.   Neurological:  Negative for dizziness, weakness, numbness and headaches.   Psychiatric/Behavioral:  Negative for dysphoric mood, self-injury, sleep disturbance and suicidal ideas. The patient is not nervous/anxious.      Past Medical History:   Diagnosis Date    Anxiety     Depression     Disease of thyroid gland     hypo    Dissection of carotid artery (HCC)     Left    Diverticulitis     Herniated lumbar intervertebral disc     Hypertension     Stroke (HCC)     Transient cerebral ischemia     Pt with hx TIA/CVA in 1/2009. Off coumadin per vascular. 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 Last assessed: 06/01/12     Past Surgical History:   Procedure Laterality Date    BACK SURGERY  1992    Lower, for herniated disc.    CHOLECYSTECTOMY      COLONOSCOPY      FRACTURE SURGERY      Open treatment of fracture of one metacarpal bone, right 3rd  "metacarpal pin.    MD COLONOSCOPY FLX DX W/COLLJ SPEC WHEN PFRMD N/A 3/30/2016    Procedure: COLONOSCOPY;  Surgeon: NARCISO Levine MD;  Location: [facility];  Service: Colorectal     Family History   Problem Relation Age of Onset    Diabetes Mother     Depression Father     Coronary artery disease Father     Diabetes Daughter     Cancer Sister      Social History     Tobacco Use    Smoking status: Never     Passive exposure: Never    Smokeless tobacco: Never   Vaping Use    Vaping status: Never Used   Substance and Sexual Activity    Alcohol use: Yes     Comment: Social    Drug use: Never    Sexual activity: Not Currently     Current Outpatient Medications on File Prior to Visit   Medication Sig    amLODIPine (NORVASC) 5 mg tablet TAKE 1 TABLET (5 MG TOTAL) BY MOUTH DAILY.    atorvastatin (LIPITOR) 40 mg tablet TAKE 1 TABLET BY MOUTH EVERY DAY    citalopram (CeleXA) 40 mg tablet TAKE 1 TABLET BY MOUTH EVERY DAY    clonazePAM (KlonoPIN) 2 mg tablet Take 1 tablet (2 mg total) by mouth daily at bedtime    ergocalciferol (VITAMIN D2) 50,000 units TAKE 1 CAPSULE BY MOUTH ONE TIME PER WEEK    famotidine (PEPCID) 20 mg tablet TAKE 1 TABLET BY MOUTH TWICE A DAY BEFORE MEALS    lamoTRIgine (LaMICtal) 200 MG tablet TAKE 1 TABLET BY MOUTH EVERY DAY    levothyroxine 88 mcg tablet TAKE 1 TABLET BY MOUTH EVERY DAY    losartan (COZAAR) 100 MG tablet Take 1 tablet (100 mg total) by mouth daily    Magnesium Oxide -Mg Supplement 400 MG CAPS TAKE 1 CAPSULE BY MOUTH EVERY DAY    metoprolol succinate (TOPROL-XL) 25 mg 24 hr tablet TAKE 1 TABLET BY MOUTH TWICE A DAY     Allergies   Allergen Reactions    Bactrim [Sulfamethoxazole-Trimethoprim] Hives     Other reaction(s): \"red spots\"     Immunization History   Administered Date(s) Administered    COVID-19 J&J (Excep Apps) vaccine 0.5 mL 08/14/2021    INFLUENZA 10/29/2017, 10/10/2018    Influenza Quadrivalent 3 years and older 10/20/2017    Influenza Split High Dose Preservative Free IM " "10/08/2024    Influenza, high dose seasonal 0.7 mL 09/27/2022    Influenza, recombinant, quadrivalent,injectable, preservative free 10/27/2019    Influenza, seasonal, injectable 09/10/2015    Pneumococcal Conjugate 13-Valent 02/09/2022    Pneumococcal Polysaccharide PPV23 03/08/2016    Td (adult), adsorbed 01/01/1996, 06/02/2013    Tdap 06/02/2013, 08/29/2023    Tuberculin Skin Test-PPD Intradermal 03/13/2018, 02/11/2020, 02/09/2022    influenza, trivalent, adjuvanted 09/21/2020     Objective   /80   Pulse 64   Temp 98.9 °F (37.2 °C) (Tympanic)   Resp 18   Ht 5' 7\" (1.702 m)   Wt 88.6 kg (195 lb 6.4 oz)   SpO2 96%   BMI 30.60 kg/m²     Physical Exam  Constitutional:       General: She is not in acute distress.     Appearance: She is well-developed. She is obese. She is not ill-appearing, toxic-appearing or diaphoretic.   HENT:      Head: Normocephalic and atraumatic.   Eyes:      Extraocular Movements: Extraocular movements intact.      Conjunctiva/sclera: Conjunctivae normal.      Pupils: Pupils are equal, round, and reactive to light.   Neck:      Thyroid: No thyromegaly.   Cardiovascular:      Rate and Rhythm: Normal rate and regular rhythm.      Heart sounds: No murmur heard.  Pulmonary:      Effort: Pulmonary effort is normal. No respiratory distress.      Breath sounds: Normal breath sounds. No wheezing.   Abdominal:      General: Bowel sounds are normal. There is no distension.      Palpations: Abdomen is soft.      Tenderness: There is no abdominal tenderness.       Musculoskeletal:         General: Normal range of motion.      Cervical back: Normal range of motion and neck supple. No rigidity or tenderness.      Right lower leg: No edema.      Left lower leg: No edema.   Lymphadenopathy:      Cervical: No cervical adenopathy.   Skin:     General: Skin is warm and dry.   Neurological:      General: No focal deficit present.      Mental Status: She is alert and oriented to person, place, and " time.   Psychiatric:         Mood and Affect: Mood normal.         Behavior: Behavior normal.         Thought Content: Thought content normal.         Judgment: Judgment normal.

## 2025-04-18 PROBLEM — R73.01 IFG (IMPAIRED FASTING GLUCOSE): Status: ACTIVE | Noted: 2025-04-18

## 2025-04-18 NOTE — ASSESSMENT & PLAN NOTE
Stable at this time  Continue Lamictal and Celexa daily   Has Klonopin 2 mg for prn use at HS

## 2025-04-18 NOTE — ASSESSMENT & PLAN NOTE
A1C ordered  Follow a low carb/ low sugar diet     Orders:    Comprehensive metabolic panel; Future    Hemoglobin A1C; Future

## 2025-04-18 NOTE — ASSESSMENT & PLAN NOTE
TSH 3.52  Continue Levothyroxine 88 mcg daily     Orders:    TSH, 3rd generation with Free T4 reflex; Future

## 2025-04-18 NOTE — ASSESSMENT & PLAN NOTE
Stable, continue Norvasc, Cozaar and Metoprolol  Advised to follow a low sodium diet    Orders:    CBC and differential; Future

## 2025-04-22 ENCOUNTER — HOSPITAL ENCOUNTER (OUTPATIENT)
Dept: RADIOLOGY | Age: 70
Discharge: HOME | End: 2025-04-22
Attending: NURSE PRACTITIONER
Payer: COMMERCIAL

## 2025-04-22 DIAGNOSIS — R19.00 ABDOMINAL WALL BULGE: ICD-10-CM

## 2025-04-22 PROCEDURE — 76705 ECHO EXAM OF ABDOMEN: CPT

## 2025-04-28 DIAGNOSIS — E55.9 VITAMIN D DEFICIENCY: ICD-10-CM

## 2025-04-30 ENCOUNTER — RESULTS FOLLOW-UP (OUTPATIENT)
Dept: FAMILY MEDICINE CLINIC | Facility: CLINIC | Age: 70
End: 2025-04-30

## 2025-04-30 ENCOUNTER — TELEPHONE (OUTPATIENT)
Dept: FAMILY MEDICINE CLINIC | Facility: CLINIC | Age: 70
End: 2025-04-30

## 2025-04-30 DIAGNOSIS — I10 ESSENTIAL HYPERTENSION: ICD-10-CM

## 2025-04-30 DIAGNOSIS — F41.8 DEPRESSION WITH ANXIETY: ICD-10-CM

## 2025-04-30 DIAGNOSIS — E78.2 MIXED HYPERLIPIDEMIA: ICD-10-CM

## 2025-04-30 DIAGNOSIS — K43.9 SUPRAUMBILICAL HERNIA: Primary | ICD-10-CM

## 2025-04-30 RX ORDER — LAMOTRIGINE 200 MG/1
200 TABLET ORAL DAILY
Qty: 90 TABLET | Refills: 1 | Status: SHIPPED | OUTPATIENT
Start: 2025-04-30

## 2025-04-30 RX ORDER — ERGOCALCIFEROL 1.25 MG/1
CAPSULE, LIQUID FILLED ORAL
Qty: 12 CAPSULE | Refills: 0 | OUTPATIENT
Start: 2025-04-30

## 2025-04-30 RX ORDER — METOPROLOL SUCCINATE 25 MG/1
25 TABLET, EXTENDED RELEASE ORAL 2 TIMES DAILY
Qty: 180 TABLET | Refills: 1 | Status: SHIPPED | OUTPATIENT
Start: 2025-04-30

## 2025-04-30 RX ORDER — AMLODIPINE BESYLATE 5 MG/1
5 TABLET ORAL DAILY
Qty: 90 TABLET | Refills: 1 | Status: SHIPPED | OUTPATIENT
Start: 2025-04-30

## 2025-04-30 RX ORDER — CITALOPRAM HYDROBROMIDE 40 MG/1
40 TABLET ORAL DAILY
Qty: 90 TABLET | Refills: 1 | Status: SHIPPED | OUTPATIENT
Start: 2025-04-30

## 2025-04-30 RX ORDER — ATORVASTATIN CALCIUM 40 MG/1
40 TABLET, FILM COATED ORAL DAILY
Qty: 90 TABLET | Refills: 3 | Status: SHIPPED | OUTPATIENT
Start: 2025-04-30

## 2025-04-30 NOTE — TELEPHONE ENCOUNTER
Patient called the RX Refill Line. Message is being forwarded to the office.     Patient requesting call to discuss results of Abdominal US and options for next steps.

## 2025-04-30 NOTE — TELEPHONE ENCOUNTER
Please let pt know the US showed a hernia.  Referral to General Surgery placed.  She should try to avoid any heavy pushing/pulling or straining

## 2025-05-01 DIAGNOSIS — E55.9 VITAMIN D DEFICIENCY: ICD-10-CM

## 2025-05-01 DIAGNOSIS — I10 ESSENTIAL HYPERTENSION: ICD-10-CM

## 2025-05-01 DIAGNOSIS — E03.9 ACQUIRED HYPOTHYROIDISM: ICD-10-CM

## 2025-05-01 RX ORDER — ERGOCALCIFEROL 1.25 MG/1
CAPSULE, LIQUID FILLED ORAL
Qty: 12 CAPSULE | Refills: 1 | OUTPATIENT
Start: 2025-05-01

## 2025-05-01 RX ORDER — LOSARTAN POTASSIUM 100 MG/1
100 TABLET ORAL DAILY
Qty: 90 TABLET | Refills: 3 | Status: SHIPPED | OUTPATIENT
Start: 2025-05-01

## 2025-05-01 NOTE — TELEPHONE ENCOUNTER
Spoke to patient aware of results and PCP referral to general Surgery and recommendations.      Patient stating that medications have not gone to pharmacy for her to pick. Will call pharmacy to confirm.

## 2025-05-02 RX ORDER — LEVOTHYROXINE SODIUM 88 UG/1
88 TABLET ORAL DAILY
Qty: 90 TABLET | Refills: 1 | Status: SHIPPED | OUTPATIENT
Start: 2025-05-02

## 2025-05-18 DIAGNOSIS — F41.9 ANXIETY DISORDER, UNSPECIFIED TYPE: ICD-10-CM

## 2025-05-19 RX ORDER — CLONAZEPAM 2 MG/1
2 TABLET ORAL
Qty: 30 TABLET | Refills: 0 | Status: SHIPPED | OUTPATIENT
Start: 2025-05-19

## 2025-05-20 ENCOUNTER — CONSULT (OUTPATIENT)
Dept: SURGERY | Facility: CLINIC | Age: 70
End: 2025-05-20
Payer: COMMERCIAL

## 2025-05-20 VITALS
WEIGHT: 194 LBS | OXYGEN SATURATION: 97 % | HEIGHT: 67 IN | DIASTOLIC BLOOD PRESSURE: 78 MMHG | SYSTOLIC BLOOD PRESSURE: 122 MMHG | BODY MASS INDEX: 30.45 KG/M2 | HEART RATE: 78 BPM

## 2025-05-20 DIAGNOSIS — K43.9 VENTRAL HERNIA WITHOUT OBSTRUCTION OR GANGRENE: Primary | ICD-10-CM

## 2025-05-20 PROCEDURE — 99203 OFFICE O/P NEW LOW 30 MIN: CPT | Performed by: SURGERY

## 2025-05-20 NOTE — PROGRESS NOTES
"Name: Gayle French      : 1955      MRN: [id number]  Encounter Provider: Cody Hannah MD  Encounter Date: 2025   Encounter department: [facility] GENERAL SURGERY [facility]  :  Assessment & Plan  Ventral hernia without obstruction or gangrene             History of Present Illness   HPI  Gayle French is a 69 y.o. female who presents for ventral hernia consult.  States she has had a bulge above her umbilicus for 6 months.  She has intermittent dull pain.  Does not limit her activities.  Ultrasound abdominal wall 2025 was reviewed.  FINDINGS:  Hernial defect measuring 1.3 cm with herniation of fat seen.  IMPRESSION:  Fat-containing supraumbilical hernia.       Examination reveals a ventral hernia above the umbilicus.  This is soft.  It is not reducible.  Operative repair is recommended.  Risks and benefits explained.    The chart was reviewed for this consultation as well as imaging.  Medications were reviewed.  Preoperative medication management was provided.  Anticipate postoperative pain management medication      Review of Systems   Constitutional: Negative.  Negative for activity change.   HENT: Negative.     Eyes: Negative.    Respiratory: Negative.     Cardiovascular: Negative.    Gastrointestinal:  Positive for abdominal pain.   Endocrine: Negative.    Genitourinary: Negative.    Musculoskeletal: Negative.    Skin: Negative.    Allergic/Immunologic: Negative.    Neurological: Negative.    Psychiatric/Behavioral:  Negative for agitation, behavioral problems and confusion. The patient is not nervous/anxious.           Objective   /78   Pulse 78   Ht 5' 6.5\" (1.689 m)   Wt 88 kg (194 lb)   SpO2 97%   BMI 30.84 kg/m²      Physical Exam  Vitals and nursing note reviewed.   Constitutional:       General: She is not in acute distress.     Appearance: She is well-developed.   HENT:      Head: Normocephalic and atraumatic.     Cardiovascular:      Rate and Rhythm: " Normal rate and regular rhythm.   Pulmonary:      Effort: Pulmonary effort is normal.      Breath sounds: Normal breath sounds.   Abdominal:      Palpations: Abdomen is soft.      Tenderness: There is no abdominal tenderness.      Hernia: A hernia is present.     Musculoskeletal:         General: No swelling.      Cervical back: Neck supple.     Skin:     General: Skin is warm and dry.      Capillary Refill: Capillary refill takes less than 2 seconds.     Neurological:      Mental Status: She is alert.     Psychiatric:         Mood and Affect: Mood normal.

## 2025-05-28 ENCOUNTER — PREP FOR PROCEDURE (OUTPATIENT)
Dept: SURGERY | Facility: CLINIC | Age: 70
End: 2025-05-28

## 2025-05-28 DIAGNOSIS — K43.9 VENTRAL HERNIA: Primary | ICD-10-CM

## 2025-06-12 ENCOUNTER — APPOINTMENT (OUTPATIENT)
Dept: LAB | Facility: AMBULARY SURGERY CENTER | Age: 70
End: 2025-06-12
Payer: COMMERCIAL

## 2025-06-12 ENCOUNTER — OFFICE VISIT (OUTPATIENT)
Dept: LAB | Facility: AMBULARY SURGERY CENTER | Age: 70
End: 2025-06-12
Payer: COMMERCIAL

## 2025-06-12 DIAGNOSIS — E55.9 VITAMIN D DEFICIENCY: ICD-10-CM

## 2025-06-12 DIAGNOSIS — K43.9 VENTRAL HERNIA WITHOUT OBSTRUCTION OR GANGRENE: Primary | ICD-10-CM

## 2025-06-12 DIAGNOSIS — K43.9 VENTRAL HERNIA: ICD-10-CM

## 2025-06-12 LAB
ALBUMIN SERPL BCG-MCNC: 4.4 G/DL (ref 3.5–5)
ALP SERPL-CCNC: 76 U/L (ref 34–104)
ALT SERPL W P-5'-P-CCNC: 15 U/L (ref 7–52)
ANION GAP SERPL CALCULATED.3IONS-SCNC: 9 MMOL/L (ref 4–13)
AST SERPL W P-5'-P-CCNC: 14 U/L (ref 13–39)
BILIRUB SERPL-MCNC: 1.08 MG/DL (ref 0.2–1)
BUN SERPL-MCNC: 12 MG/DL (ref 5–25)
CALCIUM SERPL-MCNC: 8.8 MG/DL (ref 8.4–10.2)
CHLORIDE SERPL-SCNC: 102 MMOL/L (ref 96–108)
CO2 SERPL-SCNC: 28 MMOL/L (ref 21–32)
CREAT SERPL-MCNC: 0.97 MG/DL (ref 0.6–1.3)
ERYTHROCYTE [DISTWIDTH] IN BLOOD BY AUTOMATED COUNT: 14 % (ref 11.6–15.1)
GFR SERPL CREATININE-BSD FRML MDRD: 59 ML/MIN/1.73SQ M
GLUCOSE SERPL-MCNC: 82 MG/DL (ref 65–140)
HCT VFR BLD AUTO: 39.9 % (ref 34.8–46.1)
HGB BLD-MCNC: 12.5 G/DL (ref 11.5–15.4)
MCH RBC QN AUTO: 29.1 PG (ref 26.8–34.3)
MCHC RBC AUTO-ENTMCNC: 31.3 G/DL (ref 31.4–37.4)
MCV RBC AUTO: 93 FL (ref 82–98)
PLATELET # BLD AUTO: 193 THOUSANDS/UL (ref 149–390)
PMV BLD AUTO: 10.9 FL (ref 8.9–12.7)
POTASSIUM SERPL-SCNC: 4.2 MMOL/L (ref 3.5–5.3)
PROT SERPL-MCNC: 7.2 G/DL (ref 6.4–8.4)
RBC # BLD AUTO: 4.29 MILLION/UL (ref 3.81–5.12)
SODIUM SERPL-SCNC: 139 MMOL/L (ref 135–147)
WBC # BLD AUTO: 5.32 THOUSAND/UL (ref 4.31–10.16)

## 2025-06-12 PROCEDURE — 85027 COMPLETE CBC AUTOMATED: CPT

## 2025-06-12 PROCEDURE — 36415 COLL VENOUS BLD VENIPUNCTURE: CPT

## 2025-06-12 PROCEDURE — 93005 ELECTROCARDIOGRAM TRACING: CPT

## 2025-06-12 PROCEDURE — 80053 COMPREHEN METABOLIC PANEL: CPT

## 2025-06-13 ENCOUNTER — ANESTHESIA EVENT (OUTPATIENT)
Dept: PERIOP | Facility: AMBULARY SURGERY CENTER | Age: 70
End: 2025-06-13
Payer: COMMERCIAL

## 2025-06-14 LAB
ATRIAL RATE: 56 BPM
P AXIS: 22 DEGREES
PR INTERVAL: 194 MS
QRS AXIS: 12 DEGREES
QRSD INTERVAL: 90 MS
QT INTERVAL: 444 MS
QTC INTERVAL: 429 MS
T WAVE AXIS: 62 DEGREES
VENTRICULAR RATE: 56 BPM

## 2025-06-14 PROCEDURE — 93010 ELECTROCARDIOGRAM REPORT: CPT | Performed by: INTERNAL MEDICINE

## 2025-06-16 RX ORDER — ERGOCALCIFEROL 1.25 MG/1
CAPSULE, LIQUID FILLED ORAL
Qty: 12 CAPSULE | Refills: 0 | OUTPATIENT
Start: 2025-06-16

## 2025-06-24 DIAGNOSIS — F41.9 ANXIETY DISORDER, UNSPECIFIED TYPE: ICD-10-CM

## 2025-06-24 RX ORDER — OXYCODONE AND ACETAMINOPHEN 5; 325 MG/1; MG/1
1 TABLET ORAL EVERY 4 HOURS PRN
Qty: 10 TABLET | Refills: 0 | Status: SHIPPED | OUTPATIENT
Start: 2025-06-24

## 2025-06-24 RX ORDER — CLONAZEPAM 2 MG/1
2 TABLET ORAL
Qty: 30 TABLET | Refills: 0 | Status: SHIPPED | OUTPATIENT
Start: 2025-06-24

## 2025-06-26 NOTE — PRE-PROCEDURE INSTRUCTIONS
Pre-Surgery Instructions:   Medication Instructions    amLODIPine (NORVASC) 5 mg tablet Take day of surgery.    atorvastatin (LIPITOR) 40 mg tablet Take night before surgery    citalopram (CeleXA) 40 mg tablet Take day of surgery.    clonazePAM (KlonoPIN) 2 mg tablet Take night before surgery    famotidine (PEPCID) 20 mg tablet Take day of surgery.    lamoTRIgine (LaMICtal) 200 MG tablet Take day of surgery.    levothyroxine 88 mcg tablet Take day of surgery.    losartan (COZAAR) 100 MG tablet Hold day of surgery.    metoprolol succinate (TOPROL-XL) 25 mg 24 hr tablet Take day of surgery.    oxyCODONE-acetaminophen (PERCOCET) 5-325 mg per tablet Instructions provided by MD  Post surgery   Medication instructions for day of surgery reviewed. Please take all instructed medications with only a sip of water. Please do not take any over the counter (non-prescribed) vitamins or supplements for one week prior to date of surgery.      You will receive a call one business day prior to surgery with an arrival time and hospital directions. If your surgery is scheduled on a Monday, the hospital will be calling you on the Friday prior to your surgery. If you have not heard from anyone by 8pm, please call the hospital supervisor through the hospital  at [phone]. ([place] [phone] or [place] [phone]).    Do not eat or drink anything after midnight the night before your surgery, including candy, mints, lifesavers, or chewing gum. Do not drink alcohol 24hrs before your surgery. Try not to smoke at least 24hrs before your surgery.       Follow the pre surgery showering instructions as listed in the “My Surgical Experience Booklet” or otherwise provided by your surgeon's office. Do not use a blade to shave the surgical area 1 week before surgery. It is okay to use a clean electric clippers up to 24 hours before surgery. Do not apply any lotions, creams, including makeup, cologne, deodorant, or perfumes after  showering on the day of your surgery. Do not use dry shampoo, hair spray, hair gel, or any type of hair products.     No contact lenses, eye make-up, or artificial eyelashes. Remove nail polish, including gel polish, and any artificial, gel, or acrylic nails if possible. Remove all jewelry including rings and body piercing jewelry.     Wear causal clothing that is easy to take on and off. Consider your type of surgery.    Keep any valuables, jewelry, piercings at home. Please bring any specially ordered equipment (sling, braces) if indicated.    Arrange for a responsible person to drive you to and from the hospital on the day of your surgery. Please confirm the visitor policy for the day of your procedure when you receive your phone call with an arrival time.     Call the surgeon's office with any new illnesses, exposures, or additional questions prior to surgery.    Please reference your “My Surgical Experience Booklet” for additional information to prepare for your upcoming surgery.

## 2025-06-27 ENCOUNTER — HOSPITAL ENCOUNTER (OUTPATIENT)
Facility: AMBULARY SURGERY CENTER | Age: 70
Setting detail: OUTPATIENT SURGERY
Discharge: HOME | End: 2025-06-27
Attending: SURGERY | Admitting: SURGERY
Payer: COMMERCIAL

## 2025-06-27 ENCOUNTER — ANESTHESIA (OUTPATIENT)
Dept: PERIOP | Facility: AMBULARY SURGERY CENTER | Age: 70
End: 2025-06-27
Payer: COMMERCIAL

## 2025-06-27 VITALS
RESPIRATION RATE: 18 BRPM | BODY MASS INDEX: 30.13 KG/M2 | HEIGHT: 67 IN | DIASTOLIC BLOOD PRESSURE: 75 MMHG | WEIGHT: 192 LBS | TEMPERATURE: 97.8 F | SYSTOLIC BLOOD PRESSURE: 134 MMHG | OXYGEN SATURATION: 96 % | HEART RATE: 70 BPM

## 2025-06-27 DIAGNOSIS — M25.50 ARTHRALGIA OF MULTIPLE SITES: Primary | ICD-10-CM

## 2025-06-27 PROCEDURE — NC001 PR NO CHARGE: Performed by: SURGERY

## 2025-06-27 PROCEDURE — C1781 MESH (IMPLANTABLE): HCPCS | Performed by: SURGERY

## 2025-06-27 PROCEDURE — 49593 RPR AA HRN 1ST 3-10 RDC: CPT | Performed by: SURGERY

## 2025-06-27 PROCEDURE — 49593 RPR AA HRN 1ST 3-10 RDC: CPT | Performed by: PHYSICIAN ASSISTANT

## 2025-06-27 DEVICE — VENTRIO ST HERNIA PATCH, 8.0 CM X 12.0 CM (3.1" X 4.7"), OVAL
Type: IMPLANTABLE DEVICE | Site: ABDOMEN | Status: FUNCTIONAL
Brand: VENTRIO

## 2025-06-27 RX ORDER — DEXAMETHASONE SODIUM PHOSPHATE 10 MG/ML
INJECTION, SOLUTION INTRAMUSCULAR; INTRAVENOUS AS NEEDED
Status: DISCONTINUED | OUTPATIENT
Start: 2025-06-27 | End: 2025-06-27

## 2025-06-27 RX ORDER — ACETAMINOPHEN 325 MG/1
650 TABLET ORAL EVERY 4 HOURS PRN
Status: DISCONTINUED | OUTPATIENT
Start: 2025-06-27 | End: 2025-06-27 | Stop reason: HOSPADM

## 2025-06-27 RX ORDER — FENTANYL CITRATE 50 UG/ML
INJECTION, SOLUTION INTRAMUSCULAR; INTRAVENOUS AS NEEDED
Status: DISCONTINUED | OUTPATIENT
Start: 2025-06-27 | End: 2025-06-27

## 2025-06-27 RX ORDER — HYDROMORPHONE HCL/PF 1 MG/ML
SYRINGE (ML) INJECTION AS NEEDED
Status: DISCONTINUED | OUTPATIENT
Start: 2025-06-27 | End: 2025-06-27

## 2025-06-27 RX ORDER — DIPHENHYDRAMINE HYDROCHLORIDE 50 MG/ML
12.5 INJECTION, SOLUTION INTRAMUSCULAR; INTRAVENOUS ONCE AS NEEDED
Status: DISCONTINUED | OUTPATIENT
Start: 2025-06-27 | End: 2025-06-27 | Stop reason: HOSPADM

## 2025-06-27 RX ORDER — MAGNESIUM HYDROXIDE 1200 MG/15ML
LIQUID ORAL AS NEEDED
Status: DISCONTINUED | OUTPATIENT
Start: 2025-06-27 | End: 2025-06-27 | Stop reason: HOSPADM

## 2025-06-27 RX ORDER — HYDROMORPHONE HCL/PF 1 MG/ML
0.5 SYRINGE (ML) INJECTION
Status: DISCONTINUED | OUTPATIENT
Start: 2025-06-27 | End: 2025-06-27 | Stop reason: HOSPADM

## 2025-06-27 RX ORDER — SODIUM CHLORIDE, SODIUM LACTATE, POTASSIUM CHLORIDE, CALCIUM CHLORIDE 600; 310; 30; 20 MG/100ML; MG/100ML; MG/100ML; MG/100ML
125 INJECTION, SOLUTION INTRAVENOUS CONTINUOUS
Status: DISCONTINUED | OUTPATIENT
Start: 2025-06-27 | End: 2025-06-27 | Stop reason: HOSPADM

## 2025-06-27 RX ORDER — FENTANYL CITRATE/PF 50 MCG/ML
50 SYRINGE (ML) INJECTION
Status: COMPLETED | OUTPATIENT
Start: 2025-06-27 | End: 2025-06-27

## 2025-06-27 RX ORDER — CEFAZOLIN SODIUM 2 G/50ML
2000 SOLUTION INTRAVENOUS ONCE
Status: COMPLETED | OUTPATIENT
Start: 2025-06-27 | End: 2025-06-27

## 2025-06-27 RX ORDER — HYDROCODONE BITARTRATE AND ACETAMINOPHEN 5; 325 MG/1; MG/1
1 TABLET ORAL EVERY 6 HOURS PRN
Refills: 0 | Status: DISCONTINUED | OUTPATIENT
Start: 2025-06-27 | End: 2025-06-27 | Stop reason: HOSPADM

## 2025-06-27 RX ORDER — ONDANSETRON 2 MG/ML
INJECTION INTRAMUSCULAR; INTRAVENOUS AS NEEDED
Status: DISCONTINUED | OUTPATIENT
Start: 2025-06-27 | End: 2025-06-27

## 2025-06-27 RX ORDER — MIDAZOLAM HYDROCHLORIDE 2 MG/2ML
INJECTION, SOLUTION INTRAMUSCULAR; INTRAVENOUS AS NEEDED
Status: DISCONTINUED | OUTPATIENT
Start: 2025-06-27 | End: 2025-06-27

## 2025-06-27 RX ORDER — LABETALOL HYDROCHLORIDE 5 MG/ML
INJECTION, SOLUTION INTRAVENOUS AS NEEDED
Status: DISCONTINUED | OUTPATIENT
Start: 2025-06-27 | End: 2025-06-27

## 2025-06-27 RX ORDER — PROPOFOL 10 MG/ML
INJECTION, EMULSION INTRAVENOUS AS NEEDED
Status: DISCONTINUED | OUTPATIENT
Start: 2025-06-27 | End: 2025-06-27

## 2025-06-27 RX ORDER — ONDANSETRON 2 MG/ML
4 INJECTION INTRAMUSCULAR; INTRAVENOUS ONCE AS NEEDED
Status: DISCONTINUED | OUTPATIENT
Start: 2025-06-27 | End: 2025-06-27 | Stop reason: HOSPADM

## 2025-06-27 RX ORDER — ALBUTEROL SULFATE 90 UG/1
INHALANT RESPIRATORY (INHALATION) AS NEEDED
Status: DISCONTINUED | OUTPATIENT
Start: 2025-06-27 | End: 2025-06-27

## 2025-06-27 RX ORDER — ONDANSETRON 2 MG/ML
4 INJECTION INTRAMUSCULAR; INTRAVENOUS EVERY 4 HOURS PRN
Status: DISCONTINUED | OUTPATIENT
Start: 2025-06-27 | End: 2025-06-27 | Stop reason: HOSPADM

## 2025-06-27 RX ORDER — SODIUM CHLORIDE, SODIUM LACTATE, POTASSIUM CHLORIDE, CALCIUM CHLORIDE 600; 310; 30; 20 MG/100ML; MG/100ML; MG/100ML; MG/100ML
INJECTION, SOLUTION INTRAVENOUS CONTINUOUS PRN
Status: DISCONTINUED | OUTPATIENT
Start: 2025-06-27 | End: 2025-06-27

## 2025-06-27 RX ORDER — ALBUTEROL SULFATE 0.83 MG/ML
2.5 SOLUTION RESPIRATORY (INHALATION) ONCE AS NEEDED
Status: DISCONTINUED | OUTPATIENT
Start: 2025-06-27 | End: 2025-06-27 | Stop reason: HOSPADM

## 2025-06-27 RX ORDER — BUPIVACAINE HYDROCHLORIDE AND EPINEPHRINE 2.5; 5 MG/ML; UG/ML
INJECTION, SOLUTION EPIDURAL; INFILTRATION; INTRACAUDAL; PERINEURAL AS NEEDED
Status: DISCONTINUED | OUTPATIENT
Start: 2025-06-27 | End: 2025-06-27 | Stop reason: HOSPADM

## 2025-06-27 RX ORDER — ROCURONIUM BROMIDE 10 MG/ML
INJECTION, SOLUTION INTRAVENOUS AS NEEDED
Status: DISCONTINUED | OUTPATIENT
Start: 2025-06-27 | End: 2025-06-27

## 2025-06-27 RX ORDER — LIDOCAINE HYDROCHLORIDE 10 MG/ML
INJECTION, SOLUTION EPIDURAL; INFILTRATION; INTRACAUDAL; PERINEURAL AS NEEDED
Status: DISCONTINUED | OUTPATIENT
Start: 2025-06-27 | End: 2025-06-27

## 2025-06-27 RX ORDER — HYDROMORPHONE HCL/PF 1 MG/ML
0.5 SYRINGE (ML) INJECTION
Refills: 0 | Status: DISCONTINUED | OUTPATIENT
Start: 2025-06-27 | End: 2025-06-27 | Stop reason: HOSPADM

## 2025-06-27 RX ADMIN — CEFAZOLIN SODIUM 2000 MG: 2 SOLUTION INTRAVENOUS at 07:39

## 2025-06-27 RX ADMIN — DEXMEDETOMIDINE 12 MCG: 100 INJECTION, SOLUTION INTRAVENOUS at 08:14

## 2025-06-27 RX ADMIN — LABETALOL HYDROCHLORIDE 10 MG: 5 INJECTION, SOLUTION INTRAVENOUS at 08:13

## 2025-06-27 RX ADMIN — PROPOFOL 50 MCG/KG/MIN: 10 INJECTION, EMULSION INTRAVENOUS at 07:45

## 2025-06-27 RX ADMIN — LABETALOL HYDROCHLORIDE 10 MG: 5 INJECTION, SOLUTION INTRAVENOUS at 08:06

## 2025-06-27 RX ADMIN — MIDAZOLAM HYDROCHLORIDE 1 MG: 1 INJECTION, SOLUTION INTRAMUSCULAR; INTRAVENOUS at 07:27

## 2025-06-27 RX ADMIN — LIDOCAINE HYDROCHLORIDE 50 MG: 10 INJECTION, SOLUTION EPIDURAL; INFILTRATION; INTRACAUDAL at 07:37

## 2025-06-27 RX ADMIN — SUGAMMADEX 300 MG: 100 INJECTION, SOLUTION INTRAVENOUS at 08:19

## 2025-06-27 RX ADMIN — ROCURONIUM 50 MG: 50 INJECTION, SOLUTION INTRAVENOUS at 07:37

## 2025-06-27 RX ADMIN — DEXMEDETOMIDINE 8 MCG: 100 INJECTION, SOLUTION INTRAVENOUS at 07:56

## 2025-06-27 RX ADMIN — SODIUM CHLORIDE, SODIUM LACTATE, POTASSIUM CHLORIDE, AND CALCIUM CHLORIDE: .6; .31; .03; .02 INJECTION, SOLUTION INTRAVENOUS at 07:25

## 2025-06-27 RX ADMIN — ONDANSETRON 4 MG: 2 INJECTION INTRAMUSCULAR; INTRAVENOUS at 08:12

## 2025-06-27 RX ADMIN — FENTANYL CITRATE 100 MCG: 50 INJECTION INTRAMUSCULAR; INTRAVENOUS at 07:37

## 2025-06-27 RX ADMIN — FENTANYL CITRATE 50 MCG: 50 INJECTION INTRAMUSCULAR; INTRAVENOUS at 09:12

## 2025-06-27 RX ADMIN — PROPOFOL 150 MG: 10 INJECTION, EMULSION INTRAVENOUS at 07:37

## 2025-06-27 RX ADMIN — ALBUTEROL SULFATE 8 PUFF: 90 AEROSOL, METERED RESPIRATORY (INHALATION) at 08:32

## 2025-06-27 RX ADMIN — HYDROCODONE BITARTRATE AND ACETAMINOPHEN 1 TABLET: 5; 325 TABLET ORAL at 10:24

## 2025-06-27 RX ADMIN — HYDROMORPHONE HYDROCHLORIDE 0.25 MG: 1 INJECTION, SOLUTION INTRAMUSCULAR; INTRAVENOUS; SUBCUTANEOUS at 07:56

## 2025-06-27 RX ADMIN — FENTANYL CITRATE 50 MCG: 50 INJECTION INTRAMUSCULAR; INTRAVENOUS at 09:06

## 2025-06-27 RX ADMIN — DEXAMETHASONE SODIUM PHOSPHATE 10 MG: 10 INJECTION INTRAMUSCULAR; INTRAVENOUS at 07:45

## 2025-06-27 NOTE — H&P
"General Surgical Care   Gayle Frnech 69 y.o. female MRN: [id number]  Unit/Bed#: OR [place] Encounter: [id number]          ASSESSMENT: Ventral hernia    PLAN: Ventral hernia repair      Review of Systems  Constitutional:  Denies fever or chills   Eyes:  Denies change in visual acuity   HENT:  Denies nasal congestion or sore throat   Respiratory:  Denies cough or shortness of breath   Cardiovascular:  Denies chest pain or edema   GI:  Denies abdominal pain, nausea, vomiting, bloody stools or diarrhea   Musculoskeletal:  Denies back pain or joint pain   Integument:  Denies rash   Neurologic:  Denies headache, focal weakness or sensory changes   Endocrine:  Denies polyuria or polydipsia   Lymphatic:  Denies swollen glands   Psychiatric:  Denies depression or anxiety     Historical Information   Past Medical History[1]  Past Surgical History[2]  Social History   Social History     Substance and Sexual Activity   Alcohol Use Yes    Comment: 4drinks 2 or 3 times a month     Social History     Substance and Sexual Activity   Drug Use Never     Tobacco Use History[3]  Family History[4]    Meds/Allergies       Medications Prior to Admission:     amLODIPine (NORVASC) 5 mg tablet    atorvastatin (LIPITOR) 40 mg tablet    citalopram (CeleXA) 40 mg tablet    clonazePAM (KlonoPIN) 2 mg tablet    famotidine (PEPCID) 20 mg tablet    lamoTRIgine (LaMICtal) 200 MG tablet    levothyroxine 88 mcg tablet    losartan (COZAAR) 100 MG tablet    metoprolol succinate (TOPROL-XL) 25 mg 24 hr tablet    ergocalciferol (VITAMIN D2) 50,000 units    Current Facility-Administered Medications:     ceFAZolin (ANCEF) IVPB (premix in dextrose) 2,000 mg 50 mL, Once    Allergies[5]    Objective     Blood pressure 143/82, pulse 66, temperature 98.2 °F (36.8 °C), temperature source Temporal, resp. rate 18, height 5' 7\" (1.702 m), weight 87.1 kg (192 lb), SpO2 97%.    No intake or output data in the 24 hours ending 06/27/25 0711    PHYSICAL EXAM  General " appearance: alert and oriented, in no acute distress  Lungs: clear to auscultation bilaterally  Heart: regular rate and rhythm, S1, S2 normal, no murmur, click, rub or gallop  Abdomen: soft, non-tender; bowel sounds normal; no masses,  no organomegaly  Skin: Skin color, texture, turgor normal. No rashes or lesions    Lab Results:   No visits with results within 1 Day(s) from this visit.   Latest known visit with results is:   Office Visit on 06/12/2025   Component Date Value    Ventricular Rate 06/12/2025 56     Atrial Rate 06/12/2025 56     DC Interval 06/12/2025 194     QRSD Interval 06/12/2025 90     QT Interval 06/12/2025 444     QTC Interval 06/12/2025 429     P Axis 06/12/2025 22     QRS Axis 06/12/2025 12     T Wave Axis 06/12/2025 62      Imaging Studies:   No results found.     Counseling / Coordination of Care  Total time spent today  15 minutes. Greater than 50% of total time was spent with the patient and / or family counseling and / or coordination of care.            [1]   Past Medical History:  Diagnosis Date    Anxiety     Arthritis     Depression     Disease of thyroid gland     hypo    Dissection of carotid artery (HCC)     Left    Diverticulitis     Diverticulitis of colon     Herniated lumbar intervertebral disc     Hyperlipidemia     Hypertension     Stroke (HCC)     Transient cerebral ischemia     Pt with hx TIA/CVA in 1/2009. Off coumadin per vascular. 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 Last assessed: 06/01/12    Visual impairment    [2]   Past Surgical History:  Procedure Laterality Date    BACK SURGERY  1992    Lower, for herniated disc.    CHOLECYSTECTOMY      COLONOSCOPY      FRACTURE SURGERY      Open treatment of fracture of one metacarpal bone, right 3rd metacarpal pin.    DC COLONOSCOPY FLX DX W/COLLJ SPEC WHEN PFRMD N/A 3/30/2016    Procedure: COLONOSCOPY;  Surgeon: NARCISO Levine MD;  Location: [facility];  Service: Colorectal   [3]   Social History  Tobacco Use  "  Smoking Status Never    Passive exposure: Never   Smokeless Tobacco Never   [4]   Family History  Problem Relation Name Age of Onset    Diabetes Mother Miryam         Type 2    Coronary artery disease Mother Miryam     Depression Father Vaughn     Coronary artery disease Father Vaughn     Schizophrenia Father Vaughn     Diabetes Daughter Brielle         Type 1    Cancer Sister Ricardo    [5]   Allergies  Allergen Reactions    Bactrim [Sulfamethoxazole-Trimethoprim] Hives     Other reaction(s): \"red spots\"     "

## 2025-06-27 NOTE — ANESTHESIA PREPROCEDURE EVALUATION
Procedure:  REPAIR HERNIA VENTRAL (Abdomen)    Relevant Problems   CARDIO   (+) Carotid artery stenosis   (+) HTN (hypertension)   (+) Mixed hyperlipidemia      ENDO   (+) Hypothyroidism      GI/HEPATIC   (+) GERD (gastroesophageal reflux disease)      NEURO/PSYCH   (+) Generalized anxiety disorder   (+) Mild episode of recurrent major depressive disorder (HCC)        Physical Exam    Airway     Mallampati score: II  TM Distance: >3 FB  Neck ROM: full  Mouth opening: >= 4 cm      Cardiovascular  Rhythm: regular, Rate: normalNo peripheral edema    Dental   No notable dental hx     Pulmonary   No stridor    Neurological    She appears awake, alert and oriented x3.      Other Findings  post-pubertal.      Anesthesia Plan  ASA Score- 2     Anesthesia Type- general with ASA Monitors.         Additional Monitors:     Airway Plan: Oral ETT.           Plan Factors-Exercise tolerance (METS): >4 METS.    Chart reviewed.   Existing labs reviewed. Patient summary reviewed.                  Induction- intravenous.    Postoperative Plan- Plan for postoperative opioid use.   Monitoring Plan - Monitoring plan - standard ASA monitoring  Post Operative Pain Plan - non-opiod analgesics and plan for postoperative opioid use        Informed Consent- Anesthetic plan and risks discussed with patient.  I personally reviewed this patient with the CRNA. Discussed and agreed on the Anesthesia Plan with the CRNA..      NPO Status:  Vitals Value Taken Time   Date of last liquid 06/26/25 06/27/25 06:46   Time of last liquid 2100 06/27/25 06:46   Date of last solid 06/26/25 06/27/25 06:46   Time of last solid 2100 06/27/25 06:46

## 2025-06-27 NOTE — OP NOTE
OPERATIVE REPORT  PATIENT NAME: Gayle French    :  1955  MRN: [id number]  Pt Location: [facility] OR ROOM 01    SURGERY DATE: 2025    Surgeons and Role:     * Cody Hannah MD - Primary     * Mary Holloway PA-C - Assisting I was present for the entire procedure. A qualified resident physician was not available, and a physician assistant was necessary to provide expert assistance in the form of providing optimal exposure with retraction, suturing, and assistance with dissection in order to perform the most efficient operation and in order to optimize patient safety in the absence of a qualified surgical resident.      Preop Diagnosis:  Ventral hernia [K43.9]    Post-Op Diagnosis Codes:     * Ventral hernia [K43.9]    Procedure(s):  REPAIR HERNIA VENTRAL    Specimen(s):  * No specimens in log *    Estimated Blood Loss:   Minimal    Drains:  * No LDAs found *    Anesthesia Type:   General    Operative Indications:  Ventral hernia [K43.9]      Operative Findings:    Prior to opening the fascia, or reducing the contents of the ventral hernia, the hernia defect was measured. The defect measured 2.5 cm by 2.1 cm.  The umbilical hernia measures 1.2 x 1.1 cm in size.  This was repaired as described in the body of the report below.       Complications:   None    Procedure and Technique:  Patient was notified visually by armband.  Placed in supine position.  After anesthesia the abdomen was prepped and draped in sterile fashion.  Quarter percent Marcaine with epinephrine was utilized throughout the procedure.    Incision was made and carried out through skin subtenons tissue above the umbilicus.  Hernia in the ventral component was dissected free.  A preperitoneal plane was created.  Finger dissection revealed an umbilical hernia to be present.    A composite mesh was then inserted covering both herniations.  This is pexied in 4 quadrants to keep the mesh in the anterior abdominal wall.  The  fascia to was then closed with interrupted figure-of-eight #1 Vicryl suture.  As well as 2-0 Vicryl subcutaneous and 4-0 Monocryl sutures.  Dermabond was applied.  Patient tolerated this procedure well.  Sponge instrument count correct x 2.   I was present for the entire procedure.    Patient Disposition:  PACU          SIGNATURE: Cody Hannah MD  DATE: June 27, 2025  TIME: 8:40 AM

## 2025-06-27 NOTE — DISCHARGE INSTR - AVS FIRST PAGE
Please call the office when you leave to schedule an appointment for 2 weeks.              Please call [phone]. [address] [facility], [address], [place], [address]. [address] between [place] [address] and [address].     Activity:    May lift 10 lb as many times as desired the 1st week,       20 lb in 2 weeks,       30 lb in 3 weeks.                Walking is encouraged  Normal daily activities including climbing steps are okay  Do not engage in strenuous activity ( sit-ups or crutches) or contact sports for 4-6 weeks post-operatively    Return to Work:   Okay to return to work when you feel well if you desire.        Diet:   You may return to your normal healthy diet.    Wound Care:  Your wound is closed with dissolvable stitches and glue.  It is okay to shower. Wash incision gently with soap and water and pat dry. Do not soak incisions in bath water or swim for two weeks. Do not apply any creams or ointments.    Pain Medication:   Please take as directed if needed. May use Advil or Motrin in addition.  Recall, the pain medicine and anesthesia is associated with constipation.    No driving while taking narcotic pain medications.      Other:  It is normal to developed a “healing ridge” / firm incision after surgery.  This is your body making scar tissue.  It is a good sign  Constipation is very common after general anesthesia.  Please use milk of magnesia as needed in order to help prevent constipation.  It is normal to get bruising after surgery.  If you have questions after discharge please call the office.    If you have increased pain, fever >101.5, increased drainage, redness or a bad smell at your surgery site, please call us immediately or come directly to the Emergency Room

## 2025-06-27 NOTE — ANESTHESIA POSTPROCEDURE EVALUATION
Post-Op Assessment Note    CV Status:  Stable  Pain Score: 1    Pain management: adequate       Mental Status:  Alert and awake   Hydration Status:  Euvolemic   PONV Controlled:  Controlled   Airway Patency:  Patent     Post Op Vitals Reviewed: Yes    No anethesia notable event occurred.    Staff: Anesthesiologist           Last Filed PACU Vitals:  Vitals Value Taken Time   Temp 97.9 °F (36.6 °C) 06/27/25 08:45   Pulse 71 06/27/25 09:30   /70 06/27/25 09:30   Resp 16 06/27/25 09:30   SpO2 93 % 06/27/25 09:30       Modified Shwetha:     Vitals Value Taken Time   Activity 1 06/27/25 09:30   Respiration 1 06/27/25 09:30   Circulation 1 06/27/25 09:30   Consciousness 1 06/27/25 09:30   Oxygen Saturation 1 06/27/25 09:30     Modified Shwetha Score: 5

## 2025-06-27 NOTE — ANESTHESIA POSTPROCEDURE EVALUATION
Post-Op Assessment Note    CV Status:  Stable  Pain Score: 0    Pain management: adequate       Mental Status:  Awake and somnolent   Hydration Status:  Stable   PONV Controlled:  None   Airway Patency:  Patent     Post Op Vitals Reviewed: Yes    No anethesia notable event occurred.    Staff: CRNA, Anesthesiologist   Comments: Patient in PACU, airway patent, VSS. No c/o pain or nasuea.        Last Filed PACU Vitals:  Vitals Value Taken Time   Temp 97.9    Pulse 75 06/27/25 08:46   /73 06/27/25 08:45   Resp 0 06/27/25 08:46   SpO2 95 % 06/27/25 08:46   Vitals shown include unfiled device data.

## 2025-06-30 ENCOUNTER — VBI (OUTPATIENT)
Dept: ADMINISTRATIVE | Facility: OTHER | Age: 70
End: 2025-06-30

## 2025-06-30 NOTE — TELEPHONE ENCOUNTER
06/30/25 8:48 AM     Chart reviewed for Mammogram ; nothing is submitted to the patient's insurance at this time.     Jennifer Mota MA   [facility]

## 2025-07-09 NOTE — TELEPHONE ENCOUNTER
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
I called patient and made her aware the order was placed 
English

## 2025-07-15 ENCOUNTER — OFFICE VISIT (OUTPATIENT)
Dept: SURGERY | Facility: CLINIC | Age: 70
End: 2025-07-15

## 2025-07-15 ENCOUNTER — TELEPHONE (OUTPATIENT)
Dept: SURGERY | Facility: CLINIC | Age: 70
End: 2025-07-15

## 2025-07-15 DIAGNOSIS — K43.9 VENTRAL HERNIA: Primary | ICD-10-CM

## 2025-07-15 PROCEDURE — 99024 POSTOP FOLLOW-UP VISIT: CPT | Performed by: SURGERY

## 2025-07-27 DIAGNOSIS — F41.9 ANXIETY DISORDER, UNSPECIFIED TYPE: ICD-10-CM

## 2025-07-30 RX ORDER — CLONAZEPAM 2 MG/1
2 TABLET ORAL
Qty: 30 TABLET | Refills: 0 | Status: SHIPPED | OUTPATIENT
Start: 2025-07-30

## (undated) DEVICE — Device

## (undated) DEVICE — BETHLEHEM UNIVERSAL MINOR GEN: Brand: CARDINAL HEALTH

## (undated) DEVICE — DECANTER: Brand: UNBRANDED

## (undated) DEVICE — EXOFIN PRECISION PEN HIGH VISCOSITY TOPICAL SKIN ADHESIVE: Brand: EXOFIN PRECISION PEN, 1G

## (undated) DEVICE — VIOLET BRAIDED (POLYGLACTIN 910), SYNTHETIC ABSORBABLE SUTURE: Brand: COATED VICRYL

## (undated) DEVICE — ANTIBACTERIAL UNDYED BRAIDED (POLYGLACTIN 910), SYNTHETIC ABSORBABLE SUTURE: Brand: COATED VICRYL

## (undated) DEVICE — ANTIBACTERIAL UNDYED BRAIDED (POLYGLACTIN 910), SYNTHETIC ABSORBABLE SURGICAL SUTURE: Brand: COATED VICRYL

## (undated) DEVICE — INSULATED BLADE ELECTRODE: Brand: EDGE

## (undated) DEVICE — SUT VICRYL PLUS 2-0 54IN VCP286G